# Patient Record
Sex: MALE | Race: WHITE
[De-identification: names, ages, dates, MRNs, and addresses within clinical notes are randomized per-mention and may not be internally consistent; named-entity substitution may affect disease eponyms.]

---

## 2017-06-19 ENCOUNTER — HOSPITAL ENCOUNTER (OUTPATIENT)
Dept: HOSPITAL 82 - ED | Age: 57
Setting detail: OBSERVATION
Discharge: HOME | End: 2017-06-19
Attending: INTERNAL MEDICINE | Admitting: INTERNAL MEDICINE
Payer: MEDICARE

## 2017-06-19 VITALS — HEIGHT: 71 IN | BODY MASS INDEX: 30.86 KG/M2 | WEIGHT: 220.44 LBS

## 2017-06-19 VITALS — DIASTOLIC BLOOD PRESSURE: 79 MMHG | SYSTOLIC BLOOD PRESSURE: 141 MMHG

## 2017-06-19 VITALS — DIASTOLIC BLOOD PRESSURE: 92 MMHG | SYSTOLIC BLOOD PRESSURE: 145 MMHG

## 2017-06-19 DIAGNOSIS — E78.5: ICD-10-CM

## 2017-06-19 DIAGNOSIS — I11.0: ICD-10-CM

## 2017-06-19 DIAGNOSIS — E11.9: ICD-10-CM

## 2017-06-19 DIAGNOSIS — Z79.4: ICD-10-CM

## 2017-06-19 DIAGNOSIS — Z95.5: ICD-10-CM

## 2017-06-19 DIAGNOSIS — Z79.84: ICD-10-CM

## 2017-06-19 DIAGNOSIS — I25.118: Primary | ICD-10-CM

## 2017-06-19 DIAGNOSIS — R06.02: ICD-10-CM

## 2017-06-19 DIAGNOSIS — Z87.891: ICD-10-CM

## 2017-06-19 DIAGNOSIS — I50.22: ICD-10-CM

## 2017-06-19 DIAGNOSIS — K21.9: ICD-10-CM

## 2017-06-19 DIAGNOSIS — D63.8: ICD-10-CM

## 2017-06-19 LAB
ALBUMIN SERPL-MCNC: 4.3 G/DL (ref 3.2–5)
ALP SERPL-CCNC: 89 U/L (ref 38–126)
ALT SERPL-CCNC: 31 U/L (ref 21–72)
AMYLASE SERPL-CCNC: 49 U/L (ref 30–110)
ANION GAP SERPL CALCULATED.3IONS-SCNC: 17 MMOL/L
APTT PPP: 24.7 SECONDS (ref 20–32.5)
AST SERPL-CCNC: 25 U/L (ref 17–59)
BASOPHILS NFR BLD AUTO: 1 % (ref 0–3)
BILIRUB UR QL STRIP.AUTO: NEGATIVE
BUN SERPL-MCNC: 19 MG/DL (ref 9–20)
BUN/CREAT SERPL: 20
CALCIUM SERPL-MCNC: 9.9 MG/DL (ref 8.4–10.2)
CHLORIDE SERPL-SCNC: 104 MMOL/L (ref 95–108)
CHOLEST SERPL-MCNC: 112 MG/DL (ref 0–199)
CHOLEST/HDLC SERPL: 1.9 {RATIO}
CLARITY UR: CLEAR
CO2 SERPL-SCNC: 25 MMOL/L (ref 22–30)
COLOR UR AUTO: YELLOW
CREAT SERPL-MCNC: 1 MG/DL (ref 0.7–1.3)
EOSINOPHIL NFR BLD AUTO: 1 % (ref 0–8)
ERYTHROCYTE [DISTWIDTH] IN BLOOD BY AUTOMATED COUNT: 17.7 % (ref 11.5–15.5)
GLUCOSE SERPL-MCNC: 142 MG/DL (ref 75–110)
GLUCOSE UR STRIP.AUTO-MCNC: NEGATIVE MG/DL
HCT VFR BLD AUTO: 36.2 % (ref 39–50)
HDLC SERPL-MCNC: 59 MG/DL (ref 40–?)
HGB BLD-MCNC: 10.4 G/DL (ref 14–18)
HGB UR QL STRIP.AUTO: (no result)
IMM GRANULOCYTES NFR BLD: 0.5 % (ref 0–1)
INR PPP: 1 RATIO (ref 0.7–1.3)
KETONES UR STRIP.AUTO-MCNC: NEGATIVE MG/DL
LDLC SERPL CALC-MCNC: 38 MG/DL
LEUKOCYTE ESTERASE UR QL STRIP.AUTO: NEGATIVE
LIPASE SERPL-CCNC: 56 U/L (ref 23–300)
LYMPHOCYTES NFR BLD: 23 % (ref 15–41)
MCH RBC QN AUTO: 20.6 PG  CALC (ref 26–32)
MCHC RBC AUTO-ENTMCNC: 28.7 G/L CALC (ref 32–36)
MCV RBC AUTO: 71.8 FL  CALC (ref 80–100)
MONOCYTES NFR BLD AUTO: 7 % (ref 2–13)
MYOGLOBIN SERPL-MCNC: 49 NG/ML (ref 0–121)
NEUTROPHILS # BLD AUTO: 4.38 THOU/UL (ref 1.82–7.42)
NEUTROPHILS NFR BLD AUTO: 68 % (ref 42–76)
NITRITE UR QL STRIP.AUTO: NEGATIVE
PH UR STRIP.AUTO: 6 [PH] (ref 4.5–8)
PLATELET # BLD AUTO: 230 THOU/UL (ref 130–400)
POTASSIUM SERPL-SCNC: 4.6 MMOL/L (ref 3.5–5.1)
PROT SERPL-MCNC: 7 G/DL (ref 6.3–8.2)
PROT UR QL STRIP.AUTO: (no result) MG/DL
PROTHROMBIN TIME: 10.7 SECONDS (ref 9–12.5)
RBC # BLD AUTO: 5.04 MILL/UL (ref 4.7–6.1)
SODIUM SERPL-SCNC: 142 MMOL/L (ref 137–146)
SP GR UR STRIP.AUTO: 1.01
TRIGL SERPL-MCNC: 76 MG/DL (ref 30–149)
UROBILINOGEN UR QL STRIP.AUTO: 0.2 E.U./DL
VLDLC SERPL CALC-MCNC: 15 MG/DL

## 2017-06-19 PROCEDURE — G0378 HOSPITAL OBSERVATION PER HR: HCPCS

## 2017-06-23 ENCOUNTER — HOSPITAL ENCOUNTER (EMERGENCY)
Dept: HOSPITAL 82 - ED | Age: 57
LOS: 1 days | Discharge: HOME | End: 2017-06-24
Payer: MEDICARE

## 2017-06-23 VITALS — WEIGHT: 204.37 LBS | HEIGHT: 71 IN | BODY MASS INDEX: 28.61 KG/M2

## 2017-06-23 DIAGNOSIS — Z95.5: ICD-10-CM

## 2017-06-23 DIAGNOSIS — R07.89: Primary | ICD-10-CM

## 2017-06-23 DIAGNOSIS — I25.119: ICD-10-CM

## 2017-06-23 DIAGNOSIS — I10: ICD-10-CM

## 2017-06-23 DIAGNOSIS — R94.31: ICD-10-CM

## 2017-06-24 VITALS — SYSTOLIC BLOOD PRESSURE: 140 MMHG | DIASTOLIC BLOOD PRESSURE: 77 MMHG

## 2017-06-24 LAB
ALBUMIN SERPL-MCNC: 3.7 G/DL (ref 3.2–5)
ALP SERPL-CCNC: 94 U/L (ref 38–126)
ALT SERPL-CCNC: 30 U/L (ref 21–72)
AMYLASE SERPL-CCNC: 63 U/L (ref 30–110)
ANION GAP SERPL CALCULATED.3IONS-SCNC: 13 MMOL/L
AST SERPL-CCNC: 19 U/L (ref 17–59)
BASOPHILS NFR BLD AUTO: 0 % (ref 0–3)
BUN SERPL-MCNC: 17 MG/DL (ref 9–20)
BUN/CREAT SERPL: 20
CALCIUM SERPL-MCNC: 8.7 MG/DL (ref 8.4–10.2)
CHLORIDE SERPL-SCNC: 104 MMOL/L (ref 95–108)
CO2 SERPL-SCNC: 26 MMOL/L (ref 22–30)
CREAT SERPL-MCNC: 0.9 MG/DL (ref 0.7–1.3)
EOSINOPHIL NFR BLD AUTO: 1 % (ref 0–8)
ERYTHROCYTE [DISTWIDTH] IN BLOOD BY AUTOMATED COUNT: 17.8 % (ref 11.5–15.5)
GLUCOSE SERPL-MCNC: 318 MG/DL (ref 75–110)
HCT VFR BLD AUTO: 33.2 % (ref 39–50)
HGB BLD-MCNC: 9.7 G/DL (ref 14–18)
IMM GRANULOCYTES NFR BLD: 0.7 % (ref 0–1)
LIPASE SERPL-CCNC: 73 U/L (ref 23–300)
LYMPHOCYTES NFR BLD: 27 % (ref 15–41)
MCH RBC QN AUTO: 20.8 PG  CALC (ref 26–32)
MCHC RBC AUTO-ENTMCNC: 29.2 G/L CALC (ref 32–36)
MCV RBC AUTO: 71.1 FL  CALC (ref 80–100)
MONOCYTES NFR BLD AUTO: 12 % (ref 2–13)
MYOGLOBIN SERPL-MCNC: 40 NG/ML (ref 0–121)
NEUTROPHILS # BLD AUTO: 3.49 THOU/UL (ref 1.82–7.42)
NEUTROPHILS NFR BLD AUTO: 60 % (ref 42–76)
PLATELET # BLD AUTO: 221 THOU/UL (ref 130–400)
POTASSIUM SERPL-SCNC: 4 MMOL/L (ref 3.5–5.1)
PROT SERPL-MCNC: 6.3 G/DL (ref 6.3–8.2)
RBC # BLD AUTO: 4.67 MILL/UL (ref 4.7–6.1)
SODIUM SERPL-SCNC: 139 MMOL/L (ref 137–146)

## 2017-07-18 ENCOUNTER — HOSPITAL ENCOUNTER (EMERGENCY)
Dept: HOSPITAL 82 - ED | Age: 57
LOS: 1 days | Discharge: HOME | End: 2017-07-19
Payer: MEDICARE

## 2017-07-18 VITALS — BODY MASS INDEX: 28.33 KG/M2 | HEIGHT: 71 IN | WEIGHT: 202.38 LBS

## 2017-07-18 DIAGNOSIS — Z79.4: ICD-10-CM

## 2017-07-18 DIAGNOSIS — Z92.3: ICD-10-CM

## 2017-07-18 DIAGNOSIS — Z85.819: ICD-10-CM

## 2017-07-18 DIAGNOSIS — E78.5: ICD-10-CM

## 2017-07-18 DIAGNOSIS — R07.9: Primary | ICD-10-CM

## 2017-07-18 DIAGNOSIS — Z92.21: ICD-10-CM

## 2017-07-18 DIAGNOSIS — Z95.5: ICD-10-CM

## 2017-07-18 DIAGNOSIS — K21.9: ICD-10-CM

## 2017-07-18 DIAGNOSIS — Z79.84: ICD-10-CM

## 2017-07-18 DIAGNOSIS — E11.9: ICD-10-CM

## 2017-07-18 DIAGNOSIS — I10: ICD-10-CM

## 2017-07-18 LAB
ALBUMIN SERPL-MCNC: 4 G/DL (ref 3.2–5)
ALP SERPL-CCNC: 92 U/L (ref 38–126)
ALT SERPL-CCNC: 43 U/L (ref 21–72)
ANION GAP SERPL CALCULATED.3IONS-SCNC: 15 MMOL/L
AST SERPL-CCNC: 23 U/L (ref 17–59)
BASOPHILS NFR BLD AUTO: 1 % (ref 0–3)
BILIRUB UR QL STRIP.AUTO: NEGATIVE
BUN SERPL-MCNC: 20 MG/DL (ref 9–20)
BUN/CREAT SERPL: 26
CALCIUM SERPL-MCNC: 9.8 MG/DL (ref 8.4–10.2)
CHLORIDE SERPL-SCNC: 105 MMOL/L (ref 95–108)
CLARITY UR: CLEAR
CO2 SERPL-SCNC: 22 MMOL/L (ref 22–30)
COLOR UR AUTO: YELLOW
CREAT SERPL-MCNC: 0.8 MG/DL (ref 0.7–1.3)
EOSINOPHIL NFR BLD AUTO: 1 % (ref 0–8)
ERYTHROCYTE [DISTWIDTH] IN BLOOD BY AUTOMATED COUNT: 18.6 % (ref 11.5–15.5)
GLUCOSE SERPL-MCNC: 240 MG/DL (ref 75–110)
GLUCOSE UR STRIP.AUTO-MCNC: >=1000 MG/DL
HCT VFR BLD AUTO: 35.2 % (ref 39–50)
HGB BLD-MCNC: 10.4 G/DL (ref 14–18)
HGB UR QL STRIP.AUTO: (no result)
IMM GRANULOCYTES NFR BLD: 0.4 % (ref 0–1)
KETONES UR STRIP.AUTO-MCNC: NEGATIVE MG/DL
LEUKOCYTE ESTERASE UR QL STRIP.AUTO: NEGATIVE
LYMPHOCYTES NFR BLD: 19 % (ref 15–41)
MCH RBC QN AUTO: 20.8 PG  CALC (ref 26–32)
MCHC RBC AUTO-ENTMCNC: 29.5 G/L CALC (ref 32–36)
MCV RBC AUTO: 70.5 FL  CALC (ref 80–100)
MONOCYTES NFR BLD AUTO: 9 % (ref 2–13)
MYOGLOBIN SERPL-MCNC: 42 NG/ML (ref 0–121)
NEUTROPHILS # BLD AUTO: 4.84 THOU/UL (ref 1.82–7.42)
NEUTROPHILS NFR BLD AUTO: 71 % (ref 42–76)
NITRITE UR QL STRIP.AUTO: NEGATIVE
PH UR STRIP.AUTO: 6 [PH] (ref 4.5–8)
PLATELET # BLD AUTO: 224 THOU/UL (ref 130–400)
POTASSIUM SERPL-SCNC: 4.2 MMOL/L (ref 3.5–5.1)
PROT SERPL-MCNC: 6.5 G/DL (ref 6.3–8.2)
PROT UR QL STRIP.AUTO: 100 MG/DL
RBC # BLD AUTO: 4.99 MILL/UL (ref 4.7–6.1)
RBC #/AREA URNS HPF: (no result) RBC/HPF (ref 0–5)
SODIUM SERPL-SCNC: 139 MMOL/L (ref 137–146)
SP GR UR STRIP.AUTO: >=1.03
UROBILINOGEN UR QL STRIP.AUTO: 0.2 E.U./DL
WBC #/AREA URNS HPF: (no result) WBC/HPF (ref 0–5)

## 2017-07-19 VITALS — DIASTOLIC BLOOD PRESSURE: 80 MMHG | SYSTOLIC BLOOD PRESSURE: 138 MMHG

## 2017-11-25 ENCOUNTER — HOSPITAL ENCOUNTER (EMERGENCY)
Dept: HOSPITAL 82 - ED | Age: 57
Discharge: HOME | End: 2017-11-25
Payer: MEDICARE

## 2017-11-25 VITALS — WEIGHT: 205.03 LBS | HEIGHT: 71 IN | BODY MASS INDEX: 28.7 KG/M2

## 2017-11-25 VITALS — DIASTOLIC BLOOD PRESSURE: 96 MMHG | SYSTOLIC BLOOD PRESSURE: 162 MMHG

## 2017-11-25 DIAGNOSIS — K29.70: ICD-10-CM

## 2017-11-25 DIAGNOSIS — R10.31: ICD-10-CM

## 2017-11-25 DIAGNOSIS — R11.2: ICD-10-CM

## 2017-11-25 DIAGNOSIS — Z98.890: ICD-10-CM

## 2017-11-25 DIAGNOSIS — R10.32: Primary | ICD-10-CM

## 2017-11-25 LAB
ALBUMIN SERPL-MCNC: 4.5 G/DL (ref 3.2–5)
ALP SERPL-CCNC: 104 U/L (ref 38–126)
ALT SERPL-CCNC: 24 U/L (ref 21–72)
AMYLASE SERPL-CCNC: 61 U/L (ref 30–110)
ANION GAP SERPL CALCULATED.3IONS-SCNC: 17 MMOL/L
AST SERPL-CCNC: 16 U/L (ref 17–59)
BASOPHILS NFR BLD AUTO: 1 % (ref 0–3)
BILIRUB UR QL STRIP.AUTO: NEGATIVE
BUN SERPL-MCNC: 22 MG/DL (ref 9–20)
BUN/CREAT SERPL: 21
CALCIUM SERPL-MCNC: 10.1 MG/DL (ref 8.4–10.2)
CHLORIDE SERPL-SCNC: 105 MMOL/L (ref 95–108)
CLARITY UR: CLEAR
CO2 SERPL-SCNC: 25 MMOL/L (ref 22–30)
COLOR UR AUTO: YELLOW
CREAT SERPL-MCNC: 1.1 MG/DL (ref 0.7–1.3)
EOSINOPHIL NFR BLD AUTO: 1 % (ref 0–8)
ERYTHROCYTE [DISTWIDTH] IN BLOOD BY AUTOMATED COUNT: 20 % (ref 11.5–15.5)
GLUCOSE SERPL-MCNC: 95 MG/DL (ref 75–110)
GLUCOSE UR STRIP.AUTO-MCNC: NEGATIVE MG/DL
HCT VFR BLD AUTO: 41.6 % (ref 39–50)
HGB BLD-MCNC: 12.4 G/DL (ref 14–18)
HGB UR QL STRIP.AUTO: (no result)
IMM GRANULOCYTES NFR BLD: 0.3 % (ref 0–1)
KETONES UR STRIP.AUTO-MCNC: (no result) MG/DL
LEUKOCYTE ESTERASE UR QL STRIP.AUTO: NEGATIVE
LIPASE SERPL-CCNC: 39 U/L (ref 23–300)
LYMPHOCYTES NFR BLD: 12 % (ref 15–41)
MCH RBC QN AUTO: 21.3 PG  CALC (ref 26–32)
MCHC RBC AUTO-ENTMCNC: 29.8 G/L CALC (ref 32–36)
MCV RBC AUTO: 71.5 FL  CALC (ref 80–100)
MONOCYTES NFR BLD AUTO: 9 % (ref 2–13)
NEUTROPHILS # BLD AUTO: 6.23 THOU/UL (ref 1.82–7.42)
NEUTROPHILS NFR BLD AUTO: 78 % (ref 42–76)
NITRITE UR QL STRIP.AUTO: NEGATIVE
PH UR STRIP.AUTO: 6.5 [PH] (ref 4.5–8)
PLATELET # BLD AUTO: 198 THOU/UL (ref 130–400)
POTASSIUM SERPL-SCNC: 4.1 MMOL/L (ref 3.5–5.1)
PROT SERPL-MCNC: 7.3 G/DL (ref 6.3–8.2)
PROT UR QL STRIP.AUTO: 100 MG/DL
RBC # BLD AUTO: 5.82 MILL/UL (ref 4.7–6.1)
RBC #/AREA URNS HPF: (no result) RBC/HPF (ref 0–5)
SODIUM SERPL-SCNC: 143 MMOL/L (ref 137–146)
SP GR UR STRIP.AUTO: 1.02
SQUAMOUS URNS QL MICRO: (no result) EPI/HPF
UROBILINOGEN UR QL STRIP.AUTO: 1 E.U./DL

## 2018-01-10 ENCOUNTER — HOSPITAL ENCOUNTER (EMERGENCY)
Dept: HOSPITAL 82 - ED | Age: 58
LOS: 1 days | Discharge: HOME | End: 2018-01-11
Payer: MEDICARE

## 2018-01-10 VITALS — HEIGHT: 71 IN | WEIGHT: 205.03 LBS | BODY MASS INDEX: 28.7 KG/M2

## 2018-01-10 DIAGNOSIS — I25.810: ICD-10-CM

## 2018-01-10 DIAGNOSIS — R07.89: Primary | ICD-10-CM

## 2018-01-10 DIAGNOSIS — R11.2: ICD-10-CM

## 2018-01-10 DIAGNOSIS — R06.02: ICD-10-CM

## 2018-01-10 DIAGNOSIS — Z95.1: ICD-10-CM

## 2018-01-10 LAB
ALBUMIN SERPL-MCNC: 4.1 G/DL (ref 3.2–5)
ALP SERPL-CCNC: 107 U/L (ref 38–126)
ALT SERPL-CCNC: 25 U/L (ref 21–72)
AMYLASE SERPL-CCNC: 59 U/L (ref 30–110)
ANION GAP SERPL CALCULATED.3IONS-SCNC: 16 MMOL/L
AST SERPL-CCNC: 28 U/L (ref 17–59)
BASOPHILS NFR BLD AUTO: 1 % (ref 0–3)
BILIRUB UR QL STRIP.AUTO: NEGATIVE
BUN SERPL-MCNC: 16 MG/DL (ref 9–20)
BUN/CREAT SERPL: 19
CALCIUM SERPL-MCNC: 9.4 MG/DL (ref 8.4–10.2)
CHLORIDE SERPL-SCNC: 102 MMOL/L (ref 95–108)
CLARITY UR: CLEAR
CO2 SERPL-SCNC: 21 MMOL/L (ref 22–30)
COLOR UR AUTO: YELLOW
CREAT SERPL-MCNC: 0.8 MG/DL (ref 0.7–1.3)
EOSINOPHIL NFR BLD AUTO: 0 % (ref 0–8)
ERYTHROCYTE [DISTWIDTH] IN BLOOD BY AUTOMATED COUNT: 18.5 % (ref 11.5–15.5)
GLUCOSE SERPL-MCNC: 175 MG/DL (ref 75–110)
GLUCOSE UR STRIP.AUTO-MCNC: 250 MG/DL
HCT VFR BLD AUTO: 33.9 % (ref 39–50)
HGB BLD-MCNC: 10.1 G/DL (ref 14–18)
HGB UR QL STRIP.AUTO: (no result)
IMM GRANULOCYTES NFR BLD: 0.5 % (ref 0–1)
KETONES UR STRIP.AUTO-MCNC: NEGATIVE MG/DL
LEUKOCYTE ESTERASE UR QL STRIP.AUTO: NEGATIVE
LIPASE SERPL-CCNC: 23 U/L (ref 23–300)
LYMPHOCYTES NFR BLD: 17 % (ref 15–41)
MCH RBC QN AUTO: 22.5 PG  CALC (ref 26–32)
MCHC RBC AUTO-ENTMCNC: 29.8 G/L CALC (ref 32–36)
MCV RBC AUTO: 75.5 FL  CALC (ref 80–100)
MONOCYTES NFR BLD AUTO: 11 % (ref 2–13)
MYOGLOBIN SERPL-MCNC: 74 NG/ML (ref 0–121)
NEUTROPHILS # BLD AUTO: 4.25 THOU/UL (ref 1.82–7.42)
NEUTROPHILS NFR BLD AUTO: 72 % (ref 42–76)
NITRITE UR QL STRIP.AUTO: NEGATIVE
PH UR STRIP.AUTO: 5.5 [PH] (ref 4.5–8)
PLATELET # BLD AUTO: 202 THOU/UL (ref 130–400)
POTASSIUM SERPL-SCNC: 4.7 MMOL/L (ref 3.5–5.1)
PROT SERPL-MCNC: 6.7 G/DL (ref 6.3–8.2)
PROT UR QL STRIP.AUTO: 100 MG/DL
RBC # BLD AUTO: 4.49 MILL/UL (ref 4.7–6.1)
RBC #/AREA URNS HPF: (no result) RBC/HPF (ref 0–5)
SODIUM SERPL-SCNC: 135 MMOL/L (ref 137–146)
SP GR UR STRIP.AUTO: >=1.03
UROBILINOGEN UR QL STRIP.AUTO: 0.2 E.U./DL
WBC #/AREA URNS HPF: (no result) WBC/HPF (ref 0–5)

## 2018-01-11 VITALS — DIASTOLIC BLOOD PRESSURE: 88 MMHG | SYSTOLIC BLOOD PRESSURE: 148 MMHG

## 2018-01-27 ENCOUNTER — HOSPITAL ENCOUNTER (OUTPATIENT)
Dept: HOSPITAL 82 - ED | Age: 58
Setting detail: OBSERVATION
LOS: 2 days | Discharge: HOME | End: 2018-01-29
Attending: INTERNAL MEDICINE | Admitting: INTERNAL MEDICINE
Payer: MEDICARE

## 2018-01-27 VITALS — DIASTOLIC BLOOD PRESSURE: 59 MMHG | SYSTOLIC BLOOD PRESSURE: 126 MMHG

## 2018-01-27 VITALS — BODY MASS INDEX: 31.79 KG/M2 | WEIGHT: 227.08 LBS | HEIGHT: 71 IN

## 2018-01-27 VITALS — DIASTOLIC BLOOD PRESSURE: 81 MMHG | SYSTOLIC BLOOD PRESSURE: 148 MMHG

## 2018-01-27 DIAGNOSIS — I50.22: ICD-10-CM

## 2018-01-27 DIAGNOSIS — I25.118: Primary | ICD-10-CM

## 2018-01-27 DIAGNOSIS — M54.5: ICD-10-CM

## 2018-01-27 DIAGNOSIS — R51: ICD-10-CM

## 2018-01-27 DIAGNOSIS — Z95.1: ICD-10-CM

## 2018-01-27 DIAGNOSIS — R05: ICD-10-CM

## 2018-01-27 DIAGNOSIS — E11.9: ICD-10-CM

## 2018-01-27 DIAGNOSIS — Z92.21: ICD-10-CM

## 2018-01-27 DIAGNOSIS — I34.0: ICD-10-CM

## 2018-01-27 DIAGNOSIS — K21.9: ICD-10-CM

## 2018-01-27 DIAGNOSIS — R31.9: ICD-10-CM

## 2018-01-27 DIAGNOSIS — Z79.82: ICD-10-CM

## 2018-01-27 DIAGNOSIS — G89.29: ICD-10-CM

## 2018-01-27 DIAGNOSIS — Z92.3: ICD-10-CM

## 2018-01-27 DIAGNOSIS — Z85.819: ICD-10-CM

## 2018-01-27 DIAGNOSIS — E78.5: ICD-10-CM

## 2018-01-27 DIAGNOSIS — Z79.02: ICD-10-CM

## 2018-01-27 DIAGNOSIS — Z86.73: ICD-10-CM

## 2018-01-27 DIAGNOSIS — Z95.5: ICD-10-CM

## 2018-01-27 DIAGNOSIS — D63.8: ICD-10-CM

## 2018-01-27 DIAGNOSIS — Z79.4: ICD-10-CM

## 2018-01-27 DIAGNOSIS — Z87.891: ICD-10-CM

## 2018-01-27 DIAGNOSIS — I11.0: ICD-10-CM

## 2018-01-27 LAB
ALBUMIN SERPL-MCNC: 3.8 G/DL (ref 3.2–5)
ALP SERPL-CCNC: 172 U/L (ref 38–126)
ALT SERPL-CCNC: 51 U/L (ref 21–72)
AMYLASE SERPL-CCNC: 34 U/L (ref 30–110)
ANION GAP SERPL CALCULATED.3IONS-SCNC: 15 MMOL/L
AST SERPL-CCNC: 49 U/L (ref 17–59)
BASOPHILS NFR BLD AUTO: 0 % (ref 0–3)
BILIRUB UR QL STRIP.AUTO: (no result)
BUN SERPL-MCNC: 18 MG/DL (ref 9–20)
BUN/CREAT SERPL: 20
CALCIUM SERPL-MCNC: 9.7 MG/DL (ref 8.4–10.2)
CHLORIDE SERPL-SCNC: 103 MMOL/L (ref 95–108)
CLARITY UR: CLEAR
CO2 SERPL-SCNC: 22 MMOL/L (ref 22–30)
COLOR UR AUTO: YELLOW
CREAT SERPL-MCNC: 0.9 MG/DL (ref 0.7–1.3)
EOSINOPHIL NFR BLD AUTO: 0 % (ref 0–8)
ERYTHROCYTE [DISTWIDTH] IN BLOOD BY AUTOMATED COUNT: 17.7 % (ref 11.5–15.5)
GLUCOSE SERPL-MCNC: 130 MG/DL (ref 75–110)
GLUCOSE UR STRIP.AUTO-MCNC: NEGATIVE MG/DL
HCT VFR BLD AUTO: 33.2 % (ref 39–50)
HGB BLD-MCNC: 10 G/DL (ref 14–18)
HGB UR QL STRIP.AUTO: (no result)
IMM GRANULOCYTES NFR BLD: 0.5 % (ref 0–1)
KETONES UR STRIP.AUTO-MCNC: NEGATIVE MG/DL
LEUKOCYTE ESTERASE UR QL STRIP.AUTO: NEGATIVE
LIPASE SERPL-CCNC: 20 U/L (ref 23–300)
LYMPHOCYTES NFR BLD: 6 % (ref 15–41)
MCH RBC QN AUTO: 22.3 PG  CALC (ref 26–32)
MCHC RBC AUTO-ENTMCNC: 30.1 G/L CALC (ref 32–36)
MCV RBC AUTO: 74.1 FL  CALC (ref 80–100)
MONOCYTES NFR BLD AUTO: 9 % (ref 2–13)
MYOGLOBIN SERPL-MCNC: 50 NG/ML (ref 0–121)
NEUTROPHILS # BLD AUTO: 7.4 THOU/UL (ref 1.82–7.42)
NEUTROPHILS NFR BLD AUTO: 84 % (ref 42–76)
NITRITE UR QL STRIP.AUTO: NEGATIVE
PH UR STRIP.AUTO: 7.5 [PH] (ref 4.5–8)
PLATELET # BLD AUTO: 202 THOU/UL (ref 130–400)
POTASSIUM SERPL-SCNC: 4.8 MMOL/L (ref 3.5–5.1)
PROT SERPL-MCNC: 6.1 G/DL (ref 6.3–8.2)
PROT UR QL STRIP.AUTO: >=300 MG/DL
RBC # BLD AUTO: 4.48 MILL/UL (ref 4.7–6.1)
RBC #/AREA URNS HPF: (no result) RBC/HPF (ref 0–5)
SODIUM SERPL-SCNC: 135 MMOL/L (ref 137–146)
SP GR UR STRIP.AUTO: 1.02
SQUAMOUS URNS QL MICRO: (no result) EPI/HPF
UROBILINOGEN UR QL STRIP.AUTO: 1 E.U./DL

## 2018-01-28 VITALS — DIASTOLIC BLOOD PRESSURE: 57 MMHG | SYSTOLIC BLOOD PRESSURE: 118 MMHG

## 2018-01-28 VITALS — DIASTOLIC BLOOD PRESSURE: 82 MMHG | SYSTOLIC BLOOD PRESSURE: 150 MMHG

## 2018-01-28 VITALS — DIASTOLIC BLOOD PRESSURE: 82 MMHG | SYSTOLIC BLOOD PRESSURE: 147 MMHG

## 2018-01-28 VITALS — DIASTOLIC BLOOD PRESSURE: 76 MMHG | SYSTOLIC BLOOD PRESSURE: 131 MMHG

## 2018-01-28 VITALS — DIASTOLIC BLOOD PRESSURE: 71 MMHG | SYSTOLIC BLOOD PRESSURE: 133 MMHG

## 2018-01-28 VITALS — DIASTOLIC BLOOD PRESSURE: 76 MMHG | SYSTOLIC BLOOD PRESSURE: 129 MMHG

## 2018-01-28 LAB
ANION GAP SERPL CALCULATED.3IONS-SCNC: 13 MMOL/L
BASOPHILS NFR BLD AUTO: 0 % (ref 0–3)
BUN SERPL-MCNC: 18 MG/DL (ref 9–20)
BUN/CREAT SERPL: 19
CALCIUM SERPL-MCNC: 9.3 MG/DL (ref 8.4–10.2)
CHLORIDE SERPL-SCNC: 103 MMOL/L (ref 95–108)
CO2 SERPL-SCNC: 24 MMOL/L (ref 22–30)
CREAT SERPL-MCNC: 0.9 MG/DL (ref 0.7–1.3)
EOSINOPHIL NFR BLD AUTO: 0 % (ref 0–8)
ERYTHROCYTE [DISTWIDTH] IN BLOOD BY AUTOMATED COUNT: 17.7 % (ref 11.5–15.5)
ERYTHROCYTE [DISTWIDTH] IN BLOOD BY AUTOMATED COUNT: 17.8 % (ref 11.5–15.5)
GLUCOSE SERPL-MCNC: 174 MG/DL (ref 75–110)
HCT VFR BLD AUTO: 29.7 % (ref 39–50)
HCT VFR BLD AUTO: 32.2 % (ref 39–50)
HGB BLD-MCNC: 8.8 G/DL (ref 14–18)
HGB BLD-MCNC: 9.5 G/DL (ref 14–18)
IMM GRANULOCYTES NFR BLD: 0.4 % (ref 0–1)
LYMPHOCYTES NFR BLD: 11 % (ref 15–41)
MCH RBC QN AUTO: 21.8 PG  CALC (ref 26–32)
MCH RBC QN AUTO: 22 PG  CALC (ref 26–32)
MCHC RBC AUTO-ENTMCNC: 29.5 G/L CALC (ref 32–36)
MCHC RBC AUTO-ENTMCNC: 29.6 G/L CALC (ref 32–36)
MCV RBC AUTO: 73.9 FL  CALC (ref 80–100)
MCV RBC AUTO: 74.3 FL  CALC (ref 80–100)
MONOCYTES NFR BLD AUTO: 13 % (ref 2–13)
NEUTROPHILS # BLD AUTO: 4.11 THOU/UL (ref 1.82–7.42)
NEUTROPHILS NFR BLD AUTO: 75 % (ref 42–76)
PLATELET # BLD AUTO: 145 THOU/UL (ref 130–400)
PLATELET # BLD AUTO: 157 THOU/UL (ref 130–400)
POTASSIUM SERPL-SCNC: 4.4 MMOL/L (ref 3.5–5.1)
RBC # BLD AUTO: 4 MILL/UL (ref 4.7–6.1)
RBC # BLD AUTO: 4.36 MILL/UL (ref 4.7–6.1)
SODIUM SERPL-SCNC: 135 MMOL/L (ref 137–146)

## 2018-01-29 VITALS — SYSTOLIC BLOOD PRESSURE: 157 MMHG | DIASTOLIC BLOOD PRESSURE: 96 MMHG

## 2018-01-29 VITALS — DIASTOLIC BLOOD PRESSURE: 77 MMHG | SYSTOLIC BLOOD PRESSURE: 146 MMHG

## 2018-01-29 VITALS — DIASTOLIC BLOOD PRESSURE: 77 MMHG | SYSTOLIC BLOOD PRESSURE: 143 MMHG

## 2018-01-29 LAB
ANION GAP SERPL CALCULATED.3IONS-SCNC: 15 MMOL/L
BASOPHILS NFR BLD AUTO: 0 % (ref 0–3)
BUN SERPL-MCNC: 14 MG/DL (ref 9–20)
BUN/CREAT SERPL: 15
CALCIUM SERPL-MCNC: 9.7 MG/DL (ref 8.4–10.2)
CHLORIDE SERPL-SCNC: 99 MMOL/L (ref 95–108)
CHOLEST SERPL-MCNC: 133 MG/DL (ref 0–199)
CHOLEST/HDLC SERPL: 2.1 {RATIO}
CO2 SERPL-SCNC: 25 MMOL/L (ref 22–30)
CREAT SERPL-MCNC: 0.9 MG/DL (ref 0.7–1.3)
EOSINOPHIL NFR BLD AUTO: 0 % (ref 0–8)
ERYTHROCYTE [DISTWIDTH] IN BLOOD BY AUTOMATED COUNT: 17.5 % (ref 11.5–15.5)
FLUBV AG SPEC QL IA: (no result)
GLUCOSE SERPL-MCNC: 100 MG/DL (ref 75–110)
HAV IGM SERPL QL IA: (no result)
HCT VFR BLD AUTO: 31.4 % (ref 39–50)
HDLC SERPL-MCNC: 63 MG/DL (ref 40–?)
HGB BLD-MCNC: 9.4 G/DL (ref 14–18)
IMM GRANULOCYTES NFR BLD: 0.2 % (ref 0–1)
LDLC SERPL CALC-MCNC: 57 MG/DL
LYMPHOCYTES NFR BLD: 11 % (ref 15–41)
MAGNESIUM SERPL-MCNC: 1.6 MG/DL (ref 1.6–2.3)
MCH RBC QN AUTO: 21.9 PG  CALC (ref 26–32)
MCHC RBC AUTO-ENTMCNC: 29.9 G/L CALC (ref 32–36)
MCV RBC AUTO: 73.2 FL  CALC (ref 80–100)
MONOCYTES NFR BLD AUTO: 16 % (ref 2–13)
NEUTROPHILS # BLD AUTO: 3.56 THOU/UL (ref 1.82–7.42)
NEUTROPHILS NFR BLD AUTO: 72 % (ref 42–76)
PLATELET # BLD AUTO: 158 THOU/UL (ref 130–400)
POTASSIUM SERPL-SCNC: 4.3 MMOL/L (ref 3.5–5.1)
RBC # BLD AUTO: 4.29 MILL/UL (ref 4.7–6.1)
SODIUM SERPL-SCNC: 136 MMOL/L (ref 137–146)
TRIGL SERPL-MCNC: 64 MG/DL (ref 30–149)
VLDLC SERPL CALC-MCNC: 13 MG/DL

## 2018-02-02 ENCOUNTER — HOSPITAL ENCOUNTER (EMERGENCY)
Dept: HOSPITAL 82 - ED | Age: 58
Discharge: HOME | End: 2018-02-02
Payer: MEDICARE

## 2018-02-02 VITALS — WEIGHT: 205.47 LBS | BODY MASS INDEX: 28.77 KG/M2 | HEIGHT: 71 IN

## 2018-02-02 VITALS — SYSTOLIC BLOOD PRESSURE: 156 MMHG | DIASTOLIC BLOOD PRESSURE: 90 MMHG

## 2018-02-02 DIAGNOSIS — E11.9: ICD-10-CM

## 2018-02-02 DIAGNOSIS — Z95.1: ICD-10-CM

## 2018-02-02 DIAGNOSIS — Z95.5: ICD-10-CM

## 2018-02-02 DIAGNOSIS — Z92.21: ICD-10-CM

## 2018-02-02 DIAGNOSIS — I10: ICD-10-CM

## 2018-02-02 DIAGNOSIS — Z92.3: ICD-10-CM

## 2018-02-02 DIAGNOSIS — E78.5: ICD-10-CM

## 2018-02-02 DIAGNOSIS — K21.9: ICD-10-CM

## 2018-02-02 DIAGNOSIS — M79.671: Primary | ICD-10-CM

## 2018-02-02 DIAGNOSIS — Z85.819: ICD-10-CM

## 2018-04-03 ENCOUNTER — HOSPITAL ENCOUNTER (EMERGENCY)
Dept: HOSPITAL 82 - ED | Age: 58
Discharge: ANOTHER HOSPITAL | End: 2018-04-03
Payer: MEDICARE

## 2018-04-03 VITALS — HEIGHT: 71 IN | BODY MASS INDEX: 27.47 KG/M2 | WEIGHT: 196.21 LBS

## 2018-04-03 VITALS — SYSTOLIC BLOOD PRESSURE: 195 MMHG | DIASTOLIC BLOOD PRESSURE: 100 MMHG

## 2018-04-03 DIAGNOSIS — R20.0: ICD-10-CM

## 2018-04-03 DIAGNOSIS — Z95.5: ICD-10-CM

## 2018-04-03 DIAGNOSIS — R29.810: ICD-10-CM

## 2018-04-03 DIAGNOSIS — I25.709: ICD-10-CM

## 2018-04-03 DIAGNOSIS — I10: ICD-10-CM

## 2018-04-03 DIAGNOSIS — R29.704: ICD-10-CM

## 2018-04-03 DIAGNOSIS — I63.9: Primary | ICD-10-CM

## 2018-04-03 DIAGNOSIS — Z95.1: ICD-10-CM

## 2018-04-03 LAB
ANION GAP SERPL CALCULATED.3IONS-SCNC: 16 MMOL/L
BASOPHILS NFR BLD AUTO: 1 % (ref 0–3)
BUN SERPL-MCNC: 30 MG/DL (ref 9–20)
BUN/CREAT SERPL: 29
CHLORIDE SERPL-SCNC: 104 MMOL/L (ref 95–108)
CO2 SERPL-SCNC: 23 MMOL/L (ref 22–30)
CREAT SERPL-MCNC: 1 MG/DL (ref 0.7–1.3)
EOSINOPHIL NFR BLD AUTO: 1 % (ref 0–8)
ERYTHROCYTE [DISTWIDTH] IN BLOOD BY AUTOMATED COUNT: 18.3 % (ref 11.5–15.5)
HCT VFR BLD AUTO: 31.8 % (ref 39–50)
HGB BLD-MCNC: 9.4 G/DL (ref 14–18)
IMM GRANULOCYTES NFR BLD: 0.5 % (ref 0–1)
INR PPP: 1 RATIO (ref 0.7–1.3)
LYMPHOCYTES NFR BLD: 18 % (ref 15–41)
MCH RBC QN AUTO: 22.1 PG  CALC (ref 26–32)
MCHC RBC AUTO-ENTMCNC: 29.6 G/L CALC (ref 32–36)
MCV RBC AUTO: 74.6 FL  CALC (ref 80–100)
MONOCYTES NFR BLD AUTO: 8 % (ref 2–13)
NEUTROPHILS # BLD AUTO: 4.13 THOU/UL (ref 1.82–7.42)
NEUTROPHILS NFR BLD AUTO: 72 % (ref 42–76)
PLATELET # BLD AUTO: 183 THOU/UL (ref 130–400)
POTASSIUM SERPL-SCNC: 4.1 MMOL/L (ref 3.5–5.1)
PROTHROMBIN TIME: 11.3 SECONDS (ref 9–12.5)
RBC # BLD AUTO: 4.26 MILL/UL (ref 4.7–6.1)
SODIUM SERPL-SCNC: 139 MMOL/L (ref 137–146)

## 2018-05-15 ENCOUNTER — HOSPITAL ENCOUNTER (EMERGENCY)
Dept: HOSPITAL 82 - ED | Age: 58
Discharge: HOME | End: 2018-05-15
Payer: MEDICARE

## 2018-05-15 VITALS — WEIGHT: 195.33 LBS | HEIGHT: 71 IN | BODY MASS INDEX: 27.35 KG/M2

## 2018-05-15 VITALS — DIASTOLIC BLOOD PRESSURE: 73 MMHG | SYSTOLIC BLOOD PRESSURE: 159 MMHG

## 2018-05-15 DIAGNOSIS — I50.9: ICD-10-CM

## 2018-05-15 DIAGNOSIS — R06.00: Primary | ICD-10-CM

## 2018-05-15 DIAGNOSIS — Z79.84: ICD-10-CM

## 2018-05-15 DIAGNOSIS — I34.0: ICD-10-CM

## 2018-05-15 DIAGNOSIS — E11.9: ICD-10-CM

## 2018-05-15 DIAGNOSIS — I20.9: ICD-10-CM

## 2018-05-15 DIAGNOSIS — I10: ICD-10-CM

## 2018-05-15 DIAGNOSIS — Z79.4: ICD-10-CM

## 2018-05-15 LAB
ALBUMIN SERPL-MCNC: 3.7 G/DL (ref 3.2–5)
ALP SERPL-CCNC: 104 U/L (ref 38–126)
ALT SERPL-CCNC: 28 U/L (ref 21–72)
ANION GAP SERPL CALCULATED.3IONS-SCNC: 16 MMOL/L
AST SERPL-CCNC: 15 U/L (ref 17–59)
BASOPHILS NFR BLD AUTO: 1 % (ref 0–3)
BUN SERPL-MCNC: 23 MG/DL (ref 9–20)
BUN/CREAT SERPL: 22
CHLORIDE SERPL-SCNC: 105 MMOL/L (ref 95–108)
CO2 SERPL-SCNC: 22 MMOL/L (ref 22–30)
CREAT SERPL-MCNC: 1 MG/DL (ref 0.7–1.3)
EOSINOPHIL NFR BLD AUTO: 1 % (ref 0–8)
ERYTHROCYTE [DISTWIDTH] IN BLOOD BY AUTOMATED COUNT: 20.2 % (ref 11.5–15.5)
HCT VFR BLD AUTO: 34.7 % (ref 39–50)
HGB BLD-MCNC: 10.4 G/DL (ref 14–18)
IMM GRANULOCYTES NFR BLD: 0.6 % (ref 0–1)
LYMPHOCYTES NFR BLD: 23 % (ref 15–41)
MCH RBC QN AUTO: 22.7 PG  CALC (ref 26–32)
MCHC RBC AUTO-ENTMCNC: 30 G/L CALC (ref 32–36)
MCV RBC AUTO: 75.6 FL  CALC (ref 80–100)
MONOCYTES NFR BLD AUTO: 9 % (ref 2–13)
MYOGLOBIN SERPL-MCNC: 39 NG/ML (ref 0–121)
NEUTROPHILS # BLD AUTO: 3.29 THOU/UL (ref 1.82–7.42)
NEUTROPHILS NFR BLD AUTO: 66 % (ref 42–76)
PLATELET # BLD AUTO: 201 THOU/UL (ref 130–400)
POTASSIUM SERPL-SCNC: 4.2 MMOL/L (ref 3.5–5.1)
PROT SERPL-MCNC: 6.7 G/DL (ref 6.3–8.2)
RBC # BLD AUTO: 4.59 MILL/UL (ref 4.7–6.1)
SODIUM SERPL-SCNC: 139 MMOL/L (ref 137–146)

## 2018-09-16 ENCOUNTER — HOSPITAL ENCOUNTER (EMERGENCY)
Dept: HOSPITAL 82 - ED | Age: 58
Discharge: HOME | End: 2018-09-16
Payer: MEDICARE

## 2018-09-16 VITALS — SYSTOLIC BLOOD PRESSURE: 168 MMHG | DIASTOLIC BLOOD PRESSURE: 87 MMHG

## 2018-09-16 VITALS — WEIGHT: 212.31 LBS | BODY MASS INDEX: 29.72 KG/M2 | HEIGHT: 71 IN

## 2018-09-16 DIAGNOSIS — E11.9: ICD-10-CM

## 2018-09-16 DIAGNOSIS — Z92.21: ICD-10-CM

## 2018-09-16 DIAGNOSIS — R09.1: ICD-10-CM

## 2018-09-16 DIAGNOSIS — R07.89: Primary | ICD-10-CM

## 2018-09-16 DIAGNOSIS — Z85.819: ICD-10-CM

## 2018-09-16 DIAGNOSIS — Z95.5: ICD-10-CM

## 2018-09-16 DIAGNOSIS — I10: ICD-10-CM

## 2018-09-16 DIAGNOSIS — Z95.1: ICD-10-CM

## 2018-09-16 DIAGNOSIS — Z92.3: ICD-10-CM

## 2018-09-16 DIAGNOSIS — E78.5: ICD-10-CM

## 2018-09-16 DIAGNOSIS — K21.9: ICD-10-CM

## 2018-09-16 LAB
ALBUMIN SERPL-MCNC: 3.8 G/DL (ref 3.2–5)
ALP SERPL-CCNC: 95 U/L (ref 38–126)
ALT SERPL-CCNC: 30 U/L (ref 21–72)
ANION GAP SERPL CALCULATED.3IONS-SCNC: 16 MMOL/L
AST SERPL-CCNC: 26 U/L (ref 17–59)
BACTERIA #/AREA URNS HPF: (no result) HPF
BARBITURATES UR-MCNC: NEGATIVE UG/ML
BASOPHILS NFR BLD AUTO: 1 % (ref 0–3)
BILIRUB UR QL STRIP.AUTO: NEGATIVE
BUN SERPL-MCNC: 27 MG/DL (ref 9–20)
BUN/CREAT SERPL: 23
CHLORIDE SERPL-SCNC: 103 MMOL/L (ref 95–108)
CLARITY UR: (no result)
CO2 SERPL-SCNC: 25 MMOL/L (ref 22–30)
COCAINE UR-MCNC: NEGATIVE NG/ML
COLOR UR AUTO: YELLOW
CREAT SERPL-MCNC: 1.2 MG/DL (ref 0.7–1.3)
EOSINOPHIL NFR BLD AUTO: 1 % (ref 0–8)
ERYTHROCYTE [DISTWIDTH] IN BLOOD BY AUTOMATED COUNT: 15.4 % (ref 11.5–15.5)
FINE GRAN CASTS URNS QL MICRO: (no result) LPF
GLUCOSE UR STRIP.AUTO-MCNC: NEGATIVE MG/DL
HCT VFR BLD AUTO: 37.7 % (ref 39–50)
HGB BLD-MCNC: 11.6 G/DL (ref 14–18)
HGB UR QL STRIP.AUTO: (no result)
HYALINE CASTS URNS QL MICRO: (no result) LPF
IMM GRANULOCYTES NFR BLD: 0.6 % (ref 0–5)
KETONES UR STRIP.AUTO-MCNC: (no result) MG/DL
LEUKOCYTE ESTERASE UR QL STRIP.AUTO: NEGATIVE
LYMPHOCYTES NFR BLD: 18 % (ref 15–41)
MCH RBC QN AUTO: 24.9 PG  CALC (ref 26–32)
MCHC RBC AUTO-ENTMCNC: 30.8 G/L CALC (ref 32–36)
MCV RBC AUTO: 80.9 FL  CALC (ref 80–100)
METHADONE SERPL-MCNC: NEGATIVE NG/ML
MONOCYTES NFR BLD AUTO: 10 % (ref 2–13)
MYOGLOBIN SERPL-MCNC: 47 NG/ML (ref 0–121)
NEUTROPHILS # BLD AUTO: 5.12 THOU/UL (ref 1.82–7.42)
NEUTROPHILS NFR BLD AUTO: 71 % (ref 42–76)
NITRITE UR QL STRIP.AUTO: NEGATIVE
OXCYCODONE: NEGATIVE
PH UR STRIP.AUTO: 5.5 [PH] (ref 4.5–8)
PLATELET # BLD AUTO: 200 THOU/UL (ref 130–400)
POTASSIUM SERPL-SCNC: 3.9 MMOL/L (ref 3.5–5.1)
PROT SERPL-MCNC: 6.6 G/DL (ref 6.3–8.2)
PROT UR QL STRIP.AUTO: >=300 MG/DL
RBC # BLD AUTO: 4.66 MILL/UL (ref 4.7–6.1)
RBC #/AREA URNS HPF: (no result) RBC/HPF (ref 0–5)
SERVICE CMNT 15-IMP: (no result) HPF
SODIUM SERPL-SCNC: 140 MMOL/L (ref 137–146)
SP GR UR STRIP.AUTO: >=1.03
SQUAMOUS URNS QL MICRO: (no result) EPI/HPF
TETRAHYDROCANNABIONOL: NEGATIVE
TRICYLIC ANTIDEPRESSANTS: NEGATIVE
UROBILINOGEN UR QL STRIP.AUTO: 0.2 E.U./DL
WBC #/AREA URNS HPF: (no result) WBC/HPF (ref 0–5)

## 2019-03-12 ENCOUNTER — HOSPITAL ENCOUNTER (EMERGENCY)
Dept: HOSPITAL 82 - ED | Age: 59
Discharge: HOME | End: 2019-03-12
Payer: MEDICARE

## 2019-03-12 VITALS — WEIGHT: 230.38 LBS | HEIGHT: 71 IN | BODY MASS INDEX: 32.25 KG/M2

## 2019-03-12 VITALS — SYSTOLIC BLOOD PRESSURE: 155 MMHG | DIASTOLIC BLOOD PRESSURE: 72 MMHG

## 2019-03-12 DIAGNOSIS — Z95.1: ICD-10-CM

## 2019-03-12 DIAGNOSIS — K59.00: Primary | ICD-10-CM

## 2019-03-12 DIAGNOSIS — I10: ICD-10-CM

## 2019-03-12 DIAGNOSIS — Z95.5: ICD-10-CM

## 2019-03-12 DIAGNOSIS — E11.9: ICD-10-CM

## 2019-03-12 DIAGNOSIS — I48.2: ICD-10-CM

## 2019-03-12 DIAGNOSIS — Z95.2: ICD-10-CM

## 2019-03-12 LAB
ALBUMIN SERPL-MCNC: 4 G/DL (ref 3.2–5)
ALP SERPL-CCNC: 97 U/L (ref 38–126)
ANION GAP SERPL CALCULATED.3IONS-SCNC: 15 MMOL/L
AST SERPL-CCNC: 13 U/L (ref 17–59)
BASOPHILS NFR BLD AUTO: 1 % (ref 0–3)
BUN SERPL-MCNC: 16 MG/DL (ref 9–20)
BUN/CREAT SERPL: 15
CHLORIDE SERPL-SCNC: 99 MMOL/L (ref 95–108)
CO2 SERPL-SCNC: 23 MMOL/L (ref 22–30)
CREAT SERPL-MCNC: 1.1 MG/DL (ref 0.7–1.3)
EOSINOPHIL NFR BLD AUTO: 1 % (ref 0–8)
ERYTHROCYTE [DISTWIDTH] IN BLOOD BY AUTOMATED COUNT: 16.6 % (ref 11.5–15.5)
HCT VFR BLD AUTO: 33.4 % (ref 39–50)
HGB BLD-MCNC: 10.3 G/DL (ref 14–18)
IMM GRANULOCYTES NFR BLD: 0.8 % (ref 0–5)
LIPASE SERPL-CCNC: 54 U/L (ref 23–300)
LYMPHOCYTES NFR BLD: 11 % (ref 15–41)
MCH RBC QN AUTO: 26.4 PG  CALC (ref 26–32)
MCHC RBC AUTO-ENTMCNC: 30.8 G/L CALC (ref 32–36)
MCV RBC AUTO: 85.6 FL  CALC (ref 80–100)
MONOCYTES NFR BLD AUTO: 9 % (ref 2–13)
NEUTROPHILS # BLD AUTO: 6.7 THOU/UL (ref 1.82–7.42)
NEUTROPHILS NFR BLD AUTO: 78 % (ref 42–76)
PLATELET # BLD AUTO: 230 THOU/UL (ref 130–400)
POTASSIUM SERPL-SCNC: 4.5 MMOL/L (ref 3.5–5.1)
PROT SERPL-MCNC: 6.7 G/DL (ref 6.3–8.2)
RBC # BLD AUTO: 3.9 MILL/UL (ref 4.7–6.1)
SODIUM SERPL-SCNC: 133 MMOL/L (ref 137–146)

## 2019-08-14 ENCOUNTER — HOSPITAL ENCOUNTER (EMERGENCY)
Dept: HOSPITAL 82 - ED | Age: 59
Discharge: ANOTHER HOSPITAL | End: 2019-08-14
Payer: MEDICARE

## 2019-08-14 VITALS — SYSTOLIC BLOOD PRESSURE: 140 MMHG | DIASTOLIC BLOOD PRESSURE: 63 MMHG

## 2019-08-14 VITALS — WEIGHT: 223.55 LBS | BODY MASS INDEX: 31.3 KG/M2 | HEIGHT: 71 IN

## 2019-08-14 DIAGNOSIS — D50.0: ICD-10-CM

## 2019-08-14 DIAGNOSIS — R07.9: ICD-10-CM

## 2019-08-14 DIAGNOSIS — I10: ICD-10-CM

## 2019-08-14 DIAGNOSIS — Z79.4: ICD-10-CM

## 2019-08-14 DIAGNOSIS — Z95.5: ICD-10-CM

## 2019-08-14 DIAGNOSIS — K92.2: Primary | ICD-10-CM

## 2019-08-14 DIAGNOSIS — E11.9: ICD-10-CM

## 2019-08-14 DIAGNOSIS — I48.92: ICD-10-CM

## 2019-08-14 LAB
ALBUMIN SERPL-MCNC: 3.7 G/DL (ref 3.2–5)
ALP SERPL-CCNC: 79 U/L (ref 38–126)
ANION GAP SERPL CALCULATED.3IONS-SCNC: 16 MMOL/L
AST SERPL-CCNC: 20 U/L (ref 17–59)
BASOPHILS NFR BLD AUTO: 0 % (ref 0–3)
BUN SERPL-MCNC: 33 MG/DL (ref 9–20)
BUN/CREAT SERPL: 25
CHLORIDE SERPL-SCNC: 108 MMOL/L (ref 95–108)
CO2 SERPL-SCNC: 17 MMOL/L (ref 22–30)
CREAT SERPL-MCNC: 1.3 MG/DL (ref 0.7–1.3)
EOSINOPHIL NFR BLD AUTO: 1 % (ref 0–8)
ERYTHROCYTE [DISTWIDTH] IN BLOOD BY AUTOMATED COUNT: 14.9 % (ref 11.5–15.5)
ERYTHROCYTE [DISTWIDTH] IN BLOOD BY AUTOMATED COUNT: 15.1 % (ref 11.5–15.5)
HCT VFR BLD AUTO: 19.7 % (ref 39–50)
HCT VFR BLD AUTO: 19.7 % (ref 39–50)
HGB BLD-MCNC: 6 G/DL (ref 14–18)
HGB BLD-MCNC: 6 G/DL (ref 14–18)
IMM GRANULOCYTES NFR BLD: 1.6 % (ref 0–5)
INR PPP: 1.3 RATIO (ref 0.7–1.3)
LIPASE SERPL-CCNC: 48 U/L (ref 23–300)
LYMPHOCYTES NFR BLD: 24 % (ref 15–41)
MCH RBC QN AUTO: 25.6 PG  CALC (ref 26–32)
MCH RBC QN AUTO: 25.8 PG  CALC (ref 26–32)
MCHC RBC AUTO-ENTMCNC: 30.5 G/L CALC (ref 32–36)
MCHC RBC AUTO-ENTMCNC: 30.5 G/L CALC (ref 32–36)
MCV RBC AUTO: 84.2 FL  CALC (ref 80–100)
MCV RBC AUTO: 84.5 FL  CALC (ref 80–100)
MONOCYTES NFR BLD AUTO: 13 % (ref 2–13)
NEUTROPHILS # BLD AUTO: 3.12 THOU/UL (ref 1.82–7.42)
NEUTROPHILS NFR BLD AUTO: 61 % (ref 42–76)
PLATELET # BLD AUTO: 186 THOU/UL (ref 130–400)
PLATELET # BLD AUTO: 195 THOU/UL (ref 130–400)
POTASSIUM SERPL-SCNC: 4.6 MMOL/L (ref 3.5–5.1)
PROT SERPL-MCNC: 6.6 G/DL (ref 6.3–8.2)
PROTHROMBIN TIME: 13.8 SECONDS (ref 9–12.5)
RBC # BLD AUTO: 2.33 MILL/UL (ref 4.7–6.1)
RBC # BLD AUTO: 2.34 MILL/UL (ref 4.7–6.1)
SODIUM SERPL-SCNC: 136 MMOL/L (ref 137–146)

## 2019-08-14 PROCEDURE — S0164 INJECTION PANTROPRAZOLE: HCPCS

## 2019-09-10 ENCOUNTER — HOSPITAL ENCOUNTER (INPATIENT)
Dept: HOSPITAL 82 - ED | Age: 59
LOS: 5 days | Discharge: HOME | DRG: 378 | End: 2019-09-15
Attending: INTERNAL MEDICINE | Admitting: INTERNAL MEDICINE
Payer: MEDICARE

## 2019-09-10 VITALS — HEIGHT: 71 IN | BODY MASS INDEX: 30.25 KG/M2 | WEIGHT: 216.05 LBS

## 2019-09-10 VITALS — DIASTOLIC BLOOD PRESSURE: 78 MMHG | SYSTOLIC BLOOD PRESSURE: 154 MMHG

## 2019-09-10 VITALS — DIASTOLIC BLOOD PRESSURE: 75 MMHG | SYSTOLIC BLOOD PRESSURE: 148 MMHG

## 2019-09-10 DIAGNOSIS — I25.118: ICD-10-CM

## 2019-09-10 DIAGNOSIS — E78.5: ICD-10-CM

## 2019-09-10 DIAGNOSIS — N40.0: ICD-10-CM

## 2019-09-10 DIAGNOSIS — D62: ICD-10-CM

## 2019-09-10 DIAGNOSIS — E11.9: ICD-10-CM

## 2019-09-10 DIAGNOSIS — Z95.1: ICD-10-CM

## 2019-09-10 DIAGNOSIS — I50.22: ICD-10-CM

## 2019-09-10 DIAGNOSIS — Z87.891: ICD-10-CM

## 2019-09-10 DIAGNOSIS — Z95.2: ICD-10-CM

## 2019-09-10 DIAGNOSIS — Z79.4: ICD-10-CM

## 2019-09-10 DIAGNOSIS — Z85.819: ICD-10-CM

## 2019-09-10 DIAGNOSIS — I11.0: ICD-10-CM

## 2019-09-10 DIAGNOSIS — K92.1: Primary | ICD-10-CM

## 2019-09-10 DIAGNOSIS — K21.9: ICD-10-CM

## 2019-09-10 DIAGNOSIS — Z92.21: ICD-10-CM

## 2019-09-10 DIAGNOSIS — Z95.5: ICD-10-CM

## 2019-09-10 DIAGNOSIS — Z86.010: ICD-10-CM

## 2019-09-10 DIAGNOSIS — F41.9: ICD-10-CM

## 2019-09-10 DIAGNOSIS — Z86.73: ICD-10-CM

## 2019-09-10 DIAGNOSIS — Z92.3: ICD-10-CM

## 2019-09-10 DIAGNOSIS — I48.92: ICD-10-CM

## 2019-09-10 DIAGNOSIS — Z79.01: ICD-10-CM

## 2019-09-10 LAB
ALBUMIN SERPL-MCNC: 4 G/DL (ref 3.2–5)
ALP SERPL-CCNC: 150 U/L (ref 38–126)
ANION GAP SERPL CALCULATED.3IONS-SCNC: 18 MMOL/L
AST SERPL-CCNC: 15 U/L (ref 17–59)
BASOPHILS NFR BLD AUTO: 1 % (ref 0–3)
BILIRUB UR QL STRIP.AUTO: NEGATIVE
BUN SERPL-MCNC: 20 MG/DL (ref 9–20)
BUN/CREAT SERPL: 21
CHLORIDE SERPL-SCNC: 106 MMOL/L (ref 95–108)
CO2 SERPL-SCNC: 18 MMOL/L (ref 22–30)
COLOR UR AUTO: YELLOW
CREAT SERPL-MCNC: 1 MG/DL (ref 0.7–1.3)
EOSINOPHIL NFR BLD AUTO: 1 % (ref 0–8)
ERYTHROCYTE [DISTWIDTH] IN BLOOD BY AUTOMATED COUNT: 15 % (ref 11.5–15.5)
GLUCOSE UR STRIP.AUTO-MCNC: >=1000 MG/DL
HCT VFR BLD AUTO: 29.3 % (ref 39–50)
HGB BLD-MCNC: 9 G/DL (ref 14–18)
HGB UR QL STRIP.AUTO: NEGATIVE
IMM GRANULOCYTES NFR BLD: 0.6 % (ref 0–5)
INR PPP: 1.4 RATIO (ref 0.7–1.3)
KETONES UR STRIP.AUTO-MCNC: NEGATIVE MG/DL
LEUKOCYTE ESTERASE UR QL STRIP.AUTO: NEGATIVE
LYMPHOCYTES NFR BLD: 22 % (ref 15–41)
MCH RBC QN AUTO: 23.6 PG  CALC (ref 26–32)
MCHC RBC AUTO-ENTMCNC: 30.7 G/L CALC (ref 32–36)
MCV RBC AUTO: 76.9 FL  CALC (ref 80–100)
MONOCYTES NFR BLD AUTO: 8 % (ref 2–13)
NEUTROPHILS # BLD AUTO: 3.39 THOU/UL (ref 1.82–7.42)
NEUTROPHILS NFR BLD AUTO: 68 % (ref 42–76)
NITRITE UR QL STRIP.AUTO: NEGATIVE
PH UR STRIP.AUTO: 5.5 [PH] (ref 4.5–8)
PLATELET # BLD AUTO: 207 THOU/UL (ref 130–400)
POTASSIUM SERPL-SCNC: 5.4 MMOL/L (ref 3.5–5.1)
PROT SERPL-MCNC: 6.8 G/DL (ref 6.3–8.2)
PROT UR QL STRIP.AUTO: 100 MG/DL
PROTHROMBIN TIME: 14.3 SECONDS (ref 9–12.5)
RBC # BLD AUTO: 3.81 MILL/UL (ref 4.7–6.1)
RBC #/AREA URNS HPF: (no result) RBC/HPF (ref 0–5)
SODIUM SERPL-SCNC: 137 MMOL/L (ref 137–146)
SP GR UR STRIP.AUTO: >=1.03
UROBILINOGEN UR QL STRIP.AUTO: 0.2 E.U./DL
WBC #/AREA URNS HPF: (no result) WBC/HPF (ref 0–5)

## 2019-09-10 PROCEDURE — P9016 RBC LEUKOCYTES REDUCED: HCPCS

## 2019-09-10 PROCEDURE — S0164 INJECTION PANTROPRAZOLE: HCPCS

## 2019-09-11 VITALS — SYSTOLIC BLOOD PRESSURE: 164 MMHG | DIASTOLIC BLOOD PRESSURE: 70 MMHG

## 2019-09-11 VITALS — DIASTOLIC BLOOD PRESSURE: 73 MMHG | SYSTOLIC BLOOD PRESSURE: 142 MMHG

## 2019-09-11 VITALS — SYSTOLIC BLOOD PRESSURE: 125 MMHG | DIASTOLIC BLOOD PRESSURE: 55 MMHG

## 2019-09-11 VITALS — DIASTOLIC BLOOD PRESSURE: 65 MMHG | SYSTOLIC BLOOD PRESSURE: 151 MMHG

## 2019-09-11 VITALS — SYSTOLIC BLOOD PRESSURE: 134 MMHG | DIASTOLIC BLOOD PRESSURE: 54 MMHG

## 2019-09-11 VITALS — SYSTOLIC BLOOD PRESSURE: 124 MMHG | DIASTOLIC BLOOD PRESSURE: 62 MMHG

## 2019-09-11 LAB
ANION GAP SERPL CALCULATED.3IONS-SCNC: 13 MMOL/L
BUN SERPL-MCNC: 16 MG/DL (ref 9–20)
BUN/CREAT SERPL: 18
CHLORIDE SERPL-SCNC: 105 MMOL/L (ref 95–108)
CO2 SERPL-SCNC: 24 MMOL/L (ref 22–30)
CREAT SERPL-MCNC: 0.9 MG/DL (ref 0.7–1.3)
ERYTHROCYTE [DISTWIDTH] IN BLOOD BY AUTOMATED COUNT: 14.8 % (ref 11.5–15.5)
HCT VFR BLD AUTO: 26.4 % (ref 39–50)
HCT VFR BLD AUTO: 28.1 % (ref 39–50)
HCT VFR BLD AUTO: 29.5 % (ref 39–50)
HGB BLD-MCNC: 8.2 G/DL (ref 14–18)
HGB BLD-MCNC: 8.7 G/DL (ref 14–18)
HGB BLD-MCNC: 9 G/DL (ref 14–18)
MCH RBC QN AUTO: 23.8 PG  CALC (ref 26–32)
MCHC RBC AUTO-ENTMCNC: 31 G/L CALC (ref 32–36)
MCV RBC AUTO: 77 FL  CALC (ref 80–100)
PLATELET # BLD AUTO: 207 THOU/UL (ref 130–400)
POTASSIUM SERPL-SCNC: 4.6 MMOL/L (ref 3.5–5.1)
RBC # BLD AUTO: 3.65 MILL/UL (ref 4.7–6.1)
SODIUM SERPL-SCNC: 137 MMOL/L (ref 137–146)

## 2019-09-12 VITALS — SYSTOLIC BLOOD PRESSURE: 108 MMHG | DIASTOLIC BLOOD PRESSURE: 54 MMHG

## 2019-09-12 VITALS — DIASTOLIC BLOOD PRESSURE: 98 MMHG | SYSTOLIC BLOOD PRESSURE: 200 MMHG

## 2019-09-12 VITALS — SYSTOLIC BLOOD PRESSURE: 136 MMHG | DIASTOLIC BLOOD PRESSURE: 65 MMHG

## 2019-09-12 VITALS — SYSTOLIC BLOOD PRESSURE: 127 MMHG | DIASTOLIC BLOOD PRESSURE: 61 MMHG

## 2019-09-12 VITALS — SYSTOLIC BLOOD PRESSURE: 132 MMHG | DIASTOLIC BLOOD PRESSURE: 71 MMHG

## 2019-09-12 VITALS — SYSTOLIC BLOOD PRESSURE: 151 MMHG | DIASTOLIC BLOOD PRESSURE: 69 MMHG

## 2019-09-12 VITALS — SYSTOLIC BLOOD PRESSURE: 117 MMHG | DIASTOLIC BLOOD PRESSURE: 58 MMHG

## 2019-09-12 LAB
ALBUMIN SERPL-MCNC: 3.3 G/DL (ref 3.2–5)
ALP SERPL-CCNC: 106 U/L (ref 38–126)
ANION GAP SERPL CALCULATED.3IONS-SCNC: 14 MMOL/L
AST SERPL-CCNC: 16 U/L (ref 17–59)
BUN SERPL-MCNC: 10 MG/DL (ref 9–20)
BUN/CREAT SERPL: 13
CHLORIDE SERPL-SCNC: 108 MMOL/L (ref 95–108)
CO2 SERPL-SCNC: 21 MMOL/L (ref 22–30)
CREAT SERPL-MCNC: 0.8 MG/DL (ref 0.7–1.3)
ERYTHROCYTE [DISTWIDTH] IN BLOOD BY AUTOMATED COUNT: 15 % (ref 11.5–15.5)
HCT VFR BLD AUTO: 28.4 % (ref 39–50)
HGB BLD-MCNC: 8.8 G/DL (ref 14–18)
MCH RBC QN AUTO: 23.8 PG  CALC (ref 26–32)
MCHC RBC AUTO-ENTMCNC: 31 G/L CALC (ref 32–36)
MCV RBC AUTO: 76.8 FL  CALC (ref 80–100)
PLATELET # BLD AUTO: 209 THOU/UL (ref 130–400)
POTASSIUM SERPL-SCNC: 4.4 MMOL/L (ref 3.5–5.1)
PROT SERPL-MCNC: 6.1 G/DL (ref 6.3–8.2)
RBC # BLD AUTO: 3.7 MILL/UL (ref 4.7–6.1)
SODIUM SERPL-SCNC: 138 MMOL/L (ref 137–146)

## 2019-09-13 VITALS — DIASTOLIC BLOOD PRESSURE: 74 MMHG | SYSTOLIC BLOOD PRESSURE: 152 MMHG

## 2019-09-13 VITALS — SYSTOLIC BLOOD PRESSURE: 128 MMHG | DIASTOLIC BLOOD PRESSURE: 71 MMHG

## 2019-09-13 VITALS — SYSTOLIC BLOOD PRESSURE: 134 MMHG | DIASTOLIC BLOOD PRESSURE: 62 MMHG

## 2019-09-13 VITALS — DIASTOLIC BLOOD PRESSURE: 56 MMHG | SYSTOLIC BLOOD PRESSURE: 132 MMHG

## 2019-09-13 VITALS — DIASTOLIC BLOOD PRESSURE: 79 MMHG | SYSTOLIC BLOOD PRESSURE: 127 MMHG

## 2019-09-13 VITALS — SYSTOLIC BLOOD PRESSURE: 141 MMHG | DIASTOLIC BLOOD PRESSURE: 61 MMHG

## 2019-09-13 VITALS — DIASTOLIC BLOOD PRESSURE: 53 MMHG | SYSTOLIC BLOOD PRESSURE: 120 MMHG

## 2019-09-13 VITALS — SYSTOLIC BLOOD PRESSURE: 146 MMHG | DIASTOLIC BLOOD PRESSURE: 71 MMHG

## 2019-09-13 VITALS — DIASTOLIC BLOOD PRESSURE: 50 MMHG | SYSTOLIC BLOOD PRESSURE: 127 MMHG

## 2019-09-13 VITALS — DIASTOLIC BLOOD PRESSURE: 56 MMHG | SYSTOLIC BLOOD PRESSURE: 115 MMHG

## 2019-09-13 VITALS — SYSTOLIC BLOOD PRESSURE: 155 MMHG | DIASTOLIC BLOOD PRESSURE: 77 MMHG

## 2019-09-13 VITALS — SYSTOLIC BLOOD PRESSURE: 139 MMHG | DIASTOLIC BLOOD PRESSURE: 69 MMHG

## 2019-09-13 LAB
ANION GAP SERPL CALCULATED.3IONS-SCNC: 14 MMOL/L
BUN SERPL-MCNC: 7 MG/DL (ref 9–20)
BUN/CREAT SERPL: 9
CHLORIDE SERPL-SCNC: 106 MMOL/L (ref 95–108)
CO2 SERPL-SCNC: 20 MMOL/L (ref 22–30)
CREAT SERPL-MCNC: 0.8 MG/DL (ref 0.7–1.3)
ERYTHROCYTE [DISTWIDTH] IN BLOOD BY AUTOMATED COUNT: 15.2 % (ref 11.5–15.5)
HCT VFR BLD AUTO: 26.5 % (ref 39–50)
HGB BLD-MCNC: 8.1 G/DL (ref 14–18)
MCH RBC QN AUTO: 23.7 PG  CALC (ref 26–32)
MCHC RBC AUTO-ENTMCNC: 30.6 G/L CALC (ref 32–36)
MCV RBC AUTO: 77.5 FL  CALC (ref 80–100)
PLATELET # BLD AUTO: 192 THOU/UL (ref 130–400)
POTASSIUM SERPL-SCNC: 4.3 MMOL/L (ref 3.5–5.1)
RBC # BLD AUTO: 3.42 MILL/UL (ref 4.7–6.1)
SODIUM SERPL-SCNC: 136 MMOL/L (ref 137–146)

## 2019-09-13 PROCEDURE — 30233N1 TRANSFUSION OF NONAUTOLOGOUS RED BLOOD CELLS INTO PERIPHERAL VEIN, PERCUTANEOUS APPROACH: ICD-10-PCS | Performed by: INTERNAL MEDICINE

## 2019-09-14 VITALS — SYSTOLIC BLOOD PRESSURE: 126 MMHG | DIASTOLIC BLOOD PRESSURE: 67 MMHG

## 2019-09-14 VITALS — DIASTOLIC BLOOD PRESSURE: 62 MMHG | SYSTOLIC BLOOD PRESSURE: 129 MMHG

## 2019-09-14 VITALS — DIASTOLIC BLOOD PRESSURE: 59 MMHG | SYSTOLIC BLOOD PRESSURE: 123 MMHG

## 2019-09-14 VITALS — SYSTOLIC BLOOD PRESSURE: 125 MMHG | DIASTOLIC BLOOD PRESSURE: 68 MMHG

## 2019-09-14 VITALS — DIASTOLIC BLOOD PRESSURE: 68 MMHG | SYSTOLIC BLOOD PRESSURE: 135 MMHG

## 2019-09-14 LAB
ANION GAP SERPL CALCULATED.3IONS-SCNC: 13 MMOL/L
BUN SERPL-MCNC: 5 MG/DL (ref 9–20)
BUN/CREAT SERPL: 7
CHLORIDE SERPL-SCNC: 107 MMOL/L (ref 95–108)
CO2 SERPL-SCNC: 22 MMOL/L (ref 22–30)
CREAT SERPL-MCNC: 0.8 MG/DL (ref 0.7–1.3)
ERYTHROCYTE [DISTWIDTH] IN BLOOD BY AUTOMATED COUNT: 15.8 % (ref 11.5–15.5)
HCT VFR BLD AUTO: 30.7 % (ref 39–50)
HGB BLD-MCNC: 9.4 G/DL (ref 14–18)
MCH RBC QN AUTO: 24.1 PG  CALC (ref 26–32)
MCHC RBC AUTO-ENTMCNC: 30.6 G/L CALC (ref 32–36)
MCV RBC AUTO: 78.7 FL  CALC (ref 80–100)
PLATELET # BLD AUTO: 180 THOU/UL (ref 130–400)
POTASSIUM SERPL-SCNC: 4 MMOL/L (ref 3.5–5.1)
RBC # BLD AUTO: 3.9 MILL/UL (ref 4.7–6.1)
SODIUM SERPL-SCNC: 138 MMOL/L (ref 137–146)

## 2019-09-15 VITALS — SYSTOLIC BLOOD PRESSURE: 153 MMHG | DIASTOLIC BLOOD PRESSURE: 78 MMHG

## 2019-09-15 VITALS — DIASTOLIC BLOOD PRESSURE: 67 MMHG | SYSTOLIC BLOOD PRESSURE: 137 MMHG

## 2019-09-15 VITALS — DIASTOLIC BLOOD PRESSURE: 64 MMHG | SYSTOLIC BLOOD PRESSURE: 151 MMHG

## 2019-09-15 VITALS — DIASTOLIC BLOOD PRESSURE: 71 MMHG | SYSTOLIC BLOOD PRESSURE: 153 MMHG

## 2019-09-15 LAB
ALBUMIN SERPL-MCNC: 3.1 G/DL (ref 3.2–5)
ALP SERPL-CCNC: 161 U/L (ref 38–126)
ANION GAP SERPL CALCULATED.3IONS-SCNC: 13 MMOL/L
AST SERPL-CCNC: 21 U/L (ref 17–59)
BASOPHILS NFR BLD AUTO: 1 % (ref 0–3)
BUN SERPL-MCNC: 3 MG/DL (ref 9–20)
BUN/CREAT SERPL: 5
CHLORIDE SERPL-SCNC: 109 MMOL/L (ref 95–108)
CO2 SERPL-SCNC: 21 MMOL/L (ref 22–30)
CREAT SERPL-MCNC: 0.7 MG/DL (ref 0.7–1.3)
EOSINOPHIL NFR BLD AUTO: 3 % (ref 0–8)
ERYTHROCYTE [DISTWIDTH] IN BLOOD BY AUTOMATED COUNT: 16.6 % (ref 11.5–15.5)
HCT VFR BLD AUTO: 30.3 % (ref 39–50)
HGB BLD-MCNC: 9.3 G/DL (ref 14–18)
IMM GRANULOCYTES NFR BLD: 1.2 % (ref 0–5)
LYMPHOCYTES NFR BLD: 30 % (ref 15–41)
MCH RBC QN AUTO: 24.5 PG  CALC (ref 26–32)
MCHC RBC AUTO-ENTMCNC: 30.7 G/L CALC (ref 32–36)
MCV RBC AUTO: 79.7 FL  CALC (ref 80–100)
MONOCYTES NFR BLD AUTO: 13 % (ref 2–13)
NEUTROPHILS # BLD AUTO: 1.82 THOU/UL (ref 1.82–7.42)
NEUTROPHILS NFR BLD AUTO: 52 % (ref 42–76)
PLATELET # BLD AUTO: 174 THOU/UL (ref 130–400)
POTASSIUM SERPL-SCNC: 3.9 MMOL/L (ref 3.5–5.1)
PROT SERPL-MCNC: 5.9 G/DL (ref 6.3–8.2)
RBC # BLD AUTO: 3.8 MILL/UL (ref 4.7–6.1)
SODIUM SERPL-SCNC: 139 MMOL/L (ref 137–146)

## 2019-09-20 ENCOUNTER — HOSPITAL ENCOUNTER (EMERGENCY)
Dept: HOSPITAL 82 - ED | Age: 59
Discharge: HOME | End: 2019-09-20
Payer: MEDICARE

## 2019-09-20 VITALS — WEIGHT: 220.46 LBS | BODY MASS INDEX: 30.86 KG/M2 | HEIGHT: 71 IN

## 2019-09-20 VITALS — DIASTOLIC BLOOD PRESSURE: 84 MMHG | SYSTOLIC BLOOD PRESSURE: 178 MMHG

## 2019-09-20 DIAGNOSIS — K92.0: ICD-10-CM

## 2019-09-20 DIAGNOSIS — E11.9: ICD-10-CM

## 2019-09-20 DIAGNOSIS — Z85.819: ICD-10-CM

## 2019-09-20 DIAGNOSIS — I10: ICD-10-CM

## 2019-09-20 DIAGNOSIS — R10.84: Primary | ICD-10-CM

## 2019-09-20 DIAGNOSIS — Z95.5: ICD-10-CM

## 2019-09-20 DIAGNOSIS — I48.2: ICD-10-CM

## 2019-09-20 DIAGNOSIS — Z95.1: ICD-10-CM

## 2019-09-20 DIAGNOSIS — Z79.4: ICD-10-CM

## 2019-09-20 LAB
ALBUMIN SERPL-MCNC: 4 G/DL (ref 3.2–5)
ALP SERPL-CCNC: 154 U/L (ref 38–126)
ANION GAP SERPL CALCULATED.3IONS-SCNC: 17 MMOL/L
AST SERPL-CCNC: 13 U/L (ref 17–59)
BASOPHILS NFR BLD AUTO: 0 % (ref 0–3)
BUN SERPL-MCNC: 16 MG/DL (ref 9–20)
BUN/CREAT SERPL: 18
CHLORIDE SERPL-SCNC: 105 MMOL/L (ref 95–108)
CO2 SERPL-SCNC: 22 MMOL/L (ref 22–30)
CREAT SERPL-MCNC: 0.9 MG/DL (ref 0.7–1.3)
EOSINOPHIL NFR BLD AUTO: 2 % (ref 0–8)
ERYTHROCYTE [DISTWIDTH] IN BLOOD BY AUTOMATED COUNT: 17.9 % (ref 11.5–15.5)
HCT VFR BLD AUTO: 35.1 % (ref 39–50)
HGB BLD-MCNC: 10.8 G/DL (ref 14–18)
IMM GRANULOCYTES NFR BLD: 0.4 % (ref 0–5)
LIPASE SERPL-CCNC: 45 U/L (ref 23–300)
LYMPHOCYTES NFR BLD: 19 % (ref 15–41)
MCH RBC QN AUTO: 24.4 PG  CALC (ref 26–32)
MCHC RBC AUTO-ENTMCNC: 30.8 G/L CALC (ref 32–36)
MCV RBC AUTO: 79.4 FL  CALC (ref 80–100)
MONOCYTES NFR BLD AUTO: 9 % (ref 2–13)
NEUTROPHILS # BLD AUTO: 3.71 THOU/UL (ref 1.82–7.42)
NEUTROPHILS NFR BLD AUTO: 70 % (ref 42–76)
PLATELET # BLD AUTO: 177 THOU/UL (ref 130–400)
POTASSIUM SERPL-SCNC: 4.1 MMOL/L (ref 3.5–5.1)
PROT SERPL-MCNC: 7.2 G/DL (ref 6.3–8.2)
RBC # BLD AUTO: 4.42 MILL/UL (ref 4.7–6.1)
SODIUM SERPL-SCNC: 140 MMOL/L (ref 137–146)

## 2019-09-20 PROCEDURE — S0164 INJECTION PANTROPRAZOLE: HCPCS

## 2019-10-09 ENCOUNTER — HOSPITAL ENCOUNTER (OUTPATIENT)
Dept: HOSPITAL 82 - ED | Age: 59
Setting detail: OBSERVATION
LOS: 1 days | Discharge: HOME | End: 2019-10-10
Attending: INTERNAL MEDICINE | Admitting: INTERNAL MEDICINE
Payer: MEDICARE

## 2019-10-09 VITALS — DIASTOLIC BLOOD PRESSURE: 71 MMHG | SYSTOLIC BLOOD PRESSURE: 123 MMHG

## 2019-10-09 VITALS — DIASTOLIC BLOOD PRESSURE: 52 MMHG | SYSTOLIC BLOOD PRESSURE: 116 MMHG

## 2019-10-09 VITALS — DIASTOLIC BLOOD PRESSURE: 70 MMHG | SYSTOLIC BLOOD PRESSURE: 138 MMHG

## 2019-10-09 VITALS — SYSTOLIC BLOOD PRESSURE: 152 MMHG | DIASTOLIC BLOOD PRESSURE: 64 MMHG

## 2019-10-09 VITALS — SYSTOLIC BLOOD PRESSURE: 135 MMHG | DIASTOLIC BLOOD PRESSURE: 63 MMHG

## 2019-10-09 VITALS — DIASTOLIC BLOOD PRESSURE: 83 MMHG | SYSTOLIC BLOOD PRESSURE: 165 MMHG

## 2019-10-09 VITALS — SYSTOLIC BLOOD PRESSURE: 146 MMHG | DIASTOLIC BLOOD PRESSURE: 68 MMHG

## 2019-10-09 VITALS — DIASTOLIC BLOOD PRESSURE: 90 MMHG | SYSTOLIC BLOOD PRESSURE: 168 MMHG

## 2019-10-09 VITALS — DIASTOLIC BLOOD PRESSURE: 75 MMHG | SYSTOLIC BLOOD PRESSURE: 144 MMHG

## 2019-10-09 VITALS — DIASTOLIC BLOOD PRESSURE: 65 MMHG | SYSTOLIC BLOOD PRESSURE: 126 MMHG

## 2019-10-09 VITALS — SYSTOLIC BLOOD PRESSURE: 154 MMHG | DIASTOLIC BLOOD PRESSURE: 83 MMHG

## 2019-10-09 VITALS — DIASTOLIC BLOOD PRESSURE: 80 MMHG | SYSTOLIC BLOOD PRESSURE: 154 MMHG

## 2019-10-09 VITALS — SYSTOLIC BLOOD PRESSURE: 156 MMHG | DIASTOLIC BLOOD PRESSURE: 81 MMHG

## 2019-10-09 VITALS — DIASTOLIC BLOOD PRESSURE: 78 MMHG | SYSTOLIC BLOOD PRESSURE: 163 MMHG

## 2019-10-09 VITALS — DIASTOLIC BLOOD PRESSURE: 85 MMHG | SYSTOLIC BLOOD PRESSURE: 149 MMHG

## 2019-10-09 VITALS — SYSTOLIC BLOOD PRESSURE: 141 MMHG | DIASTOLIC BLOOD PRESSURE: 72 MMHG

## 2019-10-09 VITALS — DIASTOLIC BLOOD PRESSURE: 76 MMHG | SYSTOLIC BLOOD PRESSURE: 125 MMHG

## 2019-10-09 VITALS — BODY MASS INDEX: 31.72 KG/M2 | HEIGHT: 71 IN | WEIGHT: 226.56 LBS

## 2019-10-09 VITALS — SYSTOLIC BLOOD PRESSURE: 166 MMHG | DIASTOLIC BLOOD PRESSURE: 87 MMHG

## 2019-10-09 DIAGNOSIS — Z92.3: ICD-10-CM

## 2019-10-09 DIAGNOSIS — E11.65: ICD-10-CM

## 2019-10-09 DIAGNOSIS — E78.5: ICD-10-CM

## 2019-10-09 DIAGNOSIS — Z95.5: ICD-10-CM

## 2019-10-09 DIAGNOSIS — K59.00: ICD-10-CM

## 2019-10-09 DIAGNOSIS — K21.9: ICD-10-CM

## 2019-10-09 DIAGNOSIS — Z87.891: ICD-10-CM

## 2019-10-09 DIAGNOSIS — Z95.1: ICD-10-CM

## 2019-10-09 DIAGNOSIS — Z95.2: ICD-10-CM

## 2019-10-09 DIAGNOSIS — I48.20: ICD-10-CM

## 2019-10-09 DIAGNOSIS — Z95.828: ICD-10-CM

## 2019-10-09 DIAGNOSIS — R10.9: ICD-10-CM

## 2019-10-09 DIAGNOSIS — I25.10: ICD-10-CM

## 2019-10-09 DIAGNOSIS — Z92.21: ICD-10-CM

## 2019-10-09 DIAGNOSIS — Z86.73: ICD-10-CM

## 2019-10-09 DIAGNOSIS — Z85.819: ICD-10-CM

## 2019-10-09 DIAGNOSIS — R07.9: Primary | ICD-10-CM

## 2019-10-09 DIAGNOSIS — I48.92: ICD-10-CM

## 2019-10-09 DIAGNOSIS — I11.0: ICD-10-CM

## 2019-10-09 DIAGNOSIS — Z79.84: ICD-10-CM

## 2019-10-09 DIAGNOSIS — I50.22: ICD-10-CM

## 2019-10-09 LAB
ALBUMIN SERPL-MCNC: 3.4 G/DL (ref 3.2–5)
ALP SERPL-CCNC: 161 U/L (ref 38–126)
ANION GAP SERPL CALCULATED.3IONS-SCNC: 13 MMOL/L
ANION GAP SERPL CALCULATED.3IONS-SCNC: 16 MMOL/L
AST SERPL-CCNC: 14 U/L (ref 17–59)
BACTERIA #/AREA URNS HPF: (no result) HPF
BASOPHILS NFR BLD AUTO: 1 % (ref 0–3)
BILIRUB UR QL STRIP.AUTO: NEGATIVE
BUN SERPL-MCNC: 15 MG/DL (ref 9–20)
BUN SERPL-MCNC: 16 MG/DL (ref 9–20)
BUN/CREAT SERPL: 14
BUN/CREAT SERPL: 16
CHLORIDE SERPL-SCNC: 105 MMOL/L (ref 95–108)
CHLORIDE SERPL-SCNC: 97 MMOL/L (ref 95–108)
CO2 SERPL-SCNC: 22 MMOL/L (ref 22–30)
CO2 SERPL-SCNC: 23 MMOL/L (ref 22–30)
COLOR UR AUTO: YELLOW
CREAT SERPL-MCNC: 1 MG/DL (ref 0.7–1.3)
CREAT SERPL-MCNC: 1.1 MG/DL (ref 0.7–1.3)
EOSINOPHIL NFR BLD AUTO: 1 % (ref 0–8)
EPI CELLS URNS QL MICRO: (no result) EPI/HPF
ERYTHROCYTE [DISTWIDTH] IN BLOOD BY AUTOMATED COUNT: 16.8 % (ref 11.5–15.5)
GLUCOSE UR STRIP.AUTO-MCNC: >=1000 MG/DL
HCT VFR BLD AUTO: 30.1 % (ref 39–50)
HGB BLD-MCNC: 9.6 G/DL (ref 14–18)
HGB UR QL STRIP.AUTO: NEGATIVE
IMM GRANULOCYTES NFR BLD: 1 % (ref 0–5)
KETONES UR STRIP.AUTO-MCNC: NEGATIVE MG/DL
LEUKOCYTE ESTERASE UR QL STRIP.AUTO: NEGATIVE
LIPASE SERPL-CCNC: 71 U/L (ref 23–300)
LYMPHOCYTES NFR BLD: 23 % (ref 15–41)
MAGNESIUM SERPL-MCNC: 1.5 MG/DL (ref 1.6–2.3)
MCH RBC QN AUTO: 24.6 PG  CALC (ref 26–32)
MCHC RBC AUTO-ENTMCNC: 31.9 G/L CALC (ref 32–36)
MCV RBC AUTO: 77 FL  CALC (ref 80–100)
MONOCYTES NFR BLD AUTO: 12 % (ref 2–13)
NEUTROPHILS # BLD AUTO: 2.48 THOU/UL (ref 1.82–7.42)
NEUTROPHILS NFR BLD AUTO: 62 % (ref 42–76)
NITRITE UR QL STRIP.AUTO: NEGATIVE
PH UR STRIP.AUTO: 5.5 [PH] (ref 4.5–8)
PLATELET # BLD AUTO: 166 THOU/UL (ref 130–400)
POTASSIUM SERPL-SCNC: 3.7 MMOL/L (ref 3.5–5.1)
POTASSIUM SERPL-SCNC: 4.1 MMOL/L (ref 3.5–5.1)
PROT SERPL-MCNC: 6.2 G/DL (ref 6.3–8.2)
PROT UR QL STRIP.AUTO: 30 MG/DL
RBC # BLD AUTO: 3.91 MILL/UL (ref 4.7–6.1)
RBC #/AREA URNS HPF: (no result) RBC/HPF (ref 0–5)
SODIUM SERPL-SCNC: 132 MMOL/L (ref 137–146)
SODIUM SERPL-SCNC: 136 MMOL/L (ref 137–146)
SP GR UR STRIP.AUTO: 1.01
UROBILINOGEN UR QL STRIP.AUTO: 0.2 E.U./DL
WBC #/AREA URNS HPF: (no result) WBC/HPF (ref 0–5)

## 2019-10-09 NOTE — NUR
PTS LUNCH TRAY PLACED ON BEDSIDE TABLE. PT STILL STATES HES DOESNT FEEL LIKE
EATING. DR GARDINER NOTIFIED. WAITING ON NEW ORDERS. WILL CONTINUE TO MONITOR.

## 2019-10-09 NOTE — NUR
PT MEDICATED FOR HEADACHE. PT STATES ITS FROM THE NITRO PATCH. PATCH REMOVED &
OINTMENT WIPPED OFF. PT EDUCATED ON NITRO S/E. PT STATES HE'S HAD THE HA SINCE
THE PATCH WAS APPLIED BUT DIDNT SAY ANYTHING.

## 2019-10-09 NOTE — NUR
ARRIVES VIA STRETCHER FROM ER.  AMBULATORY WITH STEADY GAIT ONE NURSE ASSIST
FROM ER STRETCHER TO ICU BED.  BED WEIGHT OBTAINED.  RESPS EVEN AND UNLABORED.
DENIES CP AT THIS TIME.  DOES REPORT SOME HEADACHE AFTER HAVING NITRO PASTE
APPLIED.  BP/HR STABLE.  A-FIB/FLUTTER, PT. WITH HX OF CHONIC A-FIB.  AWAKE,
ALERT, ORIENTED X 3.  TABOR.  RT. DEBBIE, LT. CATARACT.  SKIN WARM AND DRY.
AFEBRILE.  ABD SOFT/DISTENDED.  LAST BM WAS 2 DAYS AGO.  ACCUCHECK ON ARRIVAL
READS HIGH ON THE MACHINE.  ARRIVES ON INSULIN DRIP AT 5 UNITS/HR.  WILL START
IV FLUIDS AS BACK UP BAG AS ORDERED.  PT. DENIES OTHER COMPLAINTS OR NEEDS AT
THIS TIME.  INTRODUCED TO STAFF AND CALL LIGHT SYSTEM.  WILL CONTINUE TO
CLOSELY MONITOR.  CALL LIGHT WITHIN REACH.

## 2019-10-09 NOTE — NUR
CONTINUES TO LAY ON RIGHT SIDE WITH NO DISTRESS NOTED AND EYES CLOSED; RESP.
EVEN AND UNLABORED; URINAL EMPTIED. CALL LIGHT IS IN REACH.

## 2019-10-09 NOTE — NUR
DR GARDINER @BEDSIDE TO ASSESS PTS NEW LEFT SIDED ABD PAIN. PT ABLE TO
COMMUNICATE & REPOSITION SELF W/OUT DIFFICULTY, NO GAURDING OR WINCING WITH
PALPATIONS. VSS. DINNER TRAY @BEDSIDE.

## 2019-10-09 NOTE — NUR
PT. RESTING IN BED WITH EYES CLOSED ON RIGHT SIDE WITH NO DISTRESS NOTED.
REMAINS ON CARDIAC MONITOR AND AFIB/FLUTTER WITH RATE CONTROLLED. WILL
CONTINUE TO MONITOR.

## 2019-10-09 NOTE — NUR
PT REQUEST THIS RN FEEL HIS ABD STATING IT FEELS WORSE ON THE LEFT SIDE. LEFT
SIDE COMPARED TO RIGHT SIDE W/OUT DIFFERENT. ENTIRE ABD SOFT. NO GAURDING/
WINCING UPON PALPATION.

## 2019-10-09 NOTE — NUR
PT LAYING IN BED, TALKING ON PERSONAL CELL PHONE ON SPEAKER. NO S/S OF
DISTRESS. TELE AFIB/AFLUTTER IN 60-80'S. WILL CONTINUE TO MONITOR.

## 2019-10-09 NOTE — NUR
WHEN PLACING PTS DINNER TRAY ON HIS BEDSIDE TABLE, THIS RN ASKED PT IF HE WAS
READY TO EAT. PT RESPONDED, "I'M NOT REALLY HUNGRY"; THEREFORE, TRAY LEFT TO
SIDE OF BED UNTIL PT WAS READY TO EAT. THIS RN WITNESSED PT LEANING OVER LEFT
SIDE OF ABD TO GET TO DINNER TRAY, DISPITE RECENT COMPLAINTS OF LEFT SIDED ABD
PAIN. WHEN ASKED IF PT WANTED THIS RN TO MOVE TRAY CLOSER, PT AGAIN REPSPONDED
WITH "NO, IM NOT REALLY HUNGRY". UPON PICKING UP DINNER TRAYS, TOP REMOVED TO
SEE % OF MEAL CONSUMMED. PT ATE ALL BUT LITERALLY 1 BITE, STATING "I DONT HAVE
MUCH OF AN APPETITE".

## 2019-10-09 NOTE — NUR
ACCUCHECK .  DECREASED TO 4 UNITS ON INSULIN DRIP.  WILL CONTINUE TO
CLOSELY MONITOR.  MEDICATED FOR PAIN AS ORDERED.

## 2019-10-09 NOTE — NUR
RECVD REPORT FROM FELIBERTO HERNANDEZ AT START OF SHIFT. MEDICATIONS PENDING REVIEW FROM
DR GARDINER THIS AM.

## 2019-10-09 NOTE — NUR
pts brother, [facility] patient, called. pt laughing/joking/playful with brother on
unit portable phone. no s/s of distress. will continue to monitor.

## 2019-10-09 NOTE — NUR
PT SITTING UP IN BED, AWAKE/ALERT x4. STATES HE IS NOT HUNGRY RIGHT NOW,
BREAKFAST TRAY LEFT ON BEDSIDE TABLE. PT MEDICATED PER EMAR, EDUCATED PT ABOUT
INDICATION & SCHEDULE. BREATHING EVEN/UNLABORED, LUNGS CTA. ABD SOFT/TENDER
GENERALIZED. SPEECH CLEAR. TABOR. NITRO PASTE ON. CALLBELL W/IN REACH. URINAL ON
SIDE OF BED.

## 2019-10-09 NOTE — NUR
LAB AT BEDSIDE TO DRAW PT.  PT. STATES HIS PAIN IS FROM A 5 TO A 3/10.
NITRO PASTE CHANGED FROM LT. CHEST WALL TO RT. SHOULDER.  INSULIN DRIP
DECREASED TO 3 UNITS/HR.  IV FLUIDS CONTINUE TO INFUSE AS ORDERED.  WILL
CONTINUE TO ASSESS.

## 2019-10-09 NOTE — NUR
pt medicated for nausea & stomach ache. pt then rolled over & went back to
sleep. vss. will continue to monitor.

## 2019-10-09 NOTE — NUR
ASSESSMENT COMPLETED. IV SITE PATENT AND SL, FLUSHES WELL WITH NS. PT.
CONTINUES TO C/O LLQ PER REPORT MD IS ALREAD AWARE AND HAS TYLENOL FOR PAIN,
GIVEN AT THIS TIME AND APPLIED WARM COMPRESS TO PAINFUL AREA; WILL REASSESS.
PT. CONTIUES TO ASK FOR BEVERAGES AS WELL AND GIVEN FRESH WATER AND A GINGER
ALE. VSS. PT. REMAINS A-FLUTTER WITH HR CONTROLLED. MARY ELLEN HOSE IN PLACE TO BLE.
UPDATED ON POC AND VERBALIZES UNDERSTANDING. ENCOURAGED TO CALL FOR ANY NEEDS.
CALL LIGHT IS IN REACH.

## 2019-10-10 VITALS — SYSTOLIC BLOOD PRESSURE: 143 MMHG | DIASTOLIC BLOOD PRESSURE: 66 MMHG

## 2019-10-10 VITALS — SYSTOLIC BLOOD PRESSURE: 165 MMHG | DIASTOLIC BLOOD PRESSURE: 66 MMHG

## 2019-10-10 VITALS — DIASTOLIC BLOOD PRESSURE: 82 MMHG | SYSTOLIC BLOOD PRESSURE: 160 MMHG

## 2019-10-10 VITALS — SYSTOLIC BLOOD PRESSURE: 158 MMHG | DIASTOLIC BLOOD PRESSURE: 77 MMHG

## 2019-10-10 VITALS — SYSTOLIC BLOOD PRESSURE: 158 MMHG | DIASTOLIC BLOOD PRESSURE: 69 MMHG

## 2019-10-10 VITALS — DIASTOLIC BLOOD PRESSURE: 79 MMHG | SYSTOLIC BLOOD PRESSURE: 165 MMHG

## 2019-10-10 VITALS — DIASTOLIC BLOOD PRESSURE: 84 MMHG | SYSTOLIC BLOOD PRESSURE: 121 MMHG

## 2019-10-10 VITALS — SYSTOLIC BLOOD PRESSURE: 155 MMHG | DIASTOLIC BLOOD PRESSURE: 84 MMHG

## 2019-10-10 VITALS — DIASTOLIC BLOOD PRESSURE: 92 MMHG | SYSTOLIC BLOOD PRESSURE: 151 MMHG

## 2019-10-10 VITALS — SYSTOLIC BLOOD PRESSURE: 149 MMHG | DIASTOLIC BLOOD PRESSURE: 70 MMHG

## 2019-10-10 VITALS — SYSTOLIC BLOOD PRESSURE: 149 MMHG | DIASTOLIC BLOOD PRESSURE: 75 MMHG

## 2019-10-10 VITALS — DIASTOLIC BLOOD PRESSURE: 90 MMHG | SYSTOLIC BLOOD PRESSURE: 163 MMHG

## 2019-10-10 VITALS — SYSTOLIC BLOOD PRESSURE: 134 MMHG | DIASTOLIC BLOOD PRESSURE: 57 MMHG

## 2019-10-10 LAB
ANION GAP SERPL CALCULATED.3IONS-SCNC: 12 MMOL/L
BUN SERPL-MCNC: 13 MG/DL (ref 9–20)
BUN/CREAT SERPL: 17
CHLORIDE SERPL-SCNC: 102 MMOL/L (ref 95–108)
CO2 SERPL-SCNC: 26 MMOL/L (ref 22–30)
CREAT SERPL-MCNC: 0.8 MG/DL (ref 0.7–1.3)
ERYTHROCYTE [DISTWIDTH] IN BLOOD BY AUTOMATED COUNT: 16.9 % (ref 11.5–15.5)
HCT VFR BLD AUTO: 35.2 % (ref 39–50)
HGB BLD-MCNC: 11 G/DL (ref 14–18)
MCH RBC QN AUTO: 24.4 PG  CALC (ref 26–32)
MCHC RBC AUTO-ENTMCNC: 31.3 G/L CALC (ref 32–36)
MCV RBC AUTO: 78 FL  CALC (ref 80–100)
PLATELET # BLD AUTO: 175 THOU/UL (ref 130–400)
POTASSIUM SERPL-SCNC: 3.9 MMOL/L (ref 3.5–5.1)
RBC # BLD AUTO: 4.51 MILL/UL (ref 4.7–6.1)
SODIUM SERPL-SCNC: 136 MMOL/L (ref 137–146)

## 2019-10-10 NOTE — NUR
PT REPOSITIONED SELF TO SIT ON SIDE OF BED TO EAT BREAKFAST. STATES HE GOT
"SOME" SLEEP LAST NIGHT. PT CONTINUES TO C/O LEFT SIDED ABD PAIN BUT MOVES
W/OUT DISTRESS.

## 2019-10-10 NOTE — NUR
PT. RESTING IN BED WITH NO DISTRESS NOTED; VOICES NO CONCERNS. TOLERATED ALL
PO THROUGHOUT THE NIGHT; NO NAUSEA OR VOMITING. FRESH WATER PROVIDED. URINAL
EMPTIED. CARDIAC MONITOR REMAINS AFLUTTER. CALL LIGHT IS IN REACH.

## 2019-10-10 NOTE — NUR
PT. RESTING IN BED WITH NO DISTRESS NOTED. VOICES NO CONCERNS. DENIES
NEEDS AT THIS TIME. NEW IV STARTED TO RHAND X1 ATTEMPT AND EMS SITE RMOVED
WITH CATHETER TIP INTACT AND AM LAB OBTAINED AS WELL; TOLERATED WELL. TEMP
ASSESSED AND DAILY WT. OBTAINED. ENCOURAGED TO CALL FOR ANY NEEDS. CALL LIGHT
IS IN REACH; WILL CONTINUE TO MONITOR.

## 2019-10-10 NOTE — NUR
PT. RESTING IN BED WITH EYES CLOSED AND AS SOON AS THIS WRITER ENTERS ROOM;
PT. NOW REPORTS PAIN TO LLQ AND ALSO REPORTS HE HAS NOT SLEPT. THIS WRITER
INFORMED PT. THAT UPON ROUNDS HE HAS IN FACT BEEN SLEEPING. WILL MEDICATE WITH
ORDERED TYLENOL AND WARM PACKS GIVEN TO APPLY TO LLQ. WILL CONTINUE TO
MONITOR.

## 2019-10-10 NOTE — NUR
PT AFEBRILE. % OF BREAKFAST; DENIES N/V. NO BM SINCE 10/7. DENIES CP.
DENIES SOB. TABOR. A&Ox3. ABLE TO REPOSITON SELF.

## 2019-10-10 NOTE — NUR
RADIOLOGY NOTIFIED THAT PT TOOK HIS LAST DOSE OF GASTROGRAFIN. PT SAT HIMSELF
UP ON SIDE OF BED, TO EAT LUNCH. PT CONTINUES TO LAUGH/JOKE WITH STAFF.

## 2019-10-10 NOTE — NUR
RECVD REPORT FROM FELIBERTO MCLAIN @START OF SHIFT. PT SLEEPING IN BED, WOKEN UP FOR
ACCUCHECK . CALLBELL W/IN REACH.

## 2019-10-10 NOTE — NUR
IV DC'D, TIP INTACT, DRESSING APPLIED. PT DRESSED HIMSELF. STATES HIS RIDE
WILL BE HERE TO GET HIM AT 1530 TODAY.

## 2019-10-10 NOTE — NUR
PT EDUCATED ON DC INSTRUCTIONS. PT STATES HE HAS AN APPOINTMENT WITH HIS
CARDIO ALREADY ON 10/15. HE WILL LET ME KNOW WHEN HIS FRIEND ARRIVES SO THEY
"CAN GO OUT ABOUT TOWN". PT PACKED ALL BELONGINGS HIMSELF, STATES HES GOT
EVERYTHING.

## 2019-10-10 NOTE — NUR
pt denies any pain at this time. pt friendly/joking/appears happy. callbell
w/in reach. aflutter on tele. vss. pt drank 2nd contrast drink. will continue
to monitor.

## 2019-10-10 NOTE — NUR
PT RETURNS TO ICU3 FROM CAT SCAN. #22 TO RH WOULD NOT FLUSH & WAS DISPLACED
UNPON INSPECTION. NEW #22 ESTABLISHED IN LEFT AC.
PT HAD ANOTHER XX-LARGE SOFT BROWN BM PRIOR TO CTSCAN.

## 2019-10-10 NOTE — NUR
PT GIVEN 3 CUPS OF GASTROGRAFIN LABELED AS 10:30, 11:00, 11:30. PT EDUCATED ON
CONTRAST PREP & TEST/PROCEDURE.

## 2019-10-27 ENCOUNTER — HOSPITAL ENCOUNTER (EMERGENCY)
Dept: HOSPITAL 82 - ED | Age: 59
Discharge: HOME | End: 2019-10-27
Payer: MEDICARE

## 2019-10-27 VITALS — DIASTOLIC BLOOD PRESSURE: 76 MMHG | SYSTOLIC BLOOD PRESSURE: 161 MMHG

## 2019-10-27 VITALS — HEIGHT: 71 IN | BODY MASS INDEX: 30.71 KG/M2 | WEIGHT: 219.36 LBS

## 2019-10-27 DIAGNOSIS — Z95.2: ICD-10-CM

## 2019-10-27 DIAGNOSIS — Z95.5: ICD-10-CM

## 2019-10-27 DIAGNOSIS — R07.89: Primary | ICD-10-CM

## 2019-10-27 DIAGNOSIS — Z79.4: ICD-10-CM
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
## 2019-10-27 DIAGNOSIS — E11.9: ICD-10-CM

## 2019-10-27 DIAGNOSIS — Z95.1: ICD-10-CM
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
## 2019-10-27 DIAGNOSIS — I48.91: ICD-10-CM

## 2019-10-27 DIAGNOSIS — I10: ICD-10-CM

## 2019-10-27 LAB
ALBUMIN SERPL-MCNC: 3.7 G/DL (ref 3.2–5)
ALP SERPL-CCNC: 152 U/L (ref 38–126)
AMYLASE SERPL-CCNC: 43 U/L (ref 30–110)
ANION GAP SERPL CALCULATED.3IONS-SCNC: 14 MMOL/L
AST SERPL-CCNC: 16 U/L (ref 17–59)
BASOPHILS NFR BLD AUTO: 0 % (ref 0–3)
BUN SERPL-MCNC: 19 MG/DL (ref 9–20)
BUN/CREAT SERPL: 19
CHLORIDE SERPL-SCNC: 103 MMOL/L (ref 95–108)
CO2 SERPL-SCNC: 22 MMOL/L (ref 22–30)
CREAT SERPL-MCNC: 1 MG/DL (ref 0.7–1.3)
EOSINOPHIL NFR BLD AUTO: 1 % (ref 0–8)
ERYTHROCYTE [DISTWIDTH] IN BLOOD BY AUTOMATED COUNT: 15.9 % (ref 11.5–15.5)
HCT VFR BLD AUTO: 34.1 % (ref 39–50)
HGB BLD-MCNC: 10.7 G/DL (ref 14–18)
IMM GRANULOCYTES NFR BLD: 0.6 % (ref 0–5)
LIPASE SERPL-CCNC: 41 U/L (ref 23–300)
LYMPHOCYTES NFR BLD: 18 % (ref 15–41)
MCH RBC QN AUTO: 24.5 PG  CALC (ref 26–32)
MCHC RBC AUTO-ENTMCNC: 31.4 G/L CALC (ref 32–36)
MCV RBC AUTO: 78.2 FL  CALC (ref 80–100)
MONOCYTES NFR BLD AUTO: 9 % (ref 2–13)
MYOGLOBIN SERPL-MCNC: 41 NG/ML (ref 0–121)
NEUTROPHILS # BLD AUTO: 4.63 THOU/UL (ref 1.82–7.42)
NEUTROPHILS NFR BLD AUTO: 71 % (ref 42–76)
PLATELET # BLD AUTO: 181 THOU/UL (ref 130–400)
POTASSIUM SERPL-SCNC: 4.6 MMOL/L (ref 3.5–5.1)
PROT SERPL-MCNC: 6.7 G/DL (ref 6.3–8.2)
RBC # BLD AUTO: 4.36 MILL/UL (ref 4.7–6.1)
SODIUM SERPL-SCNC: 134 MMOL/L (ref 137–146)

## 2019-11-09 ENCOUNTER — HOSPITAL ENCOUNTER (EMERGENCY)
Dept: HOSPITAL 82 - ED | Age: 59
LOS: 1 days | Discharge: HOME | End: 2019-11-10
Payer: MEDICARE

## 2019-11-09 VITALS — HEIGHT: 71 IN | WEIGHT: 219.36 LBS | BODY MASS INDEX: 30.71 KG/M2
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
## 2019-11-09 DIAGNOSIS — S43.401A: Primary | ICD-10-CM

## 2019-11-09 DIAGNOSIS — S70.02XA: ICD-10-CM

## 2019-11-10 VITALS — SYSTOLIC BLOOD PRESSURE: 118 MMHG | DIASTOLIC BLOOD PRESSURE: 82 MMHG

## 2019-11-10 LAB
ALBUMIN SERPL-MCNC: 3.5 G/DL (ref 3.2–5)
ALP SERPL-CCNC: 154 U/L (ref 38–126)
ANION GAP SERPL CALCULATED.3IONS-SCNC: 14 MMOL/L
AST SERPL-CCNC: 16 U/L (ref 17–59)
BASOPHILS NFR BLD AUTO: 1 % (ref 0–3)
BILIRUB UR QL STRIP.AUTO: NEGATIVE
BUN SERPL-MCNC: 16 MG/DL (ref 9–20)
BUN/CREAT SERPL: 17
CHLORIDE SERPL-SCNC: 101 MMOL/L (ref 95–108)
CO2 SERPL-SCNC: 21 MMOL/L (ref 22–30)
COLOR UR AUTO: YELLOW
CREAT SERPL-MCNC: 0.9 MG/DL (ref 0.7–1.3)
EOSINOPHIL NFR BLD AUTO: 1 % (ref 0–8)
ERYTHROCYTE [DISTWIDTH] IN BLOOD BY AUTOMATED COUNT: 15.9 % (ref 11.5–15.5)
GLUCOSE UR STRIP.AUTO-MCNC: >=1000 MG/DL
HCT VFR BLD AUTO: 36.9 % (ref 39–50)
HGB BLD-MCNC: 11.8 G/DL (ref 14–18)
HGB UR QL STRIP.AUTO: (no result)
IMM GRANULOCYTES NFR BLD: 1 % (ref 0–5)
KETONES UR STRIP.AUTO-MCNC: NEGATIVE MG/DL
LEUKOCYTE ESTERASE UR QL STRIP.AUTO: NEGATIVE
LYMPHOCYTES NFR BLD: 30 % (ref 15–41)
MCH RBC QN AUTO: 24.5 PG  CALC (ref 26–32)
MCHC RBC AUTO-ENTMCNC: 32 G/L CALC (ref 32–36)
MCV RBC AUTO: 76.7 FL  CALC (ref 80–100)
MONOCYTES NFR BLD AUTO: 12 % (ref 2–13)
NEUTROPHILS # BLD AUTO: 2.7 THOU/UL (ref 1.82–7.42)
NEUTROPHILS NFR BLD AUTO: 56 % (ref 42–76)
NITRITE UR QL STRIP.AUTO: NEGATIVE
PH UR STRIP.AUTO: 5 [PH] (ref 4.5–8)
PLATELET # BLD AUTO: 167 THOU/UL (ref 130–400)
POTASSIUM SERPL-SCNC: 4.7 MMOL/L (ref 3.5–5.1)
PROT SERPL-MCNC: 6.7 G/DL (ref 6.3–8.2)
PROT UR QL STRIP.AUTO: 100 MG/DL
RBC # BLD AUTO: 4.81 MILL/UL (ref 4.7–6.1)
RBC #/AREA URNS HPF: (no result) RBC/HPF (ref 0–5)
SODIUM SERPL-SCNC: 131 MMOL/L (ref 137–146)
SP GR UR STRIP.AUTO: 1.02
UROBILINOGEN UR QL STRIP.AUTO: 0.2 E.U./DL
WBC #/AREA URNS HPF: (no result) WBC/HPF (ref 0–5)

## 2019-12-09 ENCOUNTER — HOSPITAL ENCOUNTER (EMERGENCY)
Dept: HOSPITAL 82 - ED | Age: 59
Discharge: HOME | End: 2019-12-09
Payer: MEDICARE

## 2019-12-09 VITALS — WEIGHT: 220.46 LBS | HEIGHT: 71 IN | BODY MASS INDEX: 30.86 KG/M2

## 2019-12-09 VITALS — SYSTOLIC BLOOD PRESSURE: 141 MMHG | DIASTOLIC BLOOD PRESSURE: 70 MMHG
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
## 2019-12-09 DIAGNOSIS — S39.012A: Primary | ICD-10-CM

## 2019-12-09 DIAGNOSIS — I48.91: ICD-10-CM

## 2019-12-09 DIAGNOSIS — Z95.1: ICD-10-CM

## 2019-12-09 DIAGNOSIS — Z95.2: ICD-10-CM
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
## 2019-12-09 DIAGNOSIS — S30.0XXA: ICD-10-CM

## 2019-12-09 DIAGNOSIS — E11.9: ICD-10-CM

## 2019-12-09 DIAGNOSIS — I10: ICD-10-CM

## 2019-12-09 DIAGNOSIS — Z95.5: ICD-10-CM

## 2019-12-20 ENCOUNTER — HOSPITAL ENCOUNTER (OUTPATIENT)
Dept: HOSPITAL 82 - ED | Age: 59
Setting detail: OBSERVATION
LOS: 2 days | Discharge: HOME | End: 2019-12-22
Attending: INTERNAL MEDICINE | Admitting: INTERNAL MEDICINE
Payer: MEDICARE

## 2019-12-20 VITALS — DIASTOLIC BLOOD PRESSURE: 67 MMHG | SYSTOLIC BLOOD PRESSURE: 131 MMHG

## 2019-12-20 VITALS — BODY MASS INDEX: 31.78 KG/M2 | WEIGHT: 227 LBS | HEIGHT: 71 IN

## 2019-12-20 DIAGNOSIS — S40.811A: ICD-10-CM

## 2019-12-20 DIAGNOSIS — Z85.819: ICD-10-CM

## 2019-12-20 DIAGNOSIS — E78.5: ICD-10-CM

## 2019-12-20 DIAGNOSIS — Z95.2: ICD-10-CM

## 2019-12-20 DIAGNOSIS — Z92.3: ICD-10-CM

## 2019-12-20 DIAGNOSIS — E11.40: ICD-10-CM

## 2019-12-20 DIAGNOSIS — I25.118: ICD-10-CM
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
## 2019-12-20 DIAGNOSIS — I48.92: ICD-10-CM

## 2019-12-20 DIAGNOSIS — Z95.5: ICD-10-CM

## 2019-12-20 DIAGNOSIS — Z92.21: ICD-10-CM

## 2019-12-20 DIAGNOSIS — S40.812A: ICD-10-CM

## 2019-12-20 DIAGNOSIS — I10: ICD-10-CM

## 2019-12-20 DIAGNOSIS — Z95.1: ICD-10-CM

## 2019-12-20 DIAGNOSIS — Z86.73: ICD-10-CM

## 2019-12-20 DIAGNOSIS — E11.65: ICD-10-CM

## 2019-12-20 DIAGNOSIS — J18.9: Primary | ICD-10-CM

## 2019-12-20 DIAGNOSIS — Z91.81: ICD-10-CM

## 2019-12-20 DIAGNOSIS — R07.9: ICD-10-CM

## 2019-12-20 DIAGNOSIS — Z79.4: ICD-10-CM

## 2019-12-20 LAB
ANION GAP SERPL CALCULATED.3IONS-SCNC: 17 MMOL/L
BASOPHILS NFR BLD AUTO: 1 % (ref 0–3)
BUN SERPL-MCNC: 24 MG/DL (ref 9–20)
BUN/CREAT SERPL: 20
CHLORIDE SERPL-SCNC: 100 MMOL/L (ref 95–108)
CO2 SERPL-SCNC: 21 MMOL/L (ref 22–30)
CREAT SERPL-MCNC: 1.2 MG/DL (ref 0.7–1.3)
EOSINOPHIL NFR BLD AUTO: 1 % (ref 0–8)
ERYTHROCYTE [DISTWIDTH] IN BLOOD BY AUTOMATED COUNT: 15.6 % (ref 11.5–15.5)
HCT VFR BLD AUTO: 36.6 % (ref 39–50)
HGB BLD-MCNC: 11.5 G/DL (ref 14–18)
IMM GRANULOCYTES NFR BLD: 1.3 % (ref 0–5)
LYMPHOCYTES NFR BLD: 23 % (ref 15–41)
MCH RBC QN AUTO: 24.5 PG  CALC (ref 26–32)
MCHC RBC AUTO-ENTMCNC: 31.4 G/L CALC (ref 32–36)
MCV RBC AUTO: 77.9 FL  CALC (ref 80–100)
MONOCYTES NFR BLD AUTO: 12 % (ref 2–13)
NEUTROPHILS # BLD AUTO: 2.47 THOU/UL (ref 1.82–7.42)
NEUTROPHILS NFR BLD AUTO: 62 % (ref 42–76)
PLATELET # BLD AUTO: 156 THOU/UL (ref 130–400)
POTASSIUM SERPL-SCNC: 4.6 MMOL/L (ref 3.5–5.1)
RBC # BLD AUTO: 4.7 MILL/UL (ref 4.7–6.1)
SODIUM SERPL-SCNC: 132 MMOL/L (ref 137–146)

## 2019-12-20 PROCEDURE — G0378 HOSPITAL OBSERVATION PER HR: HCPCS

## 2019-12-20 NOTE — NUR
PT SITTING ON SIDE OF THE BED. POC DISCUSSED AND PT ASSESSED. ADMISSION
COMPLETED AT THIS TIME. LUNG SOUNDS ARE CLEAR, PT DENIES SOB AT THIS TIME. NO
S/O DISTRESS. PT HAS SCABBED AREAS TO ARMS AND REPORTS MULTIPLE FALLS AT HOME
CAUSING THESE SCABBED AREAS. PT INSTRUCTED TO CALL PRIOR TO GETTING
UP/VERBALIZED AGREEMENT TO SAFETY MEASURES. CALL LIGHT IN HAND AND PT
ENCOURAGED TO CALL AS NEEDS ARISE.

## 2019-12-20 NOTE — NUR
PT RESTING ON STRETCHER; PT STATES CP IS NOW 5 OUT OF 10 AFTER NITRO;
MONITORING DEVICES IN PLACE; CALL LIGHT WITHIN REACH; WILL CONTINUE TO MONITOR

## 2019-12-20 NOTE — NUR
PT RESTING ON STRETCHER WITH EYES CLOSED; NO S/S OF DISTRESS NOTED; VSS; DR OCAMPO AWARE OF GLUOSE LEVEL; WILL MEDICATE WHEN AVAILABLE; CALL LIGHT WITHIN
REACH; WILL CONTINUE TO MONITOR

## 2019-12-20 NOTE — NUR
PT ARRIVED FROM ER VIA STRETCHER ACCOMPANIED BY STAFF. IV SITE IS FREE FROM
REDNESS OR EDEMA. PT C/O ITCHING. ON BILATERAL ARMS FOR 1 WEEK. CONTINUE TO
OBSERVE AND MONITOR.

## 2019-12-20 NOTE — NUR
PT C/O SOB; DR OCAMPO AT BEDSIDE FOR ASSESSMENT; EKG DONE; NO CHANGES NOTED; NEW
ORDERS RECIEVED WILL CONTINUE TO MONITOR

## 2019-12-20 NOTE — NUR
Admission Note
 
 
Report Given to:         ANTONY CAMP
Transported by:           Wheelchair          X Stretcher
 
Transported with:        X Nurse     Transporter   X Patent IV    O2
                         X Cardiac Monitor

## 2019-12-21 VITALS — SYSTOLIC BLOOD PRESSURE: 140 MMHG | DIASTOLIC BLOOD PRESSURE: 67 MMHG

## 2019-12-21 VITALS — DIASTOLIC BLOOD PRESSURE: 77 MMHG | SYSTOLIC BLOOD PRESSURE: 150 MMHG

## 2019-12-21 VITALS — DIASTOLIC BLOOD PRESSURE: 66 MMHG | SYSTOLIC BLOOD PRESSURE: 133 MMHG

## 2019-12-21 VITALS — DIASTOLIC BLOOD PRESSURE: 81 MMHG | SYSTOLIC BLOOD PRESSURE: 167 MMHG

## 2019-12-21 VITALS — SYSTOLIC BLOOD PRESSURE: 123 MMHG | DIASTOLIC BLOOD PRESSURE: 77 MMHG

## 2019-12-21 VITALS — DIASTOLIC BLOOD PRESSURE: 64 MMHG | SYSTOLIC BLOOD PRESSURE: 131 MMHG

## 2019-12-21 LAB
BILIRUB UR QL STRIP.AUTO: NEGATIVE
COLOR UR AUTO: YELLOW
GLUCOSE UR STRIP.AUTO-MCNC: >=1000 MG/DL
HGB UR QL STRIP.AUTO: (no result)
KETONES UR STRIP.AUTO-MCNC: NEGATIVE MG/DL
LEUKOCYTE ESTERASE UR QL STRIP.AUTO: NEGATIVE
NITRITE UR QL STRIP.AUTO: NEGATIVE
PH UR STRIP.AUTO: 5 [PH] (ref 4.5–8)
PROT UR QL STRIP.AUTO: 100 MG/DL
RBC #/AREA URNS HPF: (no result) RBC/HPF (ref 0–5)
SP GR UR STRIP.AUTO: 1.01
UROBILINOGEN UR QL STRIP.AUTO: 0.2 E.U./DL
WBC #/AREA URNS HPF: (no result) WBC/HPF (ref 0–5)

## 2019-12-21 NOTE — NUR
PT IS SITTING IN THE SIDE OF THE BED. PT STATED PAIN IN BACK 8/10. MEDICATED
PT WITH ULTRAM. PO FLUIDS PROVIDED. PT DENIES ANY OTHER NEEDS AT THIS TIME.
CAll LIGHT IN REACH.

## 2019-12-21 NOTE — NUR
PT IS SITTING IN THE SIDE OF THE BED EATING HIS LUNCH . PT STATED THAT PAIN
MEDICATION HELPED SOME. PT DENIES ANY OTHER NEEDS AT THIS TIME. CALL LIGHT IN
REACH.

## 2019-12-21 NOTE — NUR
PT IS RESTING IN  BED IN HIS LEFT SIDE. PT STATED PAIN IN BACK. MEDICATED PT
WITH TORADOL. EDUCATED PT HOW TO USE THE I.S.  PT DENIES ANY OTHER NEEDS. CALL
LIGHT IN REACH.

## 2019-12-21 NOTE — NUR
WRITER WAS WALKING BY AND NOTICED PT NOT LOOKING LIKE HE FELT WELL, WHEN ASKED
IF HE WAS OKAY PT C/O CHEST PAIN MID-STERNAL 10/10. DENIED SOB OR SWEATS,
MEDICATED W/NITRO, V/S ASSESSED, EKG ORDERED AND TROPONIN. PT REPORTS PAIN
4/10 ON PAIN SCALE AFTER FIRST NITRO.

## 2019-12-21 NOTE — NUR
PT REPORTS FEELING BETTTER, DID NOT REPORT PAIN SCALE NUMBER. PT MEDICATED.
V/S ASSESSED AGAIN AT THIS TIME. 143/72,HR69. POC DISCUSSED W/PT.

## 2019-12-21 NOTE — NUR
PT REPORTS FEELING BETTER, APPEARS TO BE RESTFULL W/EYES CLOSED. PT MEDICATED
AS PM MEDICATION ORDERS PROVIDE. V/S ASSESSED AT THIS TIME, PT APPEARS TO BE
STABLE. WILL CONTINUE TO MONITOR.

## 2019-12-21 NOTE — NUR
PHYSICIAN NOTIFIED OF EKG RESULTS AND PT SYMPTOMATIC C/O PAIN GOING BACK UP.
MEDICATED W/2ND NITRO. NEW ORDERS RECEIVED.

## 2019-12-21 NOTE — NUR
PT CALLED TO REPORT FEELING SOB. 02 PROVIDED AND RESPIRATORY PAIGED. PT
SITTING ON SOB. V/S ASSESSED TO BE STABLE.

## 2019-12-22 VITALS — DIASTOLIC BLOOD PRESSURE: 62 MMHG | SYSTOLIC BLOOD PRESSURE: 138 MMHG

## 2019-12-22 VITALS — DIASTOLIC BLOOD PRESSURE: 70 MMHG | SYSTOLIC BLOOD PRESSURE: 139 MMHG

## 2019-12-22 VITALS — DIASTOLIC BLOOD PRESSURE: 70 MMHG | SYSTOLIC BLOOD PRESSURE: 128 MMHG

## 2019-12-22 VITALS — DIASTOLIC BLOOD PRESSURE: 77 MMHG | SYSTOLIC BLOOD PRESSURE: 149 MMHG

## 2019-12-22 VITALS — SYSTOLIC BLOOD PRESSURE: 116 MMHG | DIASTOLIC BLOOD PRESSURE: 66 MMHG

## 2019-12-22 LAB
ALBUMIN SERPL-MCNC: 3.4 G/DL (ref 3.2–5)
ALP SERPL-CCNC: 114 U/L (ref 38–126)
ANION GAP SERPL CALCULATED.3IONS-SCNC: 14 MMOL/L
AST SERPL-CCNC: 15 U/L (ref 17–59)
BUN SERPL-MCNC: 23 MG/DL (ref 9–20)
BUN/CREAT SERPL: 26
CHLORIDE SERPL-SCNC: 101 MMOL/L (ref 95–108)
CO2 SERPL-SCNC: 21 MMOL/L (ref 22–30)
CREAT SERPL-MCNC: 0.9 MG/DL (ref 0.7–1.3)
ERYTHROCYTE [DISTWIDTH] IN BLOOD BY AUTOMATED COUNT: 15.7 % (ref 11.5–15.5)
HCT VFR BLD AUTO: 34.4 % (ref 39–50)
HGB BLD-MCNC: 10.8 G/DL (ref 14–18)
MCH RBC QN AUTO: 24.5 PG  CALC (ref 26–32)
MCHC RBC AUTO-ENTMCNC: 31.4 G/L CALC (ref 32–36)
MCV RBC AUTO: 78.2 FL  CALC (ref 80–100)
PLATELET # BLD AUTO: 147 THOU/UL (ref 130–400)
POTASSIUM SERPL-SCNC: 4.5 MMOL/L (ref 3.5–5.1)
PROT SERPL-MCNC: 6.9 G/DL (ref 6.3–8.2)
RBC # BLD AUTO: 4.4 MILL/UL (ref 4.7–6.1)
SODIUM SERPL-SCNC: 132 MMOL/L (ref 137–146)

## 2019-12-22 NOTE — NUR
PT TALKING ON THE PHONE; CONTINUE TO COMPLAIN OF LOWER BACK PAIN; WILL
MEDICATE WHEN TIME; CALL BELL WITHIN REACH; WILL CONTINUE TO MONITOR.

## 2019-12-22 NOTE — NUR
PT MEDICATED AS ORDERS PROVIDE FOR PAIN 9/10 ON PAIN SCALE IN LOWER BACK.
URINAL EMPTIED OF 400CC OF DARK YELLOW URINE. DENIES ANY OTHER NEEDS.

## 2019-12-22 NOTE — NUR
PT IN HIGH KAISER'S POSITION; TELE MONITOR IN PLACE; PT C/O LOWER BACK PAIN
7/10, MEDICATED AS ORDERED; #20 RAC IN PLACE, NO REDNESS OR EDEMA NOTED; CALL
BELL WITHIN REACH; WILL CONTINUE TO MONITOR.

## 2019-12-22 NOTE — NUR
PT MEDICATED AS ORDERED FOR C/O LOWER BACK PAIN 8/10; TELE MONITOR IN PLACE;
IV FLUSHES WELL; CALL BELL WITHIN REACH; WILL CONTINUE TO MONITOR.

## 2019-12-22 NOTE — NUR
Discharge instructions given. Patient verbalizes understanding of same.
Discharged in stable condition via Taxi to Home with
*Other. All belongings sent with pt.

## 2019-12-28 ENCOUNTER — HOSPITAL ENCOUNTER (OUTPATIENT)
Dept: HOSPITAL 82 - ED | Age: 59
Setting detail: OBSERVATION
LOS: 2 days | Discharge: HOME | End: 2019-12-30
Attending: INTERNAL MEDICINE | Admitting: INTERNAL MEDICINE
Payer: MEDICARE

## 2019-12-28 VITALS — SYSTOLIC BLOOD PRESSURE: 139 MMHG | DIASTOLIC BLOOD PRESSURE: 68 MMHG

## 2019-12-28 VITALS — HEIGHT: 71 IN | BODY MASS INDEX: 33.02 KG/M2 | WEIGHT: 235.89 LBS

## 2019-12-28 VITALS — SYSTOLIC BLOOD PRESSURE: 134 MMHG | DIASTOLIC BLOOD PRESSURE: 73 MMHG

## 2019-12-28 DIAGNOSIS — E78.5: ICD-10-CM

## 2019-12-28 DIAGNOSIS — J44.9: ICD-10-CM

## 2019-12-28 DIAGNOSIS — I48.92: ICD-10-CM

## 2019-12-28 DIAGNOSIS — R07.9: Primary | ICD-10-CM

## 2019-12-28 DIAGNOSIS — Z86.73: ICD-10-CM

## 2019-12-28 DIAGNOSIS — Z95.1: ICD-10-CM

## 2019-12-28 DIAGNOSIS — Z92.3: ICD-10-CM

## 2019-12-28 DIAGNOSIS — Z92.21: ICD-10-CM

## 2019-12-28 DIAGNOSIS — Z87.891: ICD-10-CM

## 2019-12-28 DIAGNOSIS — K21.9: ICD-10-CM

## 2019-12-28 DIAGNOSIS — D63.8: ICD-10-CM

## 2019-12-28 DIAGNOSIS — Z95.3: ICD-10-CM

## 2019-12-28 DIAGNOSIS — N40.0: ICD-10-CM

## 2019-12-28 DIAGNOSIS — Z79.4: ICD-10-CM

## 2019-12-28 DIAGNOSIS — I25.10: ICD-10-CM

## 2019-12-28 DIAGNOSIS — E11.9: ICD-10-CM

## 2019-12-28 DIAGNOSIS — I11.0: ICD-10-CM

## 2019-12-28 DIAGNOSIS — Z85.819: ICD-10-CM

## 2019-12-28 DIAGNOSIS — Z95.5: ICD-10-CM

## 2019-12-28 DIAGNOSIS — I50.23: ICD-10-CM

## 2019-12-28 DIAGNOSIS — R10.30: ICD-10-CM

## 2019-12-28 LAB
ALBUMIN SERPL-MCNC: 3.7 G/DL (ref 3.2–5)
ALP SERPL-CCNC: 117 U/L (ref 38–126)
AMYLASE SERPL-CCNC: 61 U/L (ref 30–110)
ANION GAP SERPL CALCULATED.3IONS-SCNC: 13 MMOL/L
APTT PPP: 26.4 SECONDS (ref 20–32.5)
AST SERPL-CCNC: 19 U/L (ref 17–59)
BASOPHILS NFR BLD AUTO: 1 % (ref 0–3)
BUN SERPL-MCNC: 24 MG/DL (ref 9–20)
BUN/CREAT SERPL: 24
CHLORIDE SERPL-SCNC: 103 MMOL/L (ref 95–108)
CO2 SERPL-SCNC: 21 MMOL/L (ref 22–30)
CREAT SERPL-MCNC: 1 MG/DL (ref 0.7–1.3)
D DIMER PPP FEU-MCNC: 0.45 MG/L (ref 0.19–0.6)
EOSINOPHIL NFR BLD AUTO: 1 % (ref 0–8)
ERYTHROCYTE [DISTWIDTH] IN BLOOD BY AUTOMATED COUNT: 15.7 % (ref 11.5–15.5)
HCT VFR BLD AUTO: 32.5 % (ref 39–50)
HGB BLD-MCNC: 10 G/DL (ref 14–18)
IMM GRANULOCYTES NFR BLD: 1.1 % (ref 0–5)
INR PPP: 1 RATIO (ref 0.7–1.3)
LIPASE SERPL-CCNC: 22 U/L (ref 23–300)
LYMPHOCYTES NFR BLD: 24 % (ref 15–41)
MCH RBC QN AUTO: 24.6 PG  CALC (ref 26–32)
MCHC RBC AUTO-ENTMCNC: 30.8 G/L CALC (ref 32–36)
MCV RBC AUTO: 79.9 FL  CALC (ref 80–100)
MONOCYTES NFR BLD AUTO: 9 % (ref 2–13)
NEUTROPHILS # BLD AUTO: 2.8 THOU/UL (ref 1.82–7.42)
NEUTROPHILS NFR BLD AUTO: 64 % (ref 42–76)
PLATELET # BLD AUTO: 150 THOU/UL (ref 130–400)
POTASSIUM SERPL-SCNC: 4.4 MMOL/L (ref 3.5–5.1)
PROT SERPL-MCNC: 7.4 G/DL (ref 6.3–8.2)
PROTHROMBIN TIME: 10.3 SECONDS (ref 9–12.5)
RBC # BLD AUTO: 4.07 MILL/UL (ref 4.7–6.1)
SODIUM SERPL-SCNC: 133 MMOL/L (ref 137–146)

## 2019-12-28 PROCEDURE — G0378 HOSPITAL OBSERVATION PER HR: HCPCS

## 2019-12-29 VITALS — DIASTOLIC BLOOD PRESSURE: 67 MMHG | SYSTOLIC BLOOD PRESSURE: 146 MMHG

## 2019-12-29 VITALS — SYSTOLIC BLOOD PRESSURE: 139 MMHG | DIASTOLIC BLOOD PRESSURE: 68 MMHG

## 2019-12-29 VITALS — SYSTOLIC BLOOD PRESSURE: 144 MMHG | DIASTOLIC BLOOD PRESSURE: 71 MMHG

## 2019-12-29 VITALS — DIASTOLIC BLOOD PRESSURE: 78 MMHG | SYSTOLIC BLOOD PRESSURE: 179 MMHG

## 2019-12-29 VITALS — SYSTOLIC BLOOD PRESSURE: 158 MMHG | DIASTOLIC BLOOD PRESSURE: 70 MMHG

## 2019-12-29 VITALS — DIASTOLIC BLOOD PRESSURE: 60 MMHG | SYSTOLIC BLOOD PRESSURE: 149 MMHG

## 2019-12-29 VITALS — SYSTOLIC BLOOD PRESSURE: 154 MMHG | DIASTOLIC BLOOD PRESSURE: 84 MMHG

## 2019-12-29 LAB
ANION GAP SERPL CALCULATED.3IONS-SCNC: 14 MMOL/L
BUN SERPL-MCNC: 18 MG/DL (ref 9–20)
BUN/CREAT SERPL: 22
CHLORIDE SERPL-SCNC: 104 MMOL/L (ref 95–108)
CO2 SERPL-SCNC: 20 MMOL/L (ref 22–30)
CREAT SERPL-MCNC: 0.8 MG/DL (ref 0.7–1.3)
ERYTHROCYTE [DISTWIDTH] IN BLOOD BY AUTOMATED COUNT: 16 % (ref 11.5–15.5)
HCT VFR BLD AUTO: 33.5 % (ref 39–50)
HGB BLD-MCNC: 9.9 G/DL (ref 14–18)
MAGNESIUM SERPL-MCNC: 1.8 MG/DL (ref 1.6–2.3)
MCH RBC QN AUTO: 24.4 PG  CALC (ref 26–32)
MCHC RBC AUTO-ENTMCNC: 29.6 G/L CALC (ref 32–36)
MCV RBC AUTO: 82.5 FL  CALC (ref 80–100)
PLATELET # BLD AUTO: 154 THOU/UL (ref 130–400)
POTASSIUM SERPL-SCNC: 4.4 MMOL/L (ref 3.5–5.1)
RBC # BLD AUTO: 4.06 MILL/UL (ref 4.7–6.1)
SODIUM SERPL-SCNC: 134 MMOL/L (ref 137–146)

## 2019-12-30 VITALS — SYSTOLIC BLOOD PRESSURE: 148 MMHG | DIASTOLIC BLOOD PRESSURE: 80 MMHG

## 2019-12-30 VITALS — DIASTOLIC BLOOD PRESSURE: 75 MMHG | SYSTOLIC BLOOD PRESSURE: 140 MMHG

## 2019-12-30 VITALS — DIASTOLIC BLOOD PRESSURE: 86 MMHG | SYSTOLIC BLOOD PRESSURE: 167 MMHG

## 2019-12-30 VITALS — SYSTOLIC BLOOD PRESSURE: 151 MMHG | DIASTOLIC BLOOD PRESSURE: 75 MMHG

## 2019-12-30 LAB
ANION GAP SERPL CALCULATED.3IONS-SCNC: 12 MMOL/L
BASOPHILS NFR BLD AUTO: 1 % (ref 0–3)
BUN SERPL-MCNC: 14 MG/DL (ref 9–20)
BUN/CREAT SERPL: 17
CHLORIDE SERPL-SCNC: 105 MMOL/L (ref 95–108)
CO2 SERPL-SCNC: 22 MMOL/L (ref 22–30)
CREAT SERPL-MCNC: 0.8 MG/DL (ref 0.7–1.3)
EOSINOPHIL NFR BLD AUTO: 1 % (ref 0–8)
ERYTHROCYTE [DISTWIDTH] IN BLOOD BY AUTOMATED COUNT: 15.6 % (ref 11.5–15.5)
HCT VFR BLD AUTO: 33.2 % (ref 39–50)
HGB BLD-MCNC: 10.3 G/DL (ref 14–18)
IMM GRANULOCYTES NFR BLD: 1 % (ref 0–5)
LYMPHOCYTES NFR BLD: 20 % (ref 15–41)
MAGNESIUM SERPL-MCNC: 1.8 MG/DL (ref 1.6–2.3)
MCH RBC QN AUTO: 24.9 PG  CALC (ref 26–32)
MCHC RBC AUTO-ENTMCNC: 31 G/L CALC (ref 32–36)
MCV RBC AUTO: 80.4 FL  CALC (ref 80–100)
MONOCYTES NFR BLD AUTO: 11 % (ref 2–13)
NEUTROPHILS # BLD AUTO: 2.75 THOU/UL (ref 1.82–7.42)
NEUTROPHILS NFR BLD AUTO: 66 % (ref 42–76)
PLATELET # BLD AUTO: 153 THOU/UL (ref 130–400)
POTASSIUM SERPL-SCNC: 4.2 MMOL/L (ref 3.5–5.1)
RBC # BLD AUTO: 4.13 MILL/UL (ref 4.7–6.1)
SODIUM SERPL-SCNC: 135 MMOL/L (ref 137–146)

## 2020-05-19 ENCOUNTER — HOSPITAL ENCOUNTER (OUTPATIENT)
Dept: HOSPITAL 82 - ED | Age: 60
Setting detail: OBSERVATION
LOS: 3 days | Discharge: HOME HEALTH | End: 2020-05-22
Attending: INTERNAL MEDICINE | Admitting: INTERNAL MEDICINE
Payer: MEDICARE

## 2020-05-19 VITALS — SYSTOLIC BLOOD PRESSURE: 153 MMHG | DIASTOLIC BLOOD PRESSURE: 76 MMHG

## 2020-05-19 VITALS — DIASTOLIC BLOOD PRESSURE: 76 MMHG | SYSTOLIC BLOOD PRESSURE: 163 MMHG

## 2020-05-19 VITALS — DIASTOLIC BLOOD PRESSURE: 69 MMHG | SYSTOLIC BLOOD PRESSURE: 153 MMHG

## 2020-05-19 VITALS — WEIGHT: 213.85 LBS | BODY MASS INDEX: 29.94 KG/M2 | HEIGHT: 71 IN

## 2020-05-19 DIAGNOSIS — I25.118: ICD-10-CM

## 2020-05-19 DIAGNOSIS — I10: ICD-10-CM

## 2020-05-19 DIAGNOSIS — Z91.14: ICD-10-CM

## 2020-05-19 DIAGNOSIS — Z95.3: ICD-10-CM

## 2020-05-19 DIAGNOSIS — E87.1: ICD-10-CM

## 2020-05-19 DIAGNOSIS — K59.00: ICD-10-CM

## 2020-05-19 DIAGNOSIS — Z87.891: ICD-10-CM

## 2020-05-19 DIAGNOSIS — Z95.5: ICD-10-CM

## 2020-05-19 DIAGNOSIS — Z95.1: ICD-10-CM

## 2020-05-19 DIAGNOSIS — I48.91: ICD-10-CM

## 2020-05-19 DIAGNOSIS — E11.65: Primary | ICD-10-CM

## 2020-05-19 DIAGNOSIS — N40.1: ICD-10-CM

## 2020-05-19 DIAGNOSIS — R33.8: ICD-10-CM

## 2020-05-19 DIAGNOSIS — U07.1: ICD-10-CM

## 2020-05-19 DIAGNOSIS — Z86.73: ICD-10-CM

## 2020-05-19 DIAGNOSIS — K64.9: ICD-10-CM

## 2020-05-19 DIAGNOSIS — Z79.4: ICD-10-CM

## 2020-05-19 LAB
ALBUMIN SERPL-MCNC: 3.6 G/DL (ref 3.2–5)
ALP SERPL-CCNC: 130 U/L (ref 38–126)
ANION GAP SERPL CALCULATED.3IONS-SCNC: 19 MMOL/L
ANION GAP SERPL CALCULATED.3IONS-SCNC: 21 MMOL/L
AST SERPL-CCNC: 16 U/L (ref 17–59)
BASOPHILS NFR BLD AUTO: 1 % (ref 0–3)
BILIRUB UR QL STRIP.AUTO: NEGATIVE
BUN SERPL-MCNC: 34 MG/DL (ref 9–20)
BUN SERPL-MCNC: 40 MG/DL (ref 9–20)
BUN/CREAT SERPL: 25
BUN/CREAT SERPL: 29
CHLORIDE SERPL-SCNC: 88 MMOL/L (ref 95–108)
CHLORIDE SERPL-SCNC: 91 MMOL/L (ref 95–108)
CO2 SERPL-SCNC: 17 MMOL/L (ref 22–30)
CO2 SERPL-SCNC: 22 MMOL/L (ref 22–30)
COLOR UR AUTO: YELLOW
CREAT SERPL-MCNC: 1.2 MG/DL (ref 0.7–1.3)
CREAT SERPL-MCNC: 1.6 MG/DL (ref 0.7–1.3)
EOSINOPHIL NFR BLD AUTO: 0 % (ref 0–8)
EPI CELLS URNS QL MICRO: (no result) EPI/HPF
ERYTHROCYTE [DISTWIDTH] IN BLOOD BY AUTOMATED COUNT: 14.6 % (ref 11.5–15.5)
GLUCOSE UR STRIP.AUTO-MCNC: >=1000 MG/DL
HCT VFR BLD AUTO: 41.5 % (ref 39–50)
HGB BLD-MCNC: 14.2 G/DL (ref 14–18)
HGB UR QL STRIP.AUTO: (no result)
IMM GRANULOCYTES NFR BLD: 2.6 % (ref 0–5)
KETONES UR STRIP.AUTO-MCNC: 15 MG/DL
LEUKOCYTE ESTERASE UR QL STRIP.AUTO: NEGATIVE
LIPASE SERPL-CCNC: 41 U/L (ref 23–300)
LYMPHOCYTES NFR BLD: 16 % (ref 15–41)
MCH RBC QN AUTO: 25.6 PG  CALC (ref 26–32)
MCHC RBC AUTO-ENTMCNC: 34.2 G/DL CAL (ref 32–36)
MCV RBC AUTO: 74.9 FL  CALC (ref 80–100)
MONOCYTES NFR BLD AUTO: 10 % (ref 2–13)
NEUTROPHILS # BLD AUTO: 5.29 THOU/UL (ref 1.82–7.42)
NEUTROPHILS NFR BLD AUTO: 72 % (ref 42–76)
NITRITE UR QL STRIP.AUTO: NEGATIVE
PH UR STRIP.AUTO: 5 [PH] (ref 4.5–8)
PLATELET # BLD AUTO: 228 THOU/UL (ref 130–400)
POTASSIUM SERPL-SCNC: 4 MMOL/L (ref 3.5–5.1)
POTASSIUM SERPL-SCNC: 4.3 MMOL/L (ref 3.5–5.1)
PROT SERPL-MCNC: 6.5 G/DL (ref 6.3–8.2)
PROT UR QL STRIP.AUTO: 30 MG/DL
RBC # BLD AUTO: 5.54 MILL/UL (ref 4.7–6.1)
RBC #/AREA URNS HPF: (no result) RBC/HPF (ref 0–5)
SERVICE CMNT 15-IMP: (no result) HPF
SODIUM SERPL-SCNC: 125 MMOL/L (ref 137–146)
SODIUM SERPL-SCNC: 125 MMOL/L (ref 137–146)
SP GR UR STRIP.AUTO: 1.01
UROBILINOGEN UR QL STRIP.AUTO: 0.2 E.U./DL

## 2020-05-19 PROCEDURE — 0T9B70Z DRAINAGE OF BLADDER WITH DRAINAGE DEVICE, VIA NATURAL OR ARTIFICIAL OPENING: ICD-10-PCS | Performed by: FAMILY MEDICINE

## 2020-05-19 PROCEDURE — G0378 HOSPITAL OBSERVATION PER HR: HCPCS

## 2020-05-19 NOTE — NUR
RECEIVED A PHONE CALL FROM LAB PATIENT HAS CRITICALLY DE BLOOD GLUCOSE 529,
CALLED DR. ANDRADE AND ORDERED NOVOLIN REGULAR 10 UNITS SUBCUTANEOUSLY, AND
PATIENT C/O ABDOMINAL PAIN AND CONSTIPATION WITH ORDERS MADE AND SENT TO
PHARMACY.

## 2020-05-19 NOTE — NUR
PT ALERT/ORIENTED, STATES BROUGHT LUGGAGE WITH HIM, BECAUSE HE ALWAYS STAYS
WHEN HE COMES TO HOSPITAL.  BROUGHT ALL OF HIS DRUG BOTTLES WITH HIM.  STATES
HAS TAKEN ALL THE MEDICATIONS TODAY THAT HE WAS SUPPOSE TO.

## 2020-05-19 NOTE — NUR
PT REMAINS ALERT/ORIENTED X3.  STATES ABDOMIN FEELS BETTER, NO URGENCY TO
URINATE.  WATCHING TV AT THIS TIME

## 2020-05-19 NOTE — NUR
REPEAT ACCUCHEK READS CRITICALLY HIGH CALLED DR. ANDRADE AND ORDERED TO
TRANSFER TO ICU TO START ON INSULIN DRIP DKA PROTOCOL.

## 2020-05-19 NOTE — NUR
rec'd from medsurg per bed to icu 8. report rec'd per chun erickson. cardiac
monitor shows a fib pvcs hr 98. #20 rfa & lfa saline locks. shay cath in
place. urine clear yellow. oriented to room. fall precautions cont.

## 2020-05-19 NOTE — NUR
RECEIVED REPORT FROM NURSE PAWEL, PATIENT ARRIVED TO FLOOR TRANSPORTED VIA BED
AT 1910, PATIENT IN ROOM 281 ON  [place] AND CONTACT ISOLATION.

## 2020-05-19 NOTE — NUR
COVID TEST CAME BACK POSITIVE.  NOTIFIED MED SURG AND WILL WAIT FOR DIFFERENT
ROOM NUMBER AT THIS TIME

## 2020-05-20 VITALS — DIASTOLIC BLOOD PRESSURE: 55 MMHG | SYSTOLIC BLOOD PRESSURE: 130 MMHG

## 2020-05-20 VITALS — DIASTOLIC BLOOD PRESSURE: 86 MMHG | SYSTOLIC BLOOD PRESSURE: 134 MMHG

## 2020-05-20 VITALS — DIASTOLIC BLOOD PRESSURE: 53 MMHG | SYSTOLIC BLOOD PRESSURE: 95 MMHG

## 2020-05-20 VITALS — DIASTOLIC BLOOD PRESSURE: 80 MMHG | SYSTOLIC BLOOD PRESSURE: 135 MMHG

## 2020-05-20 VITALS — DIASTOLIC BLOOD PRESSURE: 63 MMHG | SYSTOLIC BLOOD PRESSURE: 135 MMHG

## 2020-05-20 VITALS — DIASTOLIC BLOOD PRESSURE: 65 MMHG | SYSTOLIC BLOOD PRESSURE: 149 MMHG

## 2020-05-20 VITALS — SYSTOLIC BLOOD PRESSURE: 120 MMHG | DIASTOLIC BLOOD PRESSURE: 68 MMHG

## 2020-05-20 VITALS — SYSTOLIC BLOOD PRESSURE: 131 MMHG | DIASTOLIC BLOOD PRESSURE: 67 MMHG

## 2020-05-20 VITALS — SYSTOLIC BLOOD PRESSURE: 164 MMHG | DIASTOLIC BLOOD PRESSURE: 79 MMHG

## 2020-05-20 VITALS — DIASTOLIC BLOOD PRESSURE: 64 MMHG | SYSTOLIC BLOOD PRESSURE: 148 MMHG

## 2020-05-20 VITALS — SYSTOLIC BLOOD PRESSURE: 156 MMHG | DIASTOLIC BLOOD PRESSURE: 74 MMHG

## 2020-05-20 VITALS — DIASTOLIC BLOOD PRESSURE: 68 MMHG | SYSTOLIC BLOOD PRESSURE: 137 MMHG

## 2020-05-20 VITALS — DIASTOLIC BLOOD PRESSURE: 72 MMHG | SYSTOLIC BLOOD PRESSURE: 142 MMHG

## 2020-05-20 VITALS — SYSTOLIC BLOOD PRESSURE: 159 MMHG | DIASTOLIC BLOOD PRESSURE: 76 MMHG

## 2020-05-20 VITALS — SYSTOLIC BLOOD PRESSURE: 125 MMHG | DIASTOLIC BLOOD PRESSURE: 57 MMHG

## 2020-05-20 VITALS — DIASTOLIC BLOOD PRESSURE: 76 MMHG | SYSTOLIC BLOOD PRESSURE: 152 MMHG

## 2020-05-20 VITALS — SYSTOLIC BLOOD PRESSURE: 159 MMHG | DIASTOLIC BLOOD PRESSURE: 77 MMHG

## 2020-05-20 VITALS — DIASTOLIC BLOOD PRESSURE: 65 MMHG | SYSTOLIC BLOOD PRESSURE: 114 MMHG

## 2020-05-20 VITALS — SYSTOLIC BLOOD PRESSURE: 158 MMHG | DIASTOLIC BLOOD PRESSURE: 83 MMHG

## 2020-05-20 VITALS — DIASTOLIC BLOOD PRESSURE: 89 MMHG | SYSTOLIC BLOOD PRESSURE: 169 MMHG

## 2020-05-20 VITALS — DIASTOLIC BLOOD PRESSURE: 76 MMHG | SYSTOLIC BLOOD PRESSURE: 160 MMHG

## 2020-05-20 LAB
ANION GAP SERPL CALCULATED.3IONS-SCNC: 11 MMOL/L
BUN SERPL-MCNC: 38 MG/DL (ref 9–20)
BUN/CREAT SERPL: 32
CHLORIDE SERPL-SCNC: 98 MMOL/L (ref 95–108)
CO2 SERPL-SCNC: 24 MMOL/L (ref 22–30)
CREAT SERPL-MCNC: 1.2 MG/DL (ref 0.7–1.3)
CRP SERPL-MCNC: 6.4 MG/DL (ref 0–0.9)
POTASSIUM SERPL-SCNC: 3.9 MMOL/L (ref 3.5–5.1)
SODIUM SERPL-SCNC: 128 MMOL/L (ref 137–146)

## 2020-05-20 NOTE — NUR
PT AWAKE AND ALERT.  RELATED RECTAL PAIN SLIGHTLY IMPROVED.  NO NEEDS AT THIS
TIME.  CALL BELL IN REACH.

## 2020-05-20 NOTE — NUR
PT AMBULATED TO BATHROOM WITH STAND BY ASSIST. PT CRYING FROM HEMORRHOID PAIN.
NO PAIN MEDS DUE AT THIS TIME. DR. GARDINER NOTIFIED. WILL CONTINUE TO MONITOR.

## 2020-05-20 NOTE — NUR
PT RESTING IN BED WITH EYES CLOSED. NO DISTRESS NOTED. DENIES PAIN AT THIS
TIME. NO SOB NOTED. WILL CONTINUE TO MONITOR

## 2020-05-20 NOTE — NUR
DR. GARDINER NOTIFIED OF PATIENT RECTAL PAIN AND PATIENT COMPLANING OF HARD
STOOL. ORDERS PLACED. WILL CONTINUE TO MONITOR

## 2020-05-20 NOTE — NUR
PT AMB SELF TO TOILET.  HAD BM, C/O PAIN TO ANUS FROM ADDITIONAL STOOL.  PT
RETURNED TO BED.  CALL BELL IN REACH.

## 2020-05-20 NOTE — NUR
PT EATING IN BED. NO DISTRESS NOTED. PAIN MEDICATIONS GIVEN. SUPPOSITORY
GIVEN. MIRALAX GIVEN. LOPEZ. AFEBRILE. PT ON RA. REPORT TO BE GIVEN TO NIGHT
NURSE.

## 2020-05-21 VITALS — DIASTOLIC BLOOD PRESSURE: 50 MMHG | SYSTOLIC BLOOD PRESSURE: 120 MMHG

## 2020-05-21 VITALS — DIASTOLIC BLOOD PRESSURE: 67 MMHG | SYSTOLIC BLOOD PRESSURE: 131 MMHG

## 2020-05-21 VITALS — SYSTOLIC BLOOD PRESSURE: 156 MMHG | DIASTOLIC BLOOD PRESSURE: 75 MMHG

## 2020-05-21 VITALS — SYSTOLIC BLOOD PRESSURE: 176 MMHG | DIASTOLIC BLOOD PRESSURE: 82 MMHG

## 2020-05-21 VITALS — DIASTOLIC BLOOD PRESSURE: 69 MMHG | SYSTOLIC BLOOD PRESSURE: 134 MMHG

## 2020-05-21 VITALS — SYSTOLIC BLOOD PRESSURE: 125 MMHG | DIASTOLIC BLOOD PRESSURE: 66 MMHG

## 2020-05-21 VITALS — DIASTOLIC BLOOD PRESSURE: 62 MMHG | SYSTOLIC BLOOD PRESSURE: 134 MMHG

## 2020-05-21 VITALS — DIASTOLIC BLOOD PRESSURE: 69 MMHG | SYSTOLIC BLOOD PRESSURE: 144 MMHG

## 2020-05-21 VITALS — SYSTOLIC BLOOD PRESSURE: 148 MMHG | DIASTOLIC BLOOD PRESSURE: 62 MMHG

## 2020-05-21 VITALS — SYSTOLIC BLOOD PRESSURE: 136 MMHG | DIASTOLIC BLOOD PRESSURE: 71 MMHG

## 2020-05-21 LAB
ANION GAP SERPL CALCULATED.3IONS-SCNC: 11 MMOL/L
BUN SERPL-MCNC: 29 MG/DL (ref 9–20)
BUN/CREAT SERPL: 29
CHLORIDE SERPL-SCNC: 98 MMOL/L (ref 95–108)
CO2 SERPL-SCNC: 26 MMOL/L (ref 22–30)
CREAT SERPL-MCNC: 1 MG/DL (ref 0.7–1.3)
ERYTHROCYTE [DISTWIDTH] IN BLOOD BY AUTOMATED COUNT: 14.7 % (ref 11.5–15.5)
HCT VFR BLD AUTO: 40.3 % (ref 39–50)
HGB BLD-MCNC: 13.1 G/DL (ref 14–18)
MCH RBC QN AUTO: 25.4 PG  CALC (ref 26–32)
MCHC RBC AUTO-ENTMCNC: 32.5 G/DL CAL (ref 32–36)
MCV RBC AUTO: 78.3 FL  CALC (ref 80–100)
PLATELET # BLD AUTO: 196 THOU/UL (ref 130–400)
POTASSIUM SERPL-SCNC: 4.3 MMOL/L (ref 3.5–5.1)
RBC # BLD AUTO: 5.15 MILL/UL (ref 4.7–6.1)
SODIUM SERPL-SCNC: 130 MMOL/L (ref 137–146)

## 2020-05-21 NOTE — NUR
LOPEZ CATH REMOVED, INTACT UPON REMOVAL. PT TOLERATED WELL. SLIGHT DISCHARGE
NOTED AFTER REMOVAL. PT C/O OF HIS PENIS BEING TENDER SHORTLY AFTER PROCEDURE
BUT STATES IT SUBSIDED AFTER A FEW MINUTES. WILL CONTINUE TO MONITOR FOR ANY
DRAINAGE, TENDERNESS OR SWELLING. NO OTHER NEEDS AT THIS TIME. PT ADVISED TO
CALL AFTER FIRST URINATION, INSTRUCTIONS GIVEN TO NOTIFY US IF HE HAS NOT BEEN
ABLE TO URINATE. PT VERBALIZED UNDERSTANDING. CALL LIGHT IN REACH. CONTINUE TO
MONITOR.

## 2020-05-21 NOTE — NUR
ORDERS FOR ATIVAN 0.5 MG Q8 PRN FROM DR REMY DAVIDSON FOR AGITATION AND ANXIETY.
ORDER FAXED TO PHARMACY.

## 2020-05-21 NOTE — NUR
PT MEDICATED AS ORDERED. ASSESSMENT COMPLETE. SNACK PROVIDED AT THIS TIME. PT
STILL HAS NOT VOIDED. ENCOURAGED PT TO TRY AFTER SNACK AND WILL CONTINUE TO
MONITOR.

## 2020-05-21 NOTE — NUR
PT ARRVIED TO MS VIA  ACCOMPANIED BY FAHAD DAO. STEADY GAIT OBSERVED. PT
C/O OF LOWER ABD PAIN THAT RADIATES TO THE RECTUM. LORTAB GIVEN FOR PAIN.
ASSESSMENT COMPLETED. LOPEZ CATHETER IN PLACE DRAINING VIA GRAVITY WITH CLEAR
YELLOW URINE NOTED. PER REMY REMOVE LOPEZ. DISCUSSED POC. CALL LIGHT IN REACH.
CONTINUE TO MONITOR.

## 2020-05-21 NOTE — NUR
REPORT FROM DARIN HARDY. PT NOTED RESTING IN BED ON LEFT SIDE. NO APPARENT
DISTRESS NOTED. PT STATES STILL HAS NOT VOIDED SINCE LOPEZ REMOVED. DISCUSSED
POC. PT VERBALIZED UNDERSTANDING. IV SITE APPEARS HEALTHY. TELEMETRY MONITOR
IN PLACE. CALL LIGHT WITHIN REACH. WILL CONTINUE TO MONITOR.

## 2020-05-21 NOTE — NUR
PT ASSESSED, RESTING IN BED, ALERT AND ORIENTED X3, DENIES PAIN OR DISCOMFORT
AT THIS TIME, AFEBRILE. SEE PROCESS INTERVENTIONS FOR FULL ASSESSMENT.

## 2020-05-21 NOTE — NUR
IN WITH PT, PT TO BATHROOM, PT ON TOILET CRYING STATING THAT HE DOES NOT WANT
TO LEAVE AT THIS TIME, DR GARDINER NOTIFIED. PT HAD A FORMED MODERATE STOOL.

## 2020-05-21 NOTE — NUR
PT TRANSFERED TO [facility]  VIA WHEELCHAIR. NURSE WEBSTER AT BEDSIDE, PT
TOLERATED WELL.

## 2020-05-22 VITALS — SYSTOLIC BLOOD PRESSURE: 126 MMHG | DIASTOLIC BLOOD PRESSURE: 56 MMHG

## 2020-05-22 VITALS — SYSTOLIC BLOOD PRESSURE: 154 MMHG | DIASTOLIC BLOOD PRESSURE: 90 MMHG

## 2020-05-22 VITALS — DIASTOLIC BLOOD PRESSURE: 77 MMHG | SYSTOLIC BLOOD PRESSURE: 164 MMHG

## 2020-05-22 VITALS — SYSTOLIC BLOOD PRESSURE: 142 MMHG | DIASTOLIC BLOOD PRESSURE: 66 MMHG

## 2020-05-22 VITALS — DIASTOLIC BLOOD PRESSURE: 90 MMHG | SYSTOLIC BLOOD PRESSURE: 180 MMHG

## 2020-05-22 NOTE — NUR
PT LAYING IN BED. A&O X3. PT C/O OF RECTAL PAIN. LORTAB GIVEN. LOPEZ CATHETER
DRAINING VIA GRAVITY WITH YELLOW URINE NOTED. NO OTHER NEEDS AT THIS TIME.
DISCUSSED POC. CALL LIGHT IN REACH. CONTINUE TO MONITOR.

## 2020-05-22 NOTE — NUR
Discharge instructions given. Patient verbalizes understanding of same.
Discharged in stable condition via Wheelchair to Home with home health; nurse
on call staff. All belongings sent with pt.

## 2020-05-22 NOTE — NUR
PT REQUESTS TO HAVE PHYSICAN ORDER "BUTT CREAM" TO HELP HIM WITH THE PAIN.
EXPLAINED THAT MD WOULD BE ADVISED

## 2020-05-22 NOTE — NUR
PT C/O LOWER ABD PAIN AND NOT BEING ABLE TO VOID. PT TEARFUL. BLADDER NOTABLY
DISTENDED.  BLADDER SCAN DONE WITH GREATER THAN 500ML NOTED AT THIS TIME.
ORDERS RECEIVED FROM ON CALL PHYSICIAN TO PUT LOPEZ BACK IN. 16F INSERTED
USING STERILE TECHINIQUE, X1 ATTEMPT, STAT LOCK TO RIGHT THIGH, PT TOLERATED
WELL. 800ML CLEAR YELLOW OUTPUT IMMEDIATELY NOTED. PT ALSO MEDICATED FOR PAIN
AT THIS TIME. FRESH ICE WATER PROVIDED. CALL LIGHT WITHIN REACH. WILL CONTINUE
TO MONITOR.

## 2020-05-24 ENCOUNTER — HOSPITAL ENCOUNTER (INPATIENT)
Dept: HOSPITAL 82 - ED | Age: 60
LOS: 3 days | Discharge: HOME HEALTH | DRG: 377 | End: 2020-05-27
Attending: INTERNAL MEDICINE | Admitting: INTERNAL MEDICINE
Payer: MEDICARE

## 2020-05-24 VITALS — DIASTOLIC BLOOD PRESSURE: 72 MMHG | SYSTOLIC BLOOD PRESSURE: 143 MMHG

## 2020-05-24 VITALS — DIASTOLIC BLOOD PRESSURE: 87 MMHG | SYSTOLIC BLOOD PRESSURE: 187 MMHG

## 2020-05-24 VITALS — BODY MASS INDEX: 29.68 KG/M2 | HEIGHT: 71 IN | WEIGHT: 212 LBS

## 2020-05-24 VITALS — SYSTOLIC BLOOD PRESSURE: 151 MMHG | DIASTOLIC BLOOD PRESSURE: 71 MMHG

## 2020-05-24 DIAGNOSIS — R33.8: ICD-10-CM

## 2020-05-24 DIAGNOSIS — K92.0: Primary | ICD-10-CM

## 2020-05-24 DIAGNOSIS — R10.32: ICD-10-CM

## 2020-05-24 DIAGNOSIS — Z85.819: ICD-10-CM

## 2020-05-24 DIAGNOSIS — Z79.4: ICD-10-CM

## 2020-05-24 DIAGNOSIS — Z86.73: ICD-10-CM

## 2020-05-24 DIAGNOSIS — Z95.5: ICD-10-CM

## 2020-05-24 DIAGNOSIS — Z96.89: ICD-10-CM

## 2020-05-24 DIAGNOSIS — I48.91: ICD-10-CM

## 2020-05-24 DIAGNOSIS — E11.65: ICD-10-CM

## 2020-05-24 DIAGNOSIS — Z92.21: ICD-10-CM

## 2020-05-24 DIAGNOSIS — Z87.891: ICD-10-CM

## 2020-05-24 DIAGNOSIS — U07.1: ICD-10-CM

## 2020-05-24 DIAGNOSIS — I10: ICD-10-CM

## 2020-05-24 DIAGNOSIS — E78.5: ICD-10-CM

## 2020-05-24 DIAGNOSIS — I25.118: ICD-10-CM

## 2020-05-24 DIAGNOSIS — Z95.3: ICD-10-CM

## 2020-05-24 DIAGNOSIS — Z92.3: ICD-10-CM

## 2020-05-24 DIAGNOSIS — N40.1: ICD-10-CM

## 2020-05-24 DIAGNOSIS — E87.1: ICD-10-CM

## 2020-05-24 LAB
ALBUMIN SERPL-MCNC: 3.6 G/DL (ref 3.2–5)
ALP SERPL-CCNC: 257 U/L (ref 38–126)
AMYLASE SERPL-CCNC: 51 U/L (ref 30–110)
ANION GAP SERPL CALCULATED.3IONS-SCNC: 18 MMOL/L
APTT PPP: 26 SECONDS (ref 20–32.5)
AST SERPL-CCNC: 19 U/L (ref 17–59)
BASOPHILS NFR BLD AUTO: 1 % (ref 0–3)
BILIRUB UR QL STRIP.AUTO: NEGATIVE
BUN SERPL-MCNC: 24 MG/DL (ref 9–20)
BUN/CREAT SERPL: 24
CHLORIDE SERPL-SCNC: 95 MMOL/L (ref 95–108)
CO2 SERPL-SCNC: 22 MMOL/L (ref 22–30)
COLOR UR AUTO: YELLOW
CREAT SERPL-MCNC: 1 MG/DL (ref 0.7–1.3)
EOSINOPHIL NFR BLD AUTO: 1 % (ref 0–8)
ERYTHROCYTE [DISTWIDTH] IN BLOOD BY AUTOMATED COUNT: 14 % (ref 11.5–15.5)
GLUCOSE UR STRIP.AUTO-MCNC: >=1000 MG/DL
HCT VFR BLD AUTO: 44.2 % (ref 39–50)
HGB BLD-MCNC: 14.4 G/DL (ref 14–18)
HGB UR QL STRIP.AUTO: (no result)
IMM GRANULOCYTES NFR BLD: 4.4 % (ref 0–5)
INR PPP: 0.9 RATIO (ref 0.7–1.3)
KETONES UR STRIP.AUTO-MCNC: 15 MG/DL
LEUKOCYTE ESTERASE UR QL STRIP.AUTO: NEGATIVE
LIPASE SERPL-CCNC: 40 U/L (ref 23–300)
LYMPHOCYTES NFR BLD: 19 % (ref 15–41)
MCH RBC QN AUTO: 25.4 PG  CALC (ref 26–32)
MCHC RBC AUTO-ENTMCNC: 32.6 G/DL CAL (ref 32–36)
MCV RBC AUTO: 78 FL  CALC (ref 80–100)
MONOCYTES NFR BLD AUTO: 9 % (ref 2–13)
NEUTROPHILS # BLD AUTO: 4.05 THOU/UL (ref 1.82–7.42)
NEUTROPHILS NFR BLD AUTO: 66 % (ref 42–76)
NITRITE UR QL STRIP.AUTO: NEGATIVE
PH UR STRIP.AUTO: 5.5 [PH] (ref 4.5–8)
PLATELET # BLD AUTO: 260 THOU/UL (ref 130–400)
POTASSIUM SERPL-SCNC: 5.2 MMOL/L (ref 3.5–5.1)
PROT SERPL-MCNC: 6.9 G/DL (ref 6.3–8.2)
PROT UR QL STRIP.AUTO: 100 MG/DL
PROTHROMBIN TIME: 9.6 SECONDS (ref 9–12.5)
RBC # BLD AUTO: 5.67 MILL/UL (ref 4.7–6.1)
SERVICE CMNT 15-IMP: (no result) HPF
SODIUM SERPL-SCNC: 130 MMOL/L (ref 137–146)
SP GR UR STRIP.AUTO: 1.01
SQUAMOUS URNS QL MICRO: (no result) EPI/HPF
UROBILINOGEN UR QL STRIP.AUTO: 0.2 E.U./DL

## 2020-05-24 PROCEDURE — G0378 HOSPITAL OBSERVATION PER HR: HCPCS

## 2020-05-24 PROCEDURE — S0164 INJECTION PANTROPRAZOLE: HCPCS

## 2020-05-24 NOTE — NUR
PT STATES FEELING NAUSEATED, MD NOTIFIED AND MEDICATION ADMINISTERED. PT
UPDATED ON PLAN OF CARE. CALL LIGHT WITHIN REACH

## 2020-05-24 NOTE — NUR
PT PRESENTS WITH COMPLAINTS OF 10/10 LOWER ABD PAIN AND REPORTS BLOOD IN EMISIS
THIS MORNING. PT STATES HAVING THREE EPISODES OF VOMIT. PT AOX4. DENIES ANY
OTHER S/S OR PAINS. HE STATES BEING COVID 19. NO SOB. WILL CONTINUE TO MONITOR.

## 2020-05-24 NOTE — NUR
PT WAS ASKED IF THERE WAS ANYWAY SHE CAN GET A RIDE HOME BECAUSE OF THE
MEDIATION THAT IS GOING TO BE [place]. PT STATES SHE CAN HAVE A RIDE. MEDICATION
ADMINISTERED

## 2020-05-24 NOTE — NUR
PT TRANSFERED TO [facility] SURG STABLE AND IN NO DISTRESS. PT CARE ASSUMED TO EMANUEL
 
                                        Admission Note
 
Report Given to:         
Transported by:           Wheelchair          X Stretcher
 
Transported with:        X Nurse     Transporter   X Patent IV    O2
                         X Cardiac Monitor
 
Location:                 ICU      X MS2

## 2020-05-24 NOTE — NUR
MEDICATION GIVIN TO PT FOR HIS PAIN, PT ALSO REQUESTED WARM BLANKET. REQUEST
GRANTED. PT IS RESTING IN STRETCHER WITH TV ON. CALL LIGHT WITHIN REACH

## 2020-05-25 VITALS — SYSTOLIC BLOOD PRESSURE: 139 MMHG | DIASTOLIC BLOOD PRESSURE: 77 MMHG

## 2020-05-25 VITALS — DIASTOLIC BLOOD PRESSURE: 74 MMHG | SYSTOLIC BLOOD PRESSURE: 144 MMHG

## 2020-05-25 VITALS — DIASTOLIC BLOOD PRESSURE: 73 MMHG | SYSTOLIC BLOOD PRESSURE: 149 MMHG

## 2020-05-25 VITALS — DIASTOLIC BLOOD PRESSURE: 81 MMHG | SYSTOLIC BLOOD PRESSURE: 184 MMHG

## 2020-05-25 VITALS — DIASTOLIC BLOOD PRESSURE: 50 MMHG | SYSTOLIC BLOOD PRESSURE: 91 MMHG

## 2020-05-25 LAB
ANION GAP SERPL CALCULATED.3IONS-SCNC: 16 MMOL/L
BASOPHILS NFR BLD AUTO: 1 % (ref 0–3)
BUN SERPL-MCNC: 20 MG/DL (ref 9–20)
BUN/CREAT SERPL: 22
CHLORIDE SERPL-SCNC: 99 MMOL/L (ref 95–108)
CO2 SERPL-SCNC: 22 MMOL/L (ref 22–30)
CREAT SERPL-MCNC: 0.9 MG/DL (ref 0.7–1.3)
EOSINOPHIL NFR BLD AUTO: 1 % (ref 0–8)
ERYTHROCYTE [DISTWIDTH] IN BLOOD BY AUTOMATED COUNT: 14.4 % (ref 11.5–15.5)
HCT VFR BLD AUTO: 41 % (ref 39–50)
HGB BLD-MCNC: 13.4 G/DL (ref 14–18)
IMM GRANULOCYTES NFR BLD: 6.4 % (ref 0–5)
LYMPHOCYTES NFR BLD: 21 % (ref 15–41)
MCH RBC QN AUTO: 26.1 PG  CALC (ref 26–32)
MCHC RBC AUTO-ENTMCNC: 32.7 G/DL CAL (ref 32–36)
MCV RBC AUTO: 79.9 FL  CALC (ref 80–100)
MONOCYTES NFR BLD AUTO: 9 % (ref 2–13)
NEUTROPHILS # BLD AUTO: 4.18 THOU/UL (ref 1.82–7.42)
NEUTROPHILS NFR BLD AUTO: 62 % (ref 42–76)
PLATELET # BLD AUTO: 257 THOU/UL (ref 130–400)
POTASSIUM SERPL-SCNC: 4.7 MMOL/L (ref 3.5–5.1)
RBC # BLD AUTO: 5.13 MILL/UL (ref 4.7–6.1)
SODIUM SERPL-SCNC: 132 MMOL/L (ref 137–146)

## 2020-05-25 NOTE — NUR
PT SEEN AWAKE, ALERT, ORIENTED X 3.  LUNGS CLEAR, 2 LPM.  PT RESTS ON HIS SIDE
AS PRE PERIRECTAL ABSCESS.  PT REMAINS NPO, WAITS FOR DOCTOR VISIT THIS AM.

## 2020-05-25 NOTE — NUR
PT PROVIDED CLEAR LIQUID DIET AS PER ORDER CHANGE BY DR HAHN.  SOME NAUSEA
EARLIER, ZOFRAN GIVEN.  NO COMPLAINT ABOUT PAIN.  SOME MEDS HELD TODAY AS PER
LOW BP, DR HOOVER.

## 2020-05-25 NOTE — NUR
ASSESSMENT COMPLETED. IV SITE BILATERAL AC INTACT AND AND FLUSHED WITH NS. C/O
ABDOMINAL PAIN 8/10 AND MEDICATED WITH ORDERED LORTAB ALONG WITH OTHER SCHED
MEDS, WILL REASSESS. LOPEZ CATHETER INTACT AND DRAINING AT GRAVITY LEVEL.
UPDATED ON POC. PT. REPORTS HE HAD X3 BM'S THIS AM, BUT IS UNSURE OF WHAT
COLOR STOOL WAS; INSTRUCTED PT. TO LEAVE STOOL THE NEXT TIME FOR STAFF TO
INSPECT AND TO NOT FLUSH; VERBALIZES UNDERSTANDING. ENCOURAGED TO CALL FOR ANY
NEEDS. CALL LIGHT IS IN REACH.

## 2020-05-25 NOTE — NUR
AM LABS DRAWN, BILAT AC HEPLOCKS INTACT WITHOUT REDNESS OR EDEMA, BOTH FLUSH
WELL AND HAVE GOOD BLOOD RETURNS.

## 2020-05-26 VITALS — DIASTOLIC BLOOD PRESSURE: 79 MMHG | SYSTOLIC BLOOD PRESSURE: 148 MMHG

## 2020-05-26 VITALS — SYSTOLIC BLOOD PRESSURE: 139 MMHG | DIASTOLIC BLOOD PRESSURE: 55 MMHG

## 2020-05-26 VITALS — SYSTOLIC BLOOD PRESSURE: 132 MMHG | DIASTOLIC BLOOD PRESSURE: 62 MMHG

## 2020-05-26 VITALS — SYSTOLIC BLOOD PRESSURE: 125 MMHG | DIASTOLIC BLOOD PRESSURE: 67 MMHG

## 2020-05-26 VITALS — SYSTOLIC BLOOD PRESSURE: 121 MMHG | DIASTOLIC BLOOD PRESSURE: 69 MMHG

## 2020-05-26 VITALS — SYSTOLIC BLOOD PRESSURE: 129 MMHG | DIASTOLIC BLOOD PRESSURE: 58 MMHG

## 2020-05-26 VITALS — SYSTOLIC BLOOD PRESSURE: 124 MMHG | DIASTOLIC BLOOD PRESSURE: 55 MMHG

## 2020-05-26 LAB
ANION GAP SERPL CALCULATED.3IONS-SCNC: 14 MMOL/L
BASOPHILS NFR BLD AUTO: 1 % (ref 0–3)
BUN SERPL-MCNC: 15 MG/DL (ref 9–20)
BUN/CREAT SERPL: 19
CHLORIDE SERPL-SCNC: 95 MMOL/L (ref 95–108)
CO2 SERPL-SCNC: 22 MMOL/L (ref 22–30)
CREAT SERPL-MCNC: 0.8 MG/DL (ref 0.7–1.3)
EOSINOPHIL NFR BLD AUTO: 1 % (ref 0–8)
ERYTHROCYTE [DISTWIDTH] IN BLOOD BY AUTOMATED COUNT: 14.3 % (ref 11.5–15.5)
HCT VFR BLD AUTO: 38.3 % (ref 39–50)
HGB BLD-MCNC: 12.6 G/DL (ref 14–18)
IMM GRANULOCYTES NFR BLD: 7.8 % (ref 0–5)
LYMPHOCYTES NFR BLD: 22 % (ref 15–41)
MCH RBC QN AUTO: 25.9 PG  CALC (ref 26–32)
MCHC RBC AUTO-ENTMCNC: 32.9 G/DL CAL (ref 32–36)
MCV RBC AUTO: 78.8 FL  CALC (ref 80–100)
MONOCYTES NFR BLD AUTO: 11 % (ref 2–13)
NEUTROPHILS # BLD AUTO: 3.33 THOU/UL (ref 1.82–7.42)
NEUTROPHILS NFR BLD AUTO: 57 % (ref 42–76)
PLATELET # BLD AUTO: 225 THOU/UL (ref 130–400)
POTASSIUM SERPL-SCNC: 4.3 MMOL/L (ref 3.5–5.1)
RBC # BLD AUTO: 4.86 MILL/UL (ref 4.7–6.1)
SODIUM SERPL-SCNC: 126 MMOL/L (ref 137–146)

## 2020-05-26 NOTE — NUR
PT HAD DARK BROWN STOOL, SEEN BY NURSE, NO OVERT EVIDENCE OF BLOOD OR BLACK
STOOL.  PT THEN PROVIDED ANUSOL SUPPOSITORY FOR PAIN RELIEF.

## 2020-05-26 NOTE — NUR
VSS. NO DISTRESS NOTED. TOLERATING CLEAR LIQUIDS WELL. CATHETER BAG EMPTIED.
FRESH WATER PROVIDED. DENIES FURTHER NEEDS.

## 2020-05-26 NOTE — NUR
PT AWAKE, ALERT, ORIENTED X 3.  LUNGS CLEAR, RA.  PT STATES THAT THERE IS
STILL BLOOD IN STOOL, BUT THAT ABSCESS TO PERIRECTAL AREA IS IMPROVED.  NO
SHORNESS OF BREATH, NO DISTRESS.

## 2020-05-26 NOTE — NUR
PT SEEN BY DR GARDINER TODAY, DECISION MADE TO DISCHARGE HOME TOMORROW INSTEAD
OF TODAY.  PT REMAINS AT REST IN THE BED, NO DISTRESS.

## 2020-05-26 NOTE — NUR
PT.C/O LLQ PAIN AND MEDICATED WITH ORDERD PRN LORTAB; WILL REASSESS;VSS.
CATHETER BAG EMPTIED. CALL LIGHT IS IN REACH.

## 2020-05-26 NOTE — NUR
PT PROVIDED LORTAB FOR ABDOMEN AND BUTTOCK SORENESS.  PT STATES THAT IT FEELS
LIKE HE HAS A HOLE IN HIS BOTTOM.

## 2020-05-26 NOTE — NUR
VSS. NO DISTRESS NOTED. MEDICATED WITH ORDERD PRN ONE TIME SONATA FOR SLEEP;
DENIES FURTHER NEEDS. CALL LIGHT IS IN REACH.

## 2020-05-26 NOTE — NUR
ASSESSMENT COMPLETED. NO RESP. DISTRESS NOTED. DENIES NAUSEA/VOMITING.
REPORTS BM EARLIER THIS EVENING NOT INSPECTED BY THIS WRITER. INNER BUTTOCK
NOTED WITH SMALL OPENING PER PT. IT IS WHERE AN ABCESS WAS PREVIOUSLY AND PER
PT. HE SPOKE WITH MD IN REGARDS TO THIS. LOPEZ CATHETER IN PLACE AND DRAINING
AT GRAVITY LEVEL AND UPDATED PT. WITH NEED TO F/U WITH UROLOGY OUTPATIENT.
ENCOURAGED TO CALL FOR ANY NEEDS. C/O ABDOMINAL PAIN 8/10 AND MEDICATED WIH
ORDERED LORTAB, WILL REASSESS. CALL LIGHT IS IN REACH. WILL CONTINUE TO
MONITOR.

## 2020-05-27 VITALS — SYSTOLIC BLOOD PRESSURE: 103 MMHG | DIASTOLIC BLOOD PRESSURE: 46 MMHG

## 2020-05-27 VITALS — SYSTOLIC BLOOD PRESSURE: 113 MMHG | DIASTOLIC BLOOD PRESSURE: 48 MMHG

## 2020-05-27 VITALS — SYSTOLIC BLOOD PRESSURE: 117 MMHG | DIASTOLIC BLOOD PRESSURE: 51 MMHG

## 2020-05-27 LAB
ANION GAP SERPL CALCULATED.3IONS-SCNC: 13 MMOL/L
BUN SERPL-MCNC: 13 MG/DL (ref 9–20)
BUN/CREAT SERPL: 17
CHLORIDE SERPL-SCNC: 96 MMOL/L (ref 95–108)
CO2 SERPL-SCNC: 22 MMOL/L (ref 22–30)
CREAT SERPL-MCNC: 0.8 MG/DL (ref 0.7–1.3)
POTASSIUM SERPL-SCNC: 4.4 MMOL/L (ref 3.5–5.1)
SODIUM SERPL-SCNC: 127 MMOL/L (ref 137–146)

## 2020-05-27 NOTE — NUR
ASSESSMENT IS COMPLTED: IV SITE IS FREE FROM REDNESS OR EDEMA. HR IS
REG,PULSES ARE STRONG X4, ABD IS SOFT WITH ACTIVE BS. BREATH SOUNDS ARE CLEAR,
 BILATERALLY, TELE MONITOR IN PLACE. LOPEZ DRAINING YELLOW URINE CONTINUE TO
OBSERVE AND MONITOR.

## 2020-05-27 NOTE — NUR
IV SITES ARE CDI. NO REDNESS OR EDEMA. PT HAS BEEN RESTING IN BED WITH NO
DISTRESS NOTED. CONITNUE TO OBSERVE AND MONITOR.

## 2020-05-27 NOTE — NUR
PT. C/O ABDOMINAL PAIN AND MEDICATED WITH ORDERED PRN LORTAB, WILL REASSESS.
VSS. LOPEZ CATHETER INTACT AND DRAINING AT GRAVITY LEVEL, EMPTIED AT THIS
TIME. AM LABS DRAWN AS PER ORDER. PT. DENIES FURTHER NEEDS. CALL LIGHT IS IN
REACH.

## 2020-05-27 NOTE — NUR
IV SITES DISCONTINUED CATHETER INTACT. NO REDNESS OR EDEMA.DISCHARGE
INSTRUCTIONS GIVEN AND VERBALIZED UNDERSTANDING. [employer] WAS CALLED FOR PT.
AND PICKED UP BY KADEEM CESPEDES. PT HAD N95 MASK IN PLACE. CONTINUE TO OSBERVE
AND MONITOR.

## 2020-05-28 ENCOUNTER — HOSPITAL ENCOUNTER (EMERGENCY)
Dept: HOSPITAL 82 - ED | Age: 60
Discharge: HOME HEALTH | End: 2020-05-28
Payer: MEDICARE

## 2020-05-28 VITALS — SYSTOLIC BLOOD PRESSURE: 165 MMHG | DIASTOLIC BLOOD PRESSURE: 92 MMHG

## 2020-05-28 DIAGNOSIS — Z86.73: ICD-10-CM

## 2020-05-28 DIAGNOSIS — E11.9: ICD-10-CM

## 2020-05-28 DIAGNOSIS — Z79.4: ICD-10-CM

## 2020-05-28 DIAGNOSIS — R10.84: Primary | ICD-10-CM

## 2020-05-28 DIAGNOSIS — Z95.5: ICD-10-CM

## 2020-05-28 DIAGNOSIS — I10: ICD-10-CM

## 2020-05-28 DIAGNOSIS — I48.91: ICD-10-CM

## 2020-05-28 DIAGNOSIS — Z95.2: ICD-10-CM

## 2020-05-28 LAB
ALBUMIN SERPL-MCNC: 3.4 G/DL (ref 3.2–5)
ALP SERPL-CCNC: 191 U/L (ref 38–126)
ANION GAP SERPL CALCULATED.3IONS-SCNC: 15 MMOL/L
AST SERPL-CCNC: 17 U/L (ref 17–59)
BASOPHILS NFR BLD AUTO: 1 % (ref 0–3)
BILIRUB UR QL STRIP.AUTO: NEGATIVE
BUN SERPL-MCNC: 17 MG/DL (ref 9–20)
BUN/CREAT SERPL: 16
CHLORIDE SERPL-SCNC: 96 MMOL/L (ref 95–108)
CO2 SERPL-SCNC: 23 MMOL/L (ref 22–30)
COLOR UR AUTO: YELLOW
CREAT SERPL-MCNC: 1 MG/DL (ref 0.7–1.3)
EOSINOPHIL NFR BLD AUTO: 1 % (ref 0–8)
ERYTHROCYTE [DISTWIDTH] IN BLOOD BY AUTOMATED COUNT: 14.2 % (ref 11.5–15.5)
GLUCOSE UR STRIP.AUTO-MCNC: >=1000 MG/DL
HCT VFR BLD AUTO: 39.5 % (ref 39–50)
HGB BLD-MCNC: 13.1 G/DL (ref 14–18)
HGB UR QL STRIP.AUTO: (no result)
IMM GRANULOCYTES NFR BLD: 5 % (ref 0–5)
KETONES UR STRIP.AUTO-MCNC: NEGATIVE MG/DL
LEUKOCYTE ESTERASE UR QL STRIP.AUTO: NEGATIVE
LIPASE SERPL-CCNC: 53 U/L (ref 23–300)
LYMPHOCYTES NFR BLD: 19 % (ref 15–41)
MCH RBC QN AUTO: 26.1 PG  CALC (ref 26–32)
MCHC RBC AUTO-ENTMCNC: 33.2 G/DL CAL (ref 32–36)
MCV RBC AUTO: 78.7 FL  CALC (ref 80–100)
MONOCYTES NFR BLD AUTO: 11 % (ref 2–13)
NEUTROPHILS # BLD AUTO: 3.53 THOU/UL (ref 1.82–7.42)
NEUTROPHILS NFR BLD AUTO: 63 % (ref 42–76)
NITRITE UR QL STRIP.AUTO: NEGATIVE
PH UR STRIP.AUTO: 6 [PH] (ref 4.5–8)
PLATELET # BLD AUTO: 247 THOU/UL (ref 130–400)
POTASSIUM SERPL-SCNC: 5.5 MMOL/L (ref 3.5–5.1)
PROT SERPL-MCNC: 6.5 G/DL (ref 6.3–8.2)
PROT UR QL STRIP.AUTO: 100 MG/DL
RBC # BLD AUTO: 5.02 MILL/UL (ref 4.7–6.1)
SODIUM SERPL-SCNC: 128 MMOL/L (ref 137–146)
SP GR UR STRIP.AUTO: 1.01
SQUAMOUS URNS QL MICRO: (no result) EPI/HPF
UROBILINOGEN UR QL STRIP.AUTO: 0.2 E.U./DL

## 2020-06-19 ENCOUNTER — HOSPITAL ENCOUNTER (EMERGENCY)
Dept: HOSPITAL 82 - ED | Age: 60
Discharge: HOME | End: 2020-06-19
Payer: MEDICARE

## 2020-06-19 VITALS — DIASTOLIC BLOOD PRESSURE: 70 MMHG | SYSTOLIC BLOOD PRESSURE: 129 MMHG

## 2020-06-19 VITALS — HEIGHT: 60 IN | WEIGHT: 222.45 LBS | BODY MASS INDEX: 43.67 KG/M2

## 2020-06-19 DIAGNOSIS — R33.9: Primary | ICD-10-CM

## 2020-06-19 DIAGNOSIS — I48.91: ICD-10-CM

## 2020-06-19 DIAGNOSIS — Z95.5: ICD-10-CM

## 2020-06-19 DIAGNOSIS — Z86.73: ICD-10-CM

## 2020-06-19 DIAGNOSIS — E11.9: ICD-10-CM

## 2020-06-19 DIAGNOSIS — U07.1: ICD-10-CM

## 2020-06-19 DIAGNOSIS — Z95.2: ICD-10-CM

## 2020-06-19 DIAGNOSIS — Z79.4: ICD-10-CM

## 2020-06-19 DIAGNOSIS — E87.1: ICD-10-CM

## 2020-06-19 DIAGNOSIS — I10: ICD-10-CM

## 2020-06-19 LAB
ALBUMIN SERPL-MCNC: 4.2 G/DL (ref 3.2–5)
ALP SERPL-CCNC: 142 U/L (ref 38–126)
AMYLASE SERPL-CCNC: 54 U/L (ref 30–110)
ANION GAP SERPL CALCULATED.3IONS-SCNC: 11 MMOL/L
ANION GAP SERPL CALCULATED.3IONS-SCNC: 18 MMOL/L
AST SERPL-CCNC: 16 U/L (ref 17–59)
BASOPHILS NFR BLD AUTO: 1 % (ref 0–3)
BILIRUB UR QL STRIP.AUTO: NEGATIVE
BUN SERPL-MCNC: 29 MG/DL (ref 9–20)
BUN SERPL-MCNC: 31 MG/DL (ref 9–20)
BUN/CREAT SERPL: 24
BUN/CREAT SERPL: 25
CHLORIDE SERPL-SCNC: 91 MMOL/L (ref 95–108)
CHLORIDE SERPL-SCNC: 99 MMOL/L (ref 95–108)
CO2 SERPL-SCNC: 22 MMOL/L (ref 22–30)
CO2 SERPL-SCNC: 24 MMOL/L (ref 22–30)
COLOR UR AUTO: YELLOW
CREAT SERPL-MCNC: 1.2 MG/DL (ref 0.7–1.3)
CREAT SERPL-MCNC: 1.2 MG/DL (ref 0.7–1.3)
EOSINOPHIL NFR BLD AUTO: 1 % (ref 0–8)
ERYTHROCYTE [DISTWIDTH] IN BLOOD BY AUTOMATED COUNT: 14.6 % (ref 11.5–15.5)
GLUCOSE UR STRIP.AUTO-MCNC: >=1000 MG/DL
HCT VFR BLD AUTO: 43.8 % (ref 39–50)
HGB BLD-MCNC: 14.5 G/DL (ref 14–18)
HGB UR QL STRIP.AUTO: NEGATIVE
IMM GRANULOCYTES NFR BLD: 2.2 % (ref 0–5)
KETONES UR STRIP.AUTO-MCNC: NEGATIVE MG/DL
LEUKOCYTE ESTERASE UR QL STRIP.AUTO: NEGATIVE
LIPASE SERPL-CCNC: 55 U/L (ref 23–300)
LYMPHOCYTES NFR BLD: 24 % (ref 15–41)
MCH RBC QN AUTO: 25.9 PG  CALC (ref 26–32)
MCHC RBC AUTO-ENTMCNC: 33.1 G/DL CAL (ref 32–36)
MCV RBC AUTO: 78.4 FL  CALC (ref 80–100)
MONOCYTES NFR BLD AUTO: 10 % (ref 2–13)
MYOGLOBIN SERPL-MCNC: 74 NG/ML (ref 0–121)
NEUTROPHILS # BLD AUTO: 3.71 THOU/UL (ref 1.82–7.42)
NEUTROPHILS NFR BLD AUTO: 63 % (ref 42–76)
NITRITE UR QL STRIP.AUTO: NEGATIVE
PH UR STRIP.AUTO: 5 [PH] (ref 4.5–8)
PLATELET # BLD AUTO: 232 THOU/UL (ref 130–400)
POTASSIUM SERPL-SCNC: 4.1 MMOL/L (ref 3.5–5.1)
POTASSIUM SERPL-SCNC: 5.2 MMOL/L (ref 3.5–5.1)
PROT SERPL-MCNC: 7.6 G/DL (ref 6.3–8.2)
PROT UR QL STRIP.AUTO: 100 MG/DL
RBC # BLD AUTO: 5.59 MILL/UL (ref 4.7–6.1)
RBC #/AREA URNS HPF: (no result) RBC/HPF (ref 0–5)
SODIUM SERPL-SCNC: 126 MMOL/L (ref 137–146)
SODIUM SERPL-SCNC: 130 MMOL/L (ref 137–146)
SP GR UR STRIP.AUTO: 1.01
SQUAMOUS URNS QL MICRO: (no result) EPI/HPF
UROBILINOGEN UR QL STRIP.AUTO: 0.2 E.U./DL

## 2020-06-19 PROCEDURE — 0T9B70Z DRAINAGE OF BLADDER WITH DRAINAGE DEVICE, VIA NATURAL OR ARTIFICIAL OPENING: ICD-10-PCS | Performed by: EMERGENCY MEDICINE

## 2020-06-27 ENCOUNTER — HOSPITAL ENCOUNTER (EMERGENCY)
Dept: HOSPITAL 82 - ED | Age: 60
Discharge: HOME | End: 2020-06-27
Payer: MEDICARE

## 2020-06-27 VITALS — DIASTOLIC BLOOD PRESSURE: 71 MMHG | SYSTOLIC BLOOD PRESSURE: 146 MMHG

## 2020-06-27 VITALS — HEIGHT: 71 IN | BODY MASS INDEX: 30.86 KG/M2 | WEIGHT: 220.46 LBS

## 2020-06-27 DIAGNOSIS — Z95.5: ICD-10-CM

## 2020-06-27 DIAGNOSIS — Z95.2: ICD-10-CM

## 2020-06-27 DIAGNOSIS — R10.31: Primary | ICD-10-CM

## 2020-06-27 DIAGNOSIS — Z95.1: ICD-10-CM

## 2020-06-27 DIAGNOSIS — Z86.19: ICD-10-CM

## 2020-06-27 DIAGNOSIS — Z86.73: ICD-10-CM

## 2020-06-27 DIAGNOSIS — E11.9: ICD-10-CM

## 2020-06-27 DIAGNOSIS — I10: ICD-10-CM

## 2020-06-27 DIAGNOSIS — Z79.4: ICD-10-CM

## 2020-06-27 LAB
ALBUMIN SERPL-MCNC: 3.7 G/DL (ref 3.2–5)
ALP SERPL-CCNC: 132 U/L (ref 38–126)
ANION GAP SERPL CALCULATED.3IONS-SCNC: 13 MMOL/L
AST SERPL-CCNC: 21 U/L (ref 17–59)
BASOPHILS NFR BLD AUTO: 1 % (ref 0–3)
BILIRUB UR QL STRIP.AUTO: (no result)
BUN SERPL-MCNC: 26 MG/DL (ref 9–20)
BUN/CREAT SERPL: 24
CHLORIDE SERPL-SCNC: 96 MMOL/L (ref 95–108)
CO2 SERPL-SCNC: 24 MMOL/L (ref 22–30)
COLOR UR AUTO: YELLOW
CREAT SERPL-MCNC: 1.1 MG/DL (ref 0.7–1.3)
EOSINOPHIL NFR BLD AUTO: 1 % (ref 0–8)
EPI CELLS URNS QL MICRO: (no result) EPI/HPF
ERYTHROCYTE [DISTWIDTH] IN BLOOD BY AUTOMATED COUNT: 14.1 % (ref 11.5–15.5)
GLUCOSE UR STRIP.AUTO-MCNC: >=1000 MG/DL
HCT VFR BLD AUTO: 42.5 % (ref 39–50)
HGB BLD-MCNC: 14 G/DL (ref 14–18)
HGB UR QL STRIP.AUTO: (no result)
IMM GRANULOCYTES NFR BLD: 0.9 % (ref 0–5)
KETONES UR STRIP.AUTO-MCNC: (no result) MG/DL
LEUKOCYTE ESTERASE UR QL STRIP.AUTO: (no result)
LIPASE SERPL-CCNC: 25 U/L (ref 23–300)
LYMPHOCYTES NFR BLD: 19 % (ref 15–41)
MCH RBC QN AUTO: 25.9 PG  CALC (ref 26–32)
MCHC RBC AUTO-ENTMCNC: 32.9 G/DL CAL (ref 32–36)
MCV RBC AUTO: 78.7 FL  CALC (ref 80–100)
MONOCYTES NFR BLD AUTO: 13 % (ref 2–13)
NEUTROPHILS # BLD AUTO: 3.77 THOU/UL (ref 1.82–7.42)
NEUTROPHILS NFR BLD AUTO: 67 % (ref 42–76)
NITRITE UR QL STRIP.AUTO: NEGATIVE
PH UR STRIP.AUTO: 5.5 [PH] (ref 4.5–8)
PLATELET # BLD AUTO: 177 THOU/UL (ref 130–400)
POTASSIUM SERPL-SCNC: 5 MMOL/L (ref 3.5–5.1)
PROT SERPL-MCNC: 7.1 G/DL (ref 6.3–8.2)
PROT UR QL STRIP.AUTO: >=300 MG/DL
RBC # BLD AUTO: 5.4 MILL/UL (ref 4.7–6.1)
SERVICE CMNT 15-IMP: (no result) HPF
SODIUM SERPL-SCNC: 128 MMOL/L (ref 137–146)
SP GR UR STRIP.AUTO: 1.02
UROBILINOGEN UR QL STRIP.AUTO: 1 E.U./DL
WBC #/AREA URNS HPF: (no result) WBC/HPF (ref 0–5)

## 2020-07-02 ENCOUNTER — HOSPITAL ENCOUNTER (EMERGENCY)
Dept: HOSPITAL 82 - ED | Age: 60
LOS: 1 days | Discharge: HOME | End: 2020-07-03
Payer: MEDICARE

## 2020-07-02 VITALS — WEIGHT: 217.38 LBS | BODY MASS INDEX: 30.43 KG/M2 | HEIGHT: 71 IN

## 2020-07-02 DIAGNOSIS — E11.9: ICD-10-CM

## 2020-07-02 DIAGNOSIS — Z86.73: ICD-10-CM

## 2020-07-02 DIAGNOSIS — I10: ICD-10-CM

## 2020-07-02 DIAGNOSIS — Z86.19: ICD-10-CM

## 2020-07-02 DIAGNOSIS — Z95.5: ICD-10-CM

## 2020-07-02 DIAGNOSIS — I48.92: ICD-10-CM

## 2020-07-02 DIAGNOSIS — I48.91: ICD-10-CM

## 2020-07-02 DIAGNOSIS — Z95.2: ICD-10-CM

## 2020-07-02 DIAGNOSIS — Z79.84: ICD-10-CM

## 2020-07-02 DIAGNOSIS — N45.1: Primary | ICD-10-CM

## 2020-07-02 LAB
ANION GAP SERPL CALCULATED.3IONS-SCNC: 13 MMOL/L
BASOPHILS NFR BLD AUTO: 1 % (ref 0–3)
BILIRUB UR QL STRIP.AUTO: NEGATIVE
BUN SERPL-MCNC: 31 MG/DL (ref 9–20)
BUN/CREAT SERPL: 34
CHLORIDE SERPL-SCNC: 100 MMOL/L (ref 95–108)
CO2 SERPL-SCNC: 22 MMOL/L (ref 22–30)
COLOR UR AUTO: YELLOW
CREAT SERPL-MCNC: 0.9 MG/DL (ref 0.7–1.3)
EOSINOPHIL NFR BLD AUTO: 1 % (ref 0–8)
ERYTHROCYTE [DISTWIDTH] IN BLOOD BY AUTOMATED COUNT: 14 % (ref 11.5–15.5)
GLUCOSE UR STRIP.AUTO-MCNC: >=1000 MG/DL
HCT VFR BLD AUTO: 40.1 % (ref 39–50)
HGB BLD-MCNC: 13 G/DL (ref 14–18)
HGB UR QL STRIP.AUTO: (no result)
IMM GRANULOCYTES NFR BLD: 1.6 % (ref 0–5)
KETONES UR STRIP.AUTO-MCNC: NEGATIVE MG/DL
LEUKOCYTE ESTERASE UR QL STRIP.AUTO: NEGATIVE
LYMPHOCYTES NFR BLD: 23 % (ref 15–41)
MCH RBC QN AUTO: 25.7 PG  CALC (ref 26–32)
MCHC RBC AUTO-ENTMCNC: 32.4 G/DL CAL (ref 32–36)
MCV RBC AUTO: 79.2 FL  CALC (ref 80–100)
MONOCYTES NFR BLD AUTO: 10 % (ref 2–13)
NEUTROPHILS # BLD AUTO: 3.3 THOU/UL (ref 1.82–7.42)
NEUTROPHILS NFR BLD AUTO: 64 % (ref 42–76)
NITRITE UR QL STRIP.AUTO: NEGATIVE
PH UR STRIP.AUTO: 5.5 [PH] (ref 4.5–8)
PLATELET # BLD AUTO: 189 THOU/UL (ref 130–400)
POTASSIUM SERPL-SCNC: 3.8 MMOL/L (ref 3.5–5.1)
PROT UR QL STRIP.AUTO: 100 MG/DL
RBC # BLD AUTO: 5.06 MILL/UL (ref 4.7–6.1)
RBC #/AREA URNS HPF: (no result) RBC/HPF (ref 0–5)
SODIUM SERPL-SCNC: 131 MMOL/L (ref 137–146)
SP GR UR STRIP.AUTO: 1.02
SQUAMOUS URNS QL MICRO: (no result) EPI/HPF
UROBILINOGEN UR QL STRIP.AUTO: 0.2 E.U./DL

## 2020-07-03 VITALS — DIASTOLIC BLOOD PRESSURE: 65 MMHG | SYSTOLIC BLOOD PRESSURE: 117 MMHG

## 2020-07-25 ENCOUNTER — HOSPITAL ENCOUNTER (OUTPATIENT)
Dept: HOSPITAL 82 - ED | Age: 60
Setting detail: OBSERVATION
LOS: 4 days | Discharge: PSYCHIATRIC OR SUBSTANCE USE FACILITY | End: 2020-07-29
Attending: INTERNAL MEDICINE | Admitting: INTERNAL MEDICINE
Payer: MEDICARE

## 2020-07-25 VITALS — WEIGHT: 229 LBS | HEIGHT: 71 IN | BODY MASS INDEX: 32.06 KG/M2

## 2020-07-25 VITALS — SYSTOLIC BLOOD PRESSURE: 94 MMHG | DIASTOLIC BLOOD PRESSURE: 53 MMHG

## 2020-07-25 VITALS — SYSTOLIC BLOOD PRESSURE: 109 MMHG | DIASTOLIC BLOOD PRESSURE: 68 MMHG

## 2020-07-25 DIAGNOSIS — E11.65: ICD-10-CM

## 2020-07-25 DIAGNOSIS — I48.92: ICD-10-CM

## 2020-07-25 DIAGNOSIS — E78.5: ICD-10-CM

## 2020-07-25 DIAGNOSIS — I48.91: ICD-10-CM

## 2020-07-25 DIAGNOSIS — E87.5: ICD-10-CM

## 2020-07-25 DIAGNOSIS — K21.9: ICD-10-CM

## 2020-07-25 DIAGNOSIS — Z95.2: ICD-10-CM

## 2020-07-25 DIAGNOSIS — Z92.3: ICD-10-CM

## 2020-07-25 DIAGNOSIS — I10: ICD-10-CM

## 2020-07-25 DIAGNOSIS — Z79.4: ICD-10-CM

## 2020-07-25 DIAGNOSIS — Z86.73: ICD-10-CM

## 2020-07-25 DIAGNOSIS — Z92.21: ICD-10-CM

## 2020-07-25 DIAGNOSIS — Z95.1: ICD-10-CM

## 2020-07-25 DIAGNOSIS — Z95.5: ICD-10-CM

## 2020-07-25 DIAGNOSIS — Z85.819: ICD-10-CM

## 2020-07-25 DIAGNOSIS — I25.118: ICD-10-CM

## 2020-07-25 DIAGNOSIS — K59.00: ICD-10-CM

## 2020-07-25 DIAGNOSIS — U07.1: ICD-10-CM

## 2020-07-25 DIAGNOSIS — R07.9: Primary | ICD-10-CM

## 2020-07-25 DIAGNOSIS — N40.0: ICD-10-CM

## 2020-07-25 DIAGNOSIS — Z87.891: ICD-10-CM

## 2020-07-25 DIAGNOSIS — E87.1: ICD-10-CM

## 2020-07-25 LAB
ALBUMIN SERPL-MCNC: 3.6 G/DL (ref 3.2–5)
ALP SERPL-CCNC: 135 U/L (ref 38–126)
AMYLASE SERPL-CCNC: 47 U/L (ref 30–110)
ANION GAP SERPL CALCULATED.3IONS-SCNC: 14 MMOL/L
APTT PPP: 25 SECONDS (ref 20–32.5)
AST SERPL-CCNC: 22 U/L (ref 17–59)
BASOPHILS NFR BLD AUTO: 1 % (ref 0–3)
BUN SERPL-MCNC: 35 MG/DL (ref 9–20)
BUN/CREAT SERPL: 34
CHLORIDE SERPL-SCNC: 94 MMOL/L (ref 95–108)
CO2 SERPL-SCNC: 24 MMOL/L (ref 22–30)
CREAT SERPL-MCNC: 1 MG/DL (ref 0.7–1.3)
EOSINOPHIL NFR BLD AUTO: 1 % (ref 0–8)
ERYTHROCYTE [DISTWIDTH] IN BLOOD BY AUTOMATED COUNT: 13.9 % (ref 11.5–15.5)
HCT VFR BLD AUTO: 41.2 % (ref 39–50)
HGB BLD-MCNC: 13.3 G/DL (ref 14–18)
IMM GRANULOCYTES NFR BLD: 1.2 % (ref 0–5)
INR PPP: 0.9 RATIO (ref 0.7–1.3)
LIPASE SERPL-CCNC: 69 U/L (ref 23–300)
LYMPHOCYTES NFR BLD: 23 % (ref 15–41)
MCH RBC QN AUTO: 25.5 PG  CALC (ref 26–32)
MCHC RBC AUTO-ENTMCNC: 32.3 G/DL CAL (ref 32–36)
MCV RBC AUTO: 78.9 FL  CALC (ref 80–100)
MONOCYTES NFR BLD AUTO: 11 % (ref 2–13)
NEUTROPHILS # BLD AUTO: 2.61 THOU/UL (ref 1.82–7.42)
NEUTROPHILS NFR BLD AUTO: 64 % (ref 42–76)
PLATELET # BLD AUTO: 154 THOU/UL (ref 130–400)
POTASSIUM SERPL-SCNC: 6 MMOL/L (ref 3.5–5.1)
PROT SERPL-MCNC: 6.6 G/DL (ref 6.3–8.2)
PROTHROMBIN TIME: 9.2 SECONDS (ref 9–12.5)
RBC # BLD AUTO: 5.22 MILL/UL (ref 4.7–6.1)
SODIUM SERPL-SCNC: 126 MMOL/L (ref 137–146)

## 2020-07-25 PROCEDURE — G0378 HOSPITAL OBSERVATION PER HR: HCPCS

## 2020-07-25 NOTE — NUR
January 17, 2018      [facility]  [address] [place] [address]  Phone: [phone]  Fax: [phone]       Patient: Nelson Melendrez   YOB: 1997  Date of Visit: 01/17/2018    To Whom It May Concern:    Disha Melendrez  was at [facility] on 01/17/2018. He may return to work/school on 01/22/2018 with no restrictions. If you have any questions or concerns, or if I can be of further assistance, please do not hesitate to contact me.    Sincerely,    Erin Corona MD      PT MEDICATED PER MAR FOR CHEST PAIN RATING 8 OUT OF 10; MONITORING DEVICES IN
PLACE; VSS; WILL CONTINUE TO MONITOR

## 2020-07-25 NOTE — NUR
PER ER DOCTOR THE PATIENT WAS GIVEN INSULIN FOR HIS POTASSIUM.  REPORT CALLED
TO THE FLOOR AND GIVEN TO CHEMO.

## 2020-07-25 NOTE — NUR
PT. ARRIVED TO THE FLOOR @2120 VIA W/C ACCOMPANIED BY ER NURSEPAMELLA.
ORIENTED TO ROOM, CALL LIGHT, AND POC. VSS. NO RESP. DISTRESS NOTED. PT.
DENIES ANY CP. NITRO PASTE TO LEFT ARM AND C/O HA; MEDICATED WITH ORDERED PRN
ULTRAM; EDUCATION GIVEN ON FREQUENCY OF PAIN MEDS AND VERBALIZES
UNDERSTANDING. PO FLUIDS OFFERED. IV SITE PATENT AND SL, IV SITE FLUSHED WITH
NS. PT. REPORTS HE HAS BEEN TAKING HOME DIABETIC MEDS AS THEY ARE ORDERED, BUT
BS STILL HAVE BEEN IN -400'S AT HOME. ENCOURAGED TO CALL FOR ANY
NEEDS. CALL LIGHT IS IN REACH.

## 2020-07-26 VITALS — DIASTOLIC BLOOD PRESSURE: 49 MMHG | SYSTOLIC BLOOD PRESSURE: 95 MMHG

## 2020-07-26 VITALS — DIASTOLIC BLOOD PRESSURE: 61 MMHG | SYSTOLIC BLOOD PRESSURE: 107 MMHG

## 2020-07-26 VITALS — DIASTOLIC BLOOD PRESSURE: 72 MMHG | SYSTOLIC BLOOD PRESSURE: 137 MMHG

## 2020-07-26 VITALS — DIASTOLIC BLOOD PRESSURE: 72 MMHG | SYSTOLIC BLOOD PRESSURE: 152 MMHG

## 2020-07-26 VITALS — SYSTOLIC BLOOD PRESSURE: 94 MMHG | DIASTOLIC BLOOD PRESSURE: 53 MMHG

## 2020-07-26 LAB
ALBUMIN SERPL-MCNC: 3.1 G/DL (ref 3.2–5)
ALP SERPL-CCNC: 99 U/L (ref 38–126)
ANION GAP SERPL CALCULATED.3IONS-SCNC: 10 MMOL/L
AST SERPL-CCNC: 15 U/L (ref 17–59)
BASOPHILS NFR BLD AUTO: 1 % (ref 0–3)
BUN SERPL-MCNC: 34 MG/DL (ref 9–20)
BUN/CREAT SERPL: 32
CHLORIDE SERPL-SCNC: 98 MMOL/L (ref 95–108)
CO2 SERPL-SCNC: 26 MMOL/L (ref 22–30)
CREAT SERPL-MCNC: 1.1 MG/DL (ref 0.7–1.3)
EOSINOPHIL NFR BLD AUTO: 1 % (ref 0–8)
ERYTHROCYTE [DISTWIDTH] IN BLOOD BY AUTOMATED COUNT: 14.2 % (ref 11.5–15.5)
HCT VFR BLD AUTO: 38.5 % (ref 39–50)
HGB BLD-MCNC: 11.9 G/DL (ref 14–18)
IMM GRANULOCYTES NFR BLD: 1.8 % (ref 0–5)
LYMPHOCYTES NFR BLD: 27 % (ref 15–41)
MCH RBC QN AUTO: 25.1 PG  CALC (ref 26–32)
MCHC RBC AUTO-ENTMCNC: 30.9 G/DL CAL (ref 32–36)
MCV RBC AUTO: 81.1 FL  CALC (ref 80–100)
MONOCYTES NFR BLD AUTO: 13 % (ref 2–13)
NEUTROPHILS # BLD AUTO: 2.19 THOU/UL (ref 1.82–7.42)
NEUTROPHILS NFR BLD AUTO: 56 % (ref 42–76)
PLATELET # BLD AUTO: 141 THOU/UL (ref 130–400)
POTASSIUM SERPL-SCNC: 5 MMOL/L (ref 3.5–5.1)
PROT SERPL-MCNC: 6 G/DL (ref 6.3–8.2)
RBC # BLD AUTO: 4.75 MILL/UL (ref 4.7–6.1)
SODIUM SERPL-SCNC: 129 MMOL/L (ref 137–146)

## 2020-07-26 NOTE — NUR
PT. REPORTING A HA AND MEDICATED WITH ORDERED PRN TYLENOL; REMOVED NITRO PASTE
AS PER PT'S REQUEST R/T HA. COLD PACK APPLIED TO NECK; WILL REASSESS. DIET
COKE PROVIDED. ENCOURAGED TO CALL FOR ANY NEEDS.

## 2020-07-26 NOTE — NUR
PT PROVIDED TYLENOL FOR HEADACHE THIS MORNING.  PT TAKEN TO RADIOLOGY FOR KUB.
INSULIN PROVIDED WHEN PT FELT LESS NAUSEATED AND SAID THAT HE WOULD BE EATING
HIS NOON MEAL.  NO ACUTE DISTRESS NOTED.

## 2020-07-26 NOTE — NUR
PT IS AWAKE, ALERT, ORIENTED X 3.  LUNGS CLEAR, RA.  NO COMPLAINTS OR DISTRESS
NOTED AT THIS TIME.  PT DOES STATE THAT HE FEELS WEAK TODAY.  PT DID NOT EAT
BREAKFAST THIS MORNING AS PER NAUSEA, LEVEMIR HELD AT THIS TIME.

## 2020-07-26 NOTE — NUR
ASSESSMENT COMPLETED. PT. C/O HA/MIGRAINE AND MEDICATED WITH ORDERED PRN
ULTRAM ALONG WITH SCHED MEDS AND MOM TO ASSIST WITH BM. SNACK PROVIDED. IV
SITE DUE TO BE CHANGED, NEW IV STARTED TO RWRIST X1 ATTEMPT AND EMS SITE
REMOVED WITH CATHETER TIP INTACT. PT. TOLERATED WELL. DENIES CP. ENCOURAGED TO
CALL FOR ANY NEEDS.

## 2020-07-27 VITALS — SYSTOLIC BLOOD PRESSURE: 134 MMHG | DIASTOLIC BLOOD PRESSURE: 61 MMHG

## 2020-07-27 VITALS — SYSTOLIC BLOOD PRESSURE: 114 MMHG | DIASTOLIC BLOOD PRESSURE: 54 MMHG

## 2020-07-27 VITALS — SYSTOLIC BLOOD PRESSURE: 140 MMHG | DIASTOLIC BLOOD PRESSURE: 71 MMHG

## 2020-07-27 VITALS — DIASTOLIC BLOOD PRESSURE: 68 MMHG | SYSTOLIC BLOOD PRESSURE: 136 MMHG

## 2020-07-27 VITALS — SYSTOLIC BLOOD PRESSURE: 97 MMHG | DIASTOLIC BLOOD PRESSURE: 63 MMHG

## 2020-07-27 VITALS — SYSTOLIC BLOOD PRESSURE: 139 MMHG | DIASTOLIC BLOOD PRESSURE: 74 MMHG

## 2020-07-27 VITALS — SYSTOLIC BLOOD PRESSURE: 115 MMHG | DIASTOLIC BLOOD PRESSURE: 53 MMHG

## 2020-07-27 VITALS — DIASTOLIC BLOOD PRESSURE: 72 MMHG | SYSTOLIC BLOOD PRESSURE: 126 MMHG

## 2020-07-27 LAB
ANION GAP SERPL CALCULATED.3IONS-SCNC: 10 MMOL/L
BUN SERPL-MCNC: 31 MG/DL (ref 9–20)
BUN/CREAT SERPL: 31
CHLORIDE SERPL-SCNC: 99 MMOL/L (ref 95–108)
CHOLEST SERPL-MCNC: 208 MG/DL (ref 0–199)
CHOLEST/HDLC SERPL: 5.9 {RATIO}
CO2 SERPL-SCNC: 26 MMOL/L (ref 22–30)
CREAT SERPL-MCNC: 1 MG/DL (ref 0.7–1.3)
ERYTHROCYTE [DISTWIDTH] IN BLOOD BY AUTOMATED COUNT: 14 % (ref 11.5–15.5)
HCT VFR BLD AUTO: 40.6 % (ref 39–50)
HDLC SERPL-MCNC: 35 MG/DL (ref 40–?)
HGB BLD-MCNC: 12.7 G/DL (ref 14–18)
LDLC SERPL CALC-MCNC: 119 MG/DL
MCH RBC QN AUTO: 25.1 PG  CALC (ref 26–32)
MCHC RBC AUTO-ENTMCNC: 31.3 G/DL CAL (ref 32–36)
MCV RBC AUTO: 80.4 FL  CALC (ref 80–100)
PLATELET # BLD AUTO: 151 THOU/UL (ref 130–400)
POTASSIUM SERPL-SCNC: 4.7 MMOL/L (ref 3.5–5.1)
RBC # BLD AUTO: 5.05 MILL/UL (ref 4.7–6.1)
SODIUM SERPL-SCNC: 130 MMOL/L (ref 137–146)
TRIGL SERPL-MCNC: 268 MG/DL (ref 30–149)
VLDLC SERPL CALC-MCNC: 54 MG/DL

## 2020-07-27 NOTE — NUR
PT. RESTING IN BED ON RIGHT SIDE WITH EYES CLOSED AWAKENED FOR VS; VSS; DENIES
NEEDS AND VOICES NO CONCERNS. CALL LIGHT IS IN REACH. WILL CONTINUE TO
MONITOR.

## 2020-07-27 NOTE — NUR
ASSESSMENT COMPLETED. NO DISTRESS NOTED; DENIES CP AND C/O HA AND NAUSEA AND
REPORTS STILL NO BM; PRN MOM, ZOFRAN, AND ULTRAM GIVEN; WILL REASSESS. UPDATED
ON POC AND INSTRUCTED PT. TO TRANSITION INTO DIFFERENT POSITIONS SLOWLY WHEN
GETTING UP AND IF HE FEELS DIZZY OR WEAK TO CALL FOR ASSISTANCE. IV SITE
PATENT AND SL. ENCOURAGED TO CALL FOR ANY NEEDS. SNACK PROVIDED. RE-EDUCATED
ON FLUIDS RESTRICTION AS WELL; VERBALIZES UNDERSTANDING.CALL LIGHT IS IN
REACH.

## 2020-07-27 NOTE — NUR
PT. C/O HA AND MEDICATED WITH ORDERED PRN ULTRAM; WILL REASSESS. FRESH WATER
PROVIDED. CALL LIGHT IS IN REACH.

## 2020-07-27 NOTE — NUR
PT IS RELAXING, NO DISTRESS NOTED. IV SITE IS FREE FROM REDNESS OR EDEMA.
CONTINUE TO OSBERVE AND MONITOER.

## 2020-07-27 NOTE — NUR
ASSESSMENT IS COMPLTED: IV SITE IS FREE FROM REDNESS OR EDMEA.HR IS REG,PULSES
ARE STRONG X4M, ABD IS SOFT WITH ACTIVE BS, BREATH SOUNDS ARE
CLEAR,BILATERALLY, NO C/O SOB. TELE MONITOR IN PLACE.

## 2020-07-27 NOTE — NUR
PT WAS C/O CHEST PAIN AND PRESSURE. DUSTING ARPN AWARE ,EKG WAS COMPLETED AND
GIVEN NATHANS MAGIC MOUTHWASH. CONTINU EOT OBSERVE AND MONITOR.

## 2020-07-27 NOTE — NUR
Patient is screened for rehab intervention and it is felt that no intervention
is needed at this time`

## 2020-07-28 VITALS — SYSTOLIC BLOOD PRESSURE: 133 MMHG | DIASTOLIC BLOOD PRESSURE: 67 MMHG

## 2020-07-28 VITALS — SYSTOLIC BLOOD PRESSURE: 98 MMHG | DIASTOLIC BLOOD PRESSURE: 54 MMHG

## 2020-07-28 VITALS — SYSTOLIC BLOOD PRESSURE: 132 MMHG | DIASTOLIC BLOOD PRESSURE: 57 MMHG

## 2020-07-28 VITALS — SYSTOLIC BLOOD PRESSURE: 144 MMHG | DIASTOLIC BLOOD PRESSURE: 79 MMHG

## 2020-07-28 VITALS — SYSTOLIC BLOOD PRESSURE: 135 MMHG | DIASTOLIC BLOOD PRESSURE: 71 MMHG

## 2020-07-28 VITALS — DIASTOLIC BLOOD PRESSURE: 42 MMHG | SYSTOLIC BLOOD PRESSURE: 84 MMHG

## 2020-07-28 VITALS — DIASTOLIC BLOOD PRESSURE: 54 MMHG | SYSTOLIC BLOOD PRESSURE: 119 MMHG

## 2020-07-28 LAB
ANION GAP SERPL CALCULATED.3IONS-SCNC: 11 MMOL/L
BUN SERPL-MCNC: 31 MG/DL (ref 9–20)
BUN/CREAT SERPL: 24
CHLORIDE SERPL-SCNC: 97 MMOL/L (ref 95–108)
CO2 SERPL-SCNC: 26 MMOL/L (ref 22–30)
CREAT SERPL-MCNC: 1.3 MG/DL (ref 0.7–1.3)
ERYTHROCYTE [DISTWIDTH] IN BLOOD BY AUTOMATED COUNT: 14.1 % (ref 11.5–15.5)
HCT VFR BLD AUTO: 40.5 % (ref 39–50)
HGB BLD-MCNC: 12.9 G/DL (ref 14–18)
MAGNESIUM SERPL-MCNC: 2.6 MG/DL (ref 1.6–2.3)
MCH RBC QN AUTO: 25.7 PG  CALC (ref 26–32)
MCHC RBC AUTO-ENTMCNC: 31.9 G/DL CAL (ref 32–36)
MCV RBC AUTO: 80.8 FL  CALC (ref 80–100)
PLATELET # BLD AUTO: 152 THOU/UL (ref 130–400)
POTASSIUM SERPL-SCNC: 4.7 MMOL/L (ref 3.5–5.1)
RBC # BLD AUTO: 5.01 MILL/UL (ref 4.7–6.1)
SODIUM SERPL-SCNC: 129 MMOL/L (ref 137–146)

## 2020-07-28 NOTE — NUR
PT. RESTING IN BED WITH NO DISTRESS NOTED, LAUGHING AND SMILING. REPORTING HA
AND ABD PAIN; MEDICATED WITH ORDERED PRN TYLENOL; WILL REASSESS. ASSESSMENT
COMPLETED. PT. STILL REPORTING NO BM. ENCOURAGED TO CALL FOR ANY NEEDS. CALL
LIGHT IS IN REACH. WILL CONTINUE TO MONITOR.

## 2020-07-28 NOTE — NUR
ASSESSMENT IS COMPLETED: IV SITE IS FREE FROM REDNESS OR EDEMA. HR IS
REG,PULSES ARE STRONG X4, ABD IS SOFT WITH ACTIVE BS, BREATH SOUNDS ARE
CLEAR. BILATERALLY., NO C/O SOB. TELE MONITOR IN PLACE.

## 2020-07-28 NOTE — NUR
PT. C/O ABD PAIN AND STILL NO BM; MEDICATED WITH ORDERED PRN MOM AND TYLENOL;
WILL REASSESS. ENCOURAGED TO AMBULATE IN ROOM TO ASSIST WITH GAS PAIN.

## 2020-07-28 NOTE — NUR
PT. C/O HA; NO CP. MEDICATED WITH ORDERED PRN ULTRAM; WILL REASSESS. PT.
REPORTS STILL NO BM POST MOM; WILL RELAY TO DAYSHIFT TO ASK MD FOR FURTHER
ORDERS TO ASSIST WITH BM.

## 2020-07-28 NOTE — NUR
PT HAS BEEN LAYING IN BED ENCOURAGED TO WALK IN THE ROOM. TO INITIATE THE BM
PROCESS. IV SITE IS FREE FROM REDNESS OR EDEMA.

## 2020-07-28 NOTE — NUR
PT IS RELAXING IN BED WITH NO DISTRESS NOTED. IV SITE IS FREE FROM REDNESS OR
EDEMA. CONTINUE TO OSBERVE AND MONITOR.

## 2020-07-28 NOTE — NUR
SPOKE WITH DR. ANDRADE AND NOTIFIED HIM OF PT. REPORTING GENERALIZED ITCHING;
NEW ORDERS RECEIEVED AND TO BE CARRIED OUT.

## 2020-07-28 NOTE — NUR
DR. GARDINER NOTIFIED OF THIS BEING DAY 5 WITHOUT A BM WITH MOM GIVEN LAST NIGHT
AND THIS AM AS WELL AS THE NIGHT PREVIOUS WITHOUT RESULTS. ALSO NOTIFIED HIM
OF KUB RESULTS ON THIS ADMISSION. NEW ORDERS RECEIVED AND TO BE CARRIED OUT.

## 2020-07-28 NOTE — NUR
PT. C/O ITCHING AND MEDICATE WITH ORDERED PRN HYDROXIZINE; WILL
REASSESS;CHECKED SKIN AND NO HIVES NOTED.  CALL LIGHT IS IN REACH.

## 2020-07-29 VITALS — SYSTOLIC BLOOD PRESSURE: 147 MMHG | DIASTOLIC BLOOD PRESSURE: 63 MMHG

## 2020-07-29 VITALS — SYSTOLIC BLOOD PRESSURE: 141 MMHG | DIASTOLIC BLOOD PRESSURE: 74 MMHG

## 2020-07-29 VITALS — DIASTOLIC BLOOD PRESSURE: 74 MMHG | SYSTOLIC BLOOD PRESSURE: 141 MMHG

## 2020-07-29 LAB
ANION GAP SERPL CALCULATED.3IONS-SCNC: 11 MMOL/L
BASOPHILS NFR BLD AUTO: 1 % (ref 0–3)
BUN SERPL-MCNC: 28 MG/DL (ref 9–20)
BUN/CREAT SERPL: 26
CHLORIDE SERPL-SCNC: 98 MMOL/L (ref 95–108)
CO2 SERPL-SCNC: 25 MMOL/L (ref 22–30)
CREAT SERPL-MCNC: 1.1 MG/DL (ref 0.7–1.3)
EOSINOPHIL NFR BLD AUTO: 1 % (ref 0–8)
ERYTHROCYTE [DISTWIDTH] IN BLOOD BY AUTOMATED COUNT: 13.8 % (ref 11.5–15.5)
HCT VFR BLD AUTO: 42.4 % (ref 39–50)
HGB BLD-MCNC: 13.1 G/DL (ref 14–18)
IMM GRANULOCYTES NFR BLD: 1.3 % (ref 0–5)
LYMPHOCYTES NFR BLD: 29 % (ref 15–41)
MCH RBC QN AUTO: 25 PG  CALC (ref 26–32)
MCHC RBC AUTO-ENTMCNC: 30.9 G/DL CAL (ref 32–36)
MCV RBC AUTO: 81.1 FL  CALC (ref 80–100)
MONOCYTES NFR BLD AUTO: 15 % (ref 2–13)
NEUTROPHILS # BLD AUTO: 2.12 THOU/UL (ref 1.82–7.42)
NEUTROPHILS NFR BLD AUTO: 54 % (ref 42–76)
PLATELET # BLD AUTO: 149 THOU/UL (ref 130–400)
POTASSIUM SERPL-SCNC: 4.4 MMOL/L (ref 3.5–5.1)
RBC # BLD AUTO: 5.23 MILL/UL (ref 4.7–6.1)
SODIUM SERPL-SCNC: 130 MMOL/L (ref 137–146)

## 2020-07-29 NOTE — NUR
Discharge instructions given. Patient verbalizes understanding of same.
Discharged in stable condition via Wheelchair to Home with [facility]
staff. All belongings sent with pt.

## 2020-07-29 NOTE — NUR
PT SITTING IN BED, A&O X3. NO DISTRESS NOTED. PT REPORTS TO FEEL READY TO GO
HOME. SLIGHT TRACE BILAT ANKLE EDEMA NOTED. NO OTHER NEEDS AT THIS TIME.
ASSESSMENT COMPLETED. DISCUSSED POC. CALL LIGHT IN REACH. CONTINUE TO MONITOR.

## 2020-08-16 ENCOUNTER — HOSPITAL ENCOUNTER (INPATIENT)
Dept: HOSPITAL 82 - ED | Age: 60
LOS: 3 days | Discharge: HOME HEALTH | DRG: 292 | End: 2020-08-19
Attending: INTERNAL MEDICINE | Admitting: INTERNAL MEDICINE
Payer: MEDICARE

## 2020-08-16 VITALS — SYSTOLIC BLOOD PRESSURE: 98 MMHG | DIASTOLIC BLOOD PRESSURE: 62 MMHG

## 2020-08-16 VITALS — HEIGHT: 71 IN | BODY MASS INDEX: 31.26 KG/M2 | WEIGHT: 223.31 LBS

## 2020-08-16 VITALS — DIASTOLIC BLOOD PRESSURE: 62 MMHG | SYSTOLIC BLOOD PRESSURE: 113 MMHG

## 2020-08-16 VITALS — SYSTOLIC BLOOD PRESSURE: 136 MMHG | DIASTOLIC BLOOD PRESSURE: 76 MMHG

## 2020-08-16 VITALS — SYSTOLIC BLOOD PRESSURE: 113 MMHG | DIASTOLIC BLOOD PRESSURE: 68 MMHG

## 2020-08-16 DIAGNOSIS — Z95.5: ICD-10-CM

## 2020-08-16 DIAGNOSIS — E78.5: ICD-10-CM

## 2020-08-16 DIAGNOSIS — R79.89: ICD-10-CM

## 2020-08-16 DIAGNOSIS — I50.23: ICD-10-CM

## 2020-08-16 DIAGNOSIS — Z86.19: ICD-10-CM

## 2020-08-16 DIAGNOSIS — I11.0: Primary | ICD-10-CM

## 2020-08-16 DIAGNOSIS — Z79.4: ICD-10-CM

## 2020-08-16 DIAGNOSIS — K21.9: ICD-10-CM

## 2020-08-16 DIAGNOSIS — Z92.3: ICD-10-CM

## 2020-08-16 DIAGNOSIS — Z95.3: ICD-10-CM

## 2020-08-16 DIAGNOSIS — I25.118: ICD-10-CM

## 2020-08-16 DIAGNOSIS — Z86.73: ICD-10-CM

## 2020-08-16 DIAGNOSIS — Z92.21: ICD-10-CM

## 2020-08-16 DIAGNOSIS — R07.9: ICD-10-CM

## 2020-08-16 DIAGNOSIS — E86.0: ICD-10-CM

## 2020-08-16 DIAGNOSIS — Z95.1: ICD-10-CM

## 2020-08-16 DIAGNOSIS — Z87.891: ICD-10-CM

## 2020-08-16 DIAGNOSIS — E11.65: ICD-10-CM

## 2020-08-16 DIAGNOSIS — Z85.819: ICD-10-CM

## 2020-08-16 DIAGNOSIS — I48.92: ICD-10-CM

## 2020-08-16 DIAGNOSIS — R94.31: ICD-10-CM

## 2020-08-16 LAB
ALBUMIN SERPL-MCNC: 4.3 G/DL (ref 3.2–5)
ALP SERPL-CCNC: 162 U/L (ref 38–126)
ANION GAP SERPL CALCULATED.3IONS-SCNC: 18 MMOL/L
AST SERPL-CCNC: 17 U/L (ref 17–59)
BACTERIA #/AREA URNS HPF: (no result) HPF
BASOPHILS NFR BLD AUTO: 1 % (ref 0–3)
BILIRUB UR QL STRIP.AUTO: NEGATIVE
BUN SERPL-MCNC: 52 MG/DL (ref 9–20)
BUN/CREAT SERPL: 37
CHLORIDE SERPL-SCNC: 95 MMOL/L (ref 95–108)
CO2 SERPL-SCNC: 21 MMOL/L (ref 22–30)
COLOR UR AUTO: YELLOW
CREAT SERPL-MCNC: 1.4 MG/DL (ref 0.7–1.3)
EOSINOPHIL NFR BLD AUTO: 1 % (ref 0–8)
ERYTHROCYTE [DISTWIDTH] IN BLOOD BY AUTOMATED COUNT: 13.7 % (ref 11.5–15.5)
GLUCOSE UR STRIP.AUTO-MCNC: 500 MG/DL
HCT VFR BLD AUTO: 44.6 % (ref 39–50)
HGB BLD-MCNC: 14.6 G/DL (ref 14–18)
HGB UR QL STRIP.AUTO: NEGATIVE
IMM GRANULOCYTES NFR BLD: 1.6 % (ref 0–5)
KETONES UR STRIP.AUTO-MCNC: (no result) MG/DL
LEUKOCYTE ESTERASE UR QL STRIP.AUTO: NEGATIVE
LYMPHOCYTES NFR BLD: 21 % (ref 15–41)
MCH RBC QN AUTO: 25.1 PG  CALC (ref 26–32)
MCHC RBC AUTO-ENTMCNC: 32.7 G/DL CAL (ref 32–36)
MCV RBC AUTO: 76.8 FL  CALC (ref 80–100)
MONOCYTES NFR BLD AUTO: 10 % (ref 2–13)
MYOGLOBIN SERPL-MCNC: 68 NG/ML (ref 0–121)
NEUTROPHILS # BLD AUTO: 4.34 THOU/UL (ref 1.82–7.42)
NEUTROPHILS NFR BLD AUTO: 65 % (ref 42–76)
NITRITE UR QL STRIP.AUTO: NEGATIVE
PH UR STRIP.AUTO: 5 [PH] (ref 4.5–8)
PLATELET # BLD AUTO: 216 THOU/UL (ref 130–400)
POTASSIUM SERPL-SCNC: 5.3 MMOL/L (ref 3.5–5.1)
PROT SERPL-MCNC: 7.9 G/DL (ref 6.3–8.2)
PROT UR QL STRIP.AUTO: 100 MG/DL
RBC # BLD AUTO: 5.81 MILL/UL (ref 4.7–6.1)
RBC #/AREA URNS HPF: (no result) RBC/HPF (ref 0–5)
SERVICE CMNT 15-IMP: (no result) HPF
SODIUM SERPL-SCNC: 129 MMOL/L (ref 137–146)
SP GR UR STRIP.AUTO: 1.02
SQUAMOUS URNS QL MICRO: (no result) EPI/HPF
UROBILINOGEN UR QL STRIP.AUTO: 0.2 E.U./DL
WBC #/AREA URNS HPF: (no result) WBC/HPF (ref 0–5)

## 2020-08-16 PROCEDURE — G0378 HOSPITAL OBSERVATION PER HR: HCPCS

## 2020-08-16 NOTE — NUR
TO ROOM AS BP MONITOR ALERTED LOW BP. RECHECK OF BP WAS 86/49, PT PALE AND COLD
DIAPHORETIC REPEAT . PT C/O LT SIDED CP 10/10. REPEAT EKG AND DR SYED
TO BEDSIDE.

## 2020-08-16 NOTE — NUR
PATIENT TO ROOM VIA EMS AND PHYSICIAN AT BEDSIDE. PATIENT STATES CHEST PAIN AND
HIGH BLOOD SUGARS STARTING AT AROUND 0400 THIS MORNING

## 2020-08-16 NOTE — NUR
BP IMPROVE /72, PT TURNED TO LT SIDE AND PT STATES THAT MAKES LT CHEST
PAIN BETTER. PLACED SUPINE NO RESP DISTRESS, MEDICATED FOR PAIN AND NAUSEA AS
ORDERED.

## 2020-08-16 NOTE — NUR
RESTING IN BED WITH EYES CLOSED. AWAKENS READILY TO NAME. RESP NON-LABORED.
LUNGS CLEAR. NO PERIPHERAL EDEMA, PULSES INTACT. SALINE LOCK IN RAC, SITE
BENIGN. CARDIAC MONITOR SHOWS SR. DISCUSSED PLAN OF CARE. DENIES NEEDS AT THIS
TIME. CALL BELL IN REACH.

## 2020-08-17 VITALS — DIASTOLIC BLOOD PRESSURE: 64 MMHG | SYSTOLIC BLOOD PRESSURE: 97 MMHG

## 2020-08-17 VITALS — DIASTOLIC BLOOD PRESSURE: 74 MMHG | SYSTOLIC BLOOD PRESSURE: 133 MMHG

## 2020-08-17 VITALS — DIASTOLIC BLOOD PRESSURE: 56 MMHG | SYSTOLIC BLOOD PRESSURE: 116 MMHG

## 2020-08-17 VITALS — DIASTOLIC BLOOD PRESSURE: 66 MMHG | SYSTOLIC BLOOD PRESSURE: 120 MMHG

## 2020-08-17 VITALS — DIASTOLIC BLOOD PRESSURE: 56 MMHG | SYSTOLIC BLOOD PRESSURE: 110 MMHG

## 2020-08-17 VITALS — DIASTOLIC BLOOD PRESSURE: 94 MMHG | SYSTOLIC BLOOD PRESSURE: 152 MMHG

## 2020-08-17 VITALS — DIASTOLIC BLOOD PRESSURE: 74 MMHG | SYSTOLIC BLOOD PRESSURE: 138 MMHG

## 2020-08-17 VITALS — SYSTOLIC BLOOD PRESSURE: 107 MMHG | DIASTOLIC BLOOD PRESSURE: 63 MMHG

## 2020-08-17 VITALS — DIASTOLIC BLOOD PRESSURE: 75 MMHG | SYSTOLIC BLOOD PRESSURE: 154 MMHG

## 2020-08-17 VITALS — SYSTOLIC BLOOD PRESSURE: 151 MMHG | DIASTOLIC BLOOD PRESSURE: 77 MMHG

## 2020-08-17 LAB
ANION GAP SERPL CALCULATED.3IONS-SCNC: 10 MMOL/L
BASOPHILS NFR BLD AUTO: 1 % (ref 0–3)
BUN SERPL-MCNC: 56 MG/DL (ref 9–20)
BUN/CREAT SERPL: 43
CHLORIDE SERPL-SCNC: 100 MMOL/L (ref 95–108)
CO2 SERPL-SCNC: 24 MMOL/L (ref 22–30)
CREAT SERPL-MCNC: 1.3 MG/DL (ref 0.7–1.3)
EOSINOPHIL NFR BLD AUTO: 1 % (ref 0–8)
ERYTHROCYTE [DISTWIDTH] IN BLOOD BY AUTOMATED COUNT: 14.1 % (ref 11.5–15.5)
HCT VFR BLD AUTO: 40.8 % (ref 39–50)
HGB BLD-MCNC: 12.9 G/DL (ref 14–18)
IMM GRANULOCYTES NFR BLD: 1.8 % (ref 0–5)
LYMPHOCYTES NFR BLD: 20 % (ref 15–41)
MAGNESIUM SERPL-MCNC: 2.3 MG/DL (ref 1.6–2.3)
MCH RBC QN AUTO: 24.6 PG  CALC (ref 26–32)
MCHC RBC AUTO-ENTMCNC: 31.6 G/DL CAL (ref 32–36)
MCV RBC AUTO: 77.7 FL  CALC (ref 80–100)
MONOCYTES NFR BLD AUTO: 12 % (ref 2–13)
NEUTROPHILS # BLD AUTO: 2.83 THOU/UL (ref 1.82–7.42)
NEUTROPHILS NFR BLD AUTO: 65 % (ref 42–76)
PLATELET # BLD AUTO: 153 THOU/UL (ref 130–400)
POTASSIUM SERPL-SCNC: 5.1 MMOL/L (ref 3.5–5.1)
RBC # BLD AUTO: 5.25 MILL/UL (ref 4.7–6.1)
SODIUM SERPL-SCNC: 129 MMOL/L (ref 137–146)

## 2020-08-17 NOTE — NUR
RECEIVED REPORT FROM NURSE ARMAS PATIENT RESTING IN BED WITH EYES CLOSED,
BREATHING SHALLOW UNLABORED CALL LIGHT AT REACH.

## 2020-08-17 NOTE — NUR
PT A&OX3, ABLE TO MAKE NEEDS KNOWN. SR ON TELEMETRY, HR 82, PT DENIES CP, SOB
OR DISTRESS AT THIS TIME. PT REPORTING H/A, MEDICATED WITH PRN PRIOR TO SHIFT.
RESPIRATIONS EVEN/UNLABORED, SA02@99/100% ON 2LPM/NC. LS CLEAR THROUGHOUT.
ABDOMEN SOFT, NON-TENDER. PT VOIDING IN BEDSIDE URINAL, [id number] TEA COLORED
URINE. RAC/EMS/SL, FLUSHED WITHOUT RESISTANCE, NO S/S OF INFILTRATION OR
INFECTION NOTED AT SITE AT THIS TIME. CALL LIGHT IN REACH. WILL MONITOR.

## 2020-08-17 NOTE — NUR
PT ARRIVED TO MED/SURG ROOM 281 IN STABLE CONDITION VIA WHEELCHAIR ACCOMPANIED
BY FELIBERTO MEDLEY;PT AMBULATED TO BEDSIDE WITH A STEADY GAIT;WT AND VS OBTAINED BY
CELINA MONTANEZ;PT A&O X3,ORIENTED TO ROOM AND CALL LIGHT SYSTEM;PT REPORTS HEADACHE
PAIN THAT HAS NOT BEEN UNRESOLVED BY PO TYLENOL,ANRP NOTIFIED AND NEW ORDERS
RECEIVED, PAIN SCALE AND REPORTING EDUCATED;RESPIRATIONS EVEN AND UNLABORED ON
RA,CLEAR LUNG SOUNDS;ABDOMEN DISTENDED/SOFT ON PALPATION AND ACTIVE IN ALL 4
QUADRANTS,PT REPORTS LAST BM 08/16/20;WEAK PEDAL PULSES;SKIN INTACT;TELE
MONITORING PLACED ON PT AT THIS TIME;EMS #20G TO RAC FLUSHED AND PATENT,SITE
APPEARS HEALTHY;PT DENIES ANY ADDITIONAL NEEDS AT THIS TIME AND IS ENCOURAGED
TO CALL FOR ASSISTANCE IF NEEDED;PT PLACED IN AIR/CONTACT ROOM DUE TO COVID19
DX;FALL PRECAUTIONS IN PLACE WITH BED IN THE LOWEST POSITION AND CALL LIGHT IN
REACH;WILL CONTINUE TO MONITOR

## 2020-08-17 NOTE — NUR
[facility] CALLED FOR UPDATE ON PT, RECOMMENDED EMERGENCY MEDICAID AND PLACEMENT
AT [facility]. REFERRED TO CM.

## 2020-08-17 NOTE — NUR
DR. ANDRADE AT BEDSIDE FOR ASSESSMENT AND TO DISCUSS PLAN OF CARE, NEW ORDERS
RECIEVED, TRANSFER TO MS WITH TELEMETRY MONITORING.

## 2020-08-17 NOTE — NUR
PATIENT ALERT ORIENETD ABLE TO MAKE NEEDS KNOWN, WITH EMS SITE ON RAC G 20
PATENT FLUSHES WELL, REMAINS ON TELE SR 76, DENIES CHEST DISCOMFORTS,
RESPIRATIONS UNLABOREDM,LBM 8/16,  COVERAGE GIVEN, BOWEL SOUND ACTIVE ON
ALL QUADRANT, CALL LIGHT AT REACH.

## 2020-08-17 NOTE — NUR
NOTIFIED DR. GARDINER ABOUT PATIENT REQUEST FOR PAIN MEDICATION BESISED TYLENOL,
WITH ORDERS MADE SENT TO [facility].

## 2020-08-17 NOTE — NUR
PT REPORTS THAT HEADACHE PAIN WAS UNRELIEVED BY X1 ULTRAM OBIE CARABALLO,ANRP
NOTIFIED AND NEW ORDER RECEIVED;WILL CONTINUE TO MONITOR

## 2020-08-17 NOTE — NUR
PT MEDICATED WITH X1 ULTRAM 50MG PO FOR HEADACHE PAIN RATING 8/10 ON THE PAIN
SCALE;PT DENIES ANY ADDITIONAL NEEDS AT THIS TIME;ENCOURAGED TO CALL FOR
ASSISTANCE IF NEEDED;WILL MONITOR FOR EFFECTIVENESS

## 2020-08-17 NOTE — NUR
PT RESTING IN SEMI FOWLERS POSITION;RESPIRATIONS EVEN AND UNLABORED ON RA;PT
REPORTS HEADACHE PAIN HAS SUBSIDED AT THIS TIME;TELE MONITORING IN PLACE;IV
SITE PATENT;ACUCHECK 341, PT MEDICATED WITH 7 UNITS OF NOVOLOG PER ORDER;PT
DENIES ANY ADDITIONAL NEEDS AT THIS TIME;ENCOURAGED TO CALL FOR ASSISTANCE IF
NEEDED;CALL LIGHT IN REACH;WILL CONTINUE TO MONITOR

## 2020-08-18 VITALS — SYSTOLIC BLOOD PRESSURE: 124 MMHG | DIASTOLIC BLOOD PRESSURE: 67 MMHG

## 2020-08-18 VITALS — SYSTOLIC BLOOD PRESSURE: 133 MMHG | DIASTOLIC BLOOD PRESSURE: 77 MMHG

## 2020-08-18 VITALS — DIASTOLIC BLOOD PRESSURE: 67 MMHG | SYSTOLIC BLOOD PRESSURE: 105 MMHG

## 2020-08-18 VITALS — DIASTOLIC BLOOD PRESSURE: 82 MMHG | SYSTOLIC BLOOD PRESSURE: 129 MMHG

## 2020-08-18 VITALS — DIASTOLIC BLOOD PRESSURE: 67 MMHG | SYSTOLIC BLOOD PRESSURE: 117 MMHG

## 2020-08-18 VITALS — DIASTOLIC BLOOD PRESSURE: 55 MMHG | SYSTOLIC BLOOD PRESSURE: 119 MMHG

## 2020-08-18 NOTE — NUR
PT RESTING IN SUPINE POSITION,A&O X3;VS OBTAINED AND ASSESSMENT COMPLETED;PT
REPORTS HEADACHE PAIN RATING 8/10 ON THE PAIN SCALE AND UNRESOLVED BY PRN
ULTRAM OR MD DARREL TO BE NOTIFIED;RESPIRATIONS EVEN AND UNLABORED ON
RA,CLEAR LUNG SOUNDS NOTED AND NON-PRODUCTIVE COUGH;ABDOMEN SOFT ON PALPATION
AND ACTIVE IN ALL 4 QUADRANTS;WEAK PEDAL PULSES WITH TRACE EDEMA NOTED TO
BLE,ENCOURAGED ELEVATION;SKIN INTACT;TELE MONITORING IN PLACE;EMS #20G TO RAC
FLUSHED AND PATENT,SITE APPEARS HEALTHY;ACCUCHECK 276, PT COVERED WITH SLIDING
SCALE NOVOLOG PER ORDER;PT UNDER AIR/CONTACT PRECAUTIONS AT THIS TIME DUE TO
COVID19 PENDING RESULTS;PT DENIES ANY ADDITIONAL NEEDS AT THIS TIME AND IS
ENCOURAGED TO CALL FOR ASSISTANCE IF NEEDED;FALL PRECAUTIONS IN PLACE WITH
CALL LIGHT IN REACH;WILL CONTINUE TO MONITOR

## 2020-08-18 NOTE — NUR
RECEIVED REPORT FROM NURSE PAWEL, PATIENT RESTING IN BED, EYES CLOSED,
BREATHING EVEN UNLABORED, CALL LIGHT AT REACH.

## 2020-08-18 NOTE — NUR
PT MEDICATED WITH PRN FIORICET FOR HEADACHE PAIN RATING 8/10 ON THE PAIN
SCALE,WILL CONTINUE TO MONITOR

## 2020-08-18 NOTE — NUR
PT RESTING IN SEMI FOWLERS POSITION;RESPIRATIONS REMAIN EVEN AND UNLABORED ON
RA;PT DENIES ANY CURRENT PAIN OR DISCOMFORTS;TELE MONITORING IN PLACE;IV SITE
PATENT;ACCUCHECK 267, PT WAS COVERED WITH SLIDING SCALE NOVOLOG PER
ORDER;ASSESSMENT REMAINS UNCHANGED AT THIS TIME;PT ENCOURAGED TO CALL FOR
ASSISTANCE IF NEEDED;FALL PRECAUTIONS IN PLACE WITH CALL LIGHT IN REACH;WILL
CONTINUE TO MONITOR

## 2020-08-18 NOTE — NUR
PT APPEARS TO BE SLEEPING IN SEMI FOWLERS POSITION,WAKES EASILY TO VERBAL
STIMULI;RESPIRATIONS EVEN AND UNLABORED ON RA;PT REPORTS HEADACHE PAIN HAS
SUBSIDED SINCE FIORUCET ADMINISTRATION;TELE MONITORING IN PLACE;IV SITE
PATENT;ASSESSMENT REMAINS UNCHANGED AT THIS TIME;ENCOURAGED TO CALL FOR
ASSISTANCE IF NEEDED;CALL LIGHT IN REACH;WILL CONTINUE TO MONITOR

## 2020-08-18 NOTE — NUR
PATIENT ALERT ORIENETD ABLE TO MAKE NEEDS KNOWN, WITH SALINE LOCK ON LAC
PATENT FLUSHES WELL, REMAINS ON TELE SR 76, LBM 8/18, C/O HEADACHE PRN
FIORICET GIVEN WILL REEVALUATE, BOWEL SOUNDS ACTIVE IN ALL QUADRANTS, DENIES
CHEST DISCOMFORTS, CALL LIGHT AT REACH.

## 2020-08-18 NOTE — NUR
PT REPORTS HEADACHE PAIN AND REQUESTS PRN FIORICET AND ANTIEMETIC,PT MEDICATED
WITH PRN FIORICET AND ZOFRAN 4 MG IVP AT THIS TIME;;WILL CONTINUE TO MONITOR
FOR EFFECTIVENESS

## 2020-08-18 NOTE — NUR
PT RESTING IN SEMI FOWLERS POSITION;PT REPORTS FIORICET WAS EFFECTIVE FOR
HEADACHE PAIN;PT MEDICATED WITH PRN MOM TO ASSIST WITH BOWEL CARE PER
REQUEST;EMS #20G TO RAC REMOVED WITH CATHETER INTACT DUE TO IV SITE
EXPIRATION;NEW #22G STARTED TO LAC ON 1ST ATTEMPT BY THIS WRITTER,PT TOLERATED
WELL;PT DENIES ANY ADDITIONAL NEEDS AT THIS TIME AND IS ENCOURAGED TO CALL FOR
ASSISTANCE IF NEEDED;CALL LIGHT IN REACH;WILL CONTINUE TO MONITOR

## 2020-08-19 VITALS — SYSTOLIC BLOOD PRESSURE: 155 MMHG | DIASTOLIC BLOOD PRESSURE: 73 MMHG

## 2020-08-19 VITALS — DIASTOLIC BLOOD PRESSURE: 76 MMHG | SYSTOLIC BLOOD PRESSURE: 140 MMHG

## 2020-08-19 VITALS — SYSTOLIC BLOOD PRESSURE: 155 MMHG | DIASTOLIC BLOOD PRESSURE: 64 MMHG

## 2020-08-19 VITALS — DIASTOLIC BLOOD PRESSURE: 79 MMHG | SYSTOLIC BLOOD PRESSURE: 119 MMHG

## 2020-08-19 NOTE — NUR
PATIENT RESTING IN BED WITH EYS CLOSED, BREATHING EVEN UNLABORED CALL LIGHT
AT [facility].

## 2020-09-19 ENCOUNTER — HOSPITAL ENCOUNTER (OUTPATIENT)
Dept: HOSPITAL 82 - ED | Age: 60
Setting detail: OBSERVATION
LOS: 1 days | Discharge: HOME HEALTH | End: 2020-09-20
Attending: INTERNAL MEDICINE | Admitting: INTERNAL MEDICINE
Payer: MEDICARE

## 2020-09-19 VITALS — SYSTOLIC BLOOD PRESSURE: 142 MMHG | DIASTOLIC BLOOD PRESSURE: 64 MMHG

## 2020-09-19 VITALS — BODY MASS INDEX: 33.63 KG/M2 | WEIGHT: 240.19 LBS | HEIGHT: 71 IN

## 2020-09-19 VITALS — DIASTOLIC BLOOD PRESSURE: 57 MMHG | SYSTOLIC BLOOD PRESSURE: 106 MMHG

## 2020-09-19 DIAGNOSIS — Z90.49: ICD-10-CM

## 2020-09-19 DIAGNOSIS — I10: ICD-10-CM

## 2020-09-19 DIAGNOSIS — I48.91: ICD-10-CM

## 2020-09-19 DIAGNOSIS — E87.5: ICD-10-CM

## 2020-09-19 DIAGNOSIS — Z96.89: ICD-10-CM

## 2020-09-19 DIAGNOSIS — D64.9: ICD-10-CM

## 2020-09-19 DIAGNOSIS — C85.90: ICD-10-CM

## 2020-09-19 DIAGNOSIS — K59.00: ICD-10-CM

## 2020-09-19 DIAGNOSIS — Z87.891: ICD-10-CM

## 2020-09-19 DIAGNOSIS — Z86.19: ICD-10-CM

## 2020-09-19 DIAGNOSIS — Z92.21: ICD-10-CM

## 2020-09-19 DIAGNOSIS — I25.118: ICD-10-CM

## 2020-09-19 DIAGNOSIS — E11.43: Primary | ICD-10-CM

## 2020-09-19 DIAGNOSIS — E87.1: ICD-10-CM

## 2020-09-19 DIAGNOSIS — Z95.5: ICD-10-CM

## 2020-09-19 DIAGNOSIS — R50.9: ICD-10-CM

## 2020-09-19 DIAGNOSIS — Z85.819: ICD-10-CM

## 2020-09-19 DIAGNOSIS — Z95.1: ICD-10-CM

## 2020-09-19 DIAGNOSIS — Z79.4: ICD-10-CM

## 2020-09-19 DIAGNOSIS — Z20.828: ICD-10-CM

## 2020-09-19 DIAGNOSIS — E78.5: ICD-10-CM

## 2020-09-19 DIAGNOSIS — E11.65: ICD-10-CM

## 2020-09-19 DIAGNOSIS — I48.92: ICD-10-CM

## 2020-09-19 DIAGNOSIS — K31.84: ICD-10-CM

## 2020-09-19 DIAGNOSIS — Z95.2: ICD-10-CM

## 2020-09-19 DIAGNOSIS — Z86.73: ICD-10-CM

## 2020-09-19 DIAGNOSIS — Z92.3: ICD-10-CM

## 2020-09-19 LAB
ALBUMIN SERPL-MCNC: 3.5 G/DL (ref 3.2–5)
ALP SERPL-CCNC: 137 U/L (ref 38–126)
ANION GAP SERPL CALCULATED.3IONS-SCNC: 14 MMOL/L
AST SERPL-CCNC: 17 U/L (ref 19–48)
BASOPHILS NFR BLD AUTO: 1 % (ref 0–3)
BILIRUB UR QL STRIP.AUTO: NEGATIVE
BUN SERPL-MCNC: 50 MG/DL (ref 8–23)
BUN/CREAT SERPL: 36
CHLORIDE SERPL-SCNC: 98 MMOL/L (ref 95–108)
CO2 SERPL-SCNC: 21 MMOL/L (ref 22–30)
COLOR UR AUTO: YELLOW
CREAT SERPL-MCNC: 1.4 MG/DL (ref 0.7–1.3)
EOSINOPHIL NFR BLD AUTO: 1 % (ref 0–8)
ERYTHROCYTE [DISTWIDTH] IN BLOOD BY AUTOMATED COUNT: 14.4 % (ref 11.5–15.5)
GLUCOSE UR STRIP.AUTO-MCNC: NEGATIVE MG/DL
HCT VFR BLD AUTO: 32.1 % (ref 39–50)
HGB BLD-MCNC: 10.1 G/DL (ref 14–18)
HGB UR QL STRIP.AUTO: (no result)
IMM GRANULOCYTES NFR BLD: 1.3 % (ref 0–5)
KETONES UR STRIP.AUTO-MCNC: NEGATIVE MG/DL
LEUKOCYTE ESTERASE UR QL STRIP.AUTO: NEGATIVE
LIPASE SERPL-CCNC: 38 U/L (ref 23–300)
LYMPHOCYTES NFR BLD: 18 % (ref 15–41)
MCH RBC QN AUTO: 24.3 PG  CALC (ref 26–32)
MCHC RBC AUTO-ENTMCNC: 31.5 G/DL CAL (ref 32–36)
MCV RBC AUTO: 77.3 FL  CALC (ref 80–100)
MONOCYTES NFR BLD AUTO: 15 % (ref 2–13)
NEUTROPHILS # BLD AUTO: 2.53 THOU/UL (ref 1.82–7.42)
NEUTROPHILS NFR BLD AUTO: 65 % (ref 42–76)
NITRITE UR QL STRIP.AUTO: NEGATIVE
PH UR STRIP.AUTO: 5 [PH] (ref 4.5–8)
PLATELET # BLD AUTO: 172 THOU/UL (ref 130–400)
POTASSIUM SERPL-SCNC: 5.3 MMOL/L (ref 3.5–5.1)
PROT SERPL-MCNC: 6.8 G/DL (ref 6.3–8.2)
PROT UR QL STRIP.AUTO: (no result) MG/DL
RBC # BLD AUTO: 4.15 MILL/UL (ref 4.7–6.1)
SODIUM SERPL-SCNC: 128 MMOL/L (ref 137–146)
SP GR UR STRIP.AUTO: 1.01
UROBILINOGEN UR QL STRIP.AUTO: 0.2 E.U./DL

## 2020-09-19 PROCEDURE — G0378 HOSPITAL OBSERVATION PER HR: HCPCS

## 2020-09-19 NOTE — NUR
PT ARRIVED TO MS ACCOMPANIED BY GUALBERTO DAO. PT A&O X3. NO DISTRESS NOTED. PT C/O
OF ABD PAIN 5/10. STATES HE WAS JUST RELEASED FROM [facility], PT STATES HE WENT
FOR ABD PAIN THEY DID A LOT OF "STUDIES" AND THEY ALL CAME BACK NORMAL. STATES
HIS PAIN IS GENERALIZED. LOOSE BM LAST NIGHT. NO OTHER NEEDS AT THIS TIME.
ASSESSMENT COMPLETED. ORIENTED PT TO ROOM. CALL LIGHT IN REACH. CONTINUE TO
MONITOR.

## 2020-09-19 NOTE — NUR
PATIENT ALERT, VERBAL, ABLE TO MAKE NEEDS KNOWN. ABLE TO TOLERATE MEDS WELL
WHOLE. CONT OF BOWEL AND BLADDER. OUT OF BED DAILY AD JT IN ROOM--AMBULATES
WITH ASSIST OF WALKER. FSBS AS ORDERED WITH SSI PRN--NO S/S OF GLYCEMIC
REACTION NOTED. REQUESTED PRN PAIN MEDS TIMES 1 THIS SHIFT FOR C/OS OF ABD
PAIN--GIVEN @ HS WITH POSITIVE EFFECT. REMAINS ON AIRBORNE CONTACT
PRECAUTIONS AS WELL RELATED TO POSITIVE IGG AND PENDING SWAB--AFEBRILE.
WILL CONT TO MONITOR FOR ANY FURTHER CHANGES.

## 2020-09-19 NOTE — NUR
RECEIVED FROM EMS.  MOVED SELF TO STRETCHER.  REPORTS ABDOMINAL PAIN SINCE
THRUS.  [facility].  PT ALSO REPORTS FEELING
SHORT OF BREATH DUE TO ABDOMINAL DISCOMFORT.  AO X 3.  SKIN PINK WARM AND DRY

## 2020-09-20 VITALS — DIASTOLIC BLOOD PRESSURE: 75 MMHG | SYSTOLIC BLOOD PRESSURE: 150 MMHG

## 2020-09-20 VITALS — SYSTOLIC BLOOD PRESSURE: 150 MMHG | DIASTOLIC BLOOD PRESSURE: 75 MMHG

## 2020-09-20 VITALS — DIASTOLIC BLOOD PRESSURE: 51 MMHG | SYSTOLIC BLOOD PRESSURE: 120 MMHG

## 2020-09-20 LAB
ANION GAP SERPL CALCULATED.3IONS-SCNC: 13 MMOL/L
BASOPHILS NFR BLD AUTO: 1 % (ref 0–3)
BUN SERPL-MCNC: 45 MG/DL (ref 8–23)
BUN/CREAT SERPL: 34
CHLORIDE SERPL-SCNC: 99 MMOL/L (ref 95–108)
CO2 SERPL-SCNC: 23 MMOL/L (ref 22–30)
CREAT SERPL-MCNC: 1.3 MG/DL (ref 0.7–1.3)
EOSINOPHIL NFR BLD AUTO: 1 % (ref 0–8)
ERYTHROCYTE [DISTWIDTH] IN BLOOD BY AUTOMATED COUNT: 14.7 % (ref 11.5–15.5)
HCT VFR BLD AUTO: 33.3 % (ref 39–50)
HGB BLD-MCNC: 10.3 G/DL (ref 14–18)
IMM GRANULOCYTES NFR BLD: 1.2 % (ref 0–5)
LYMPHOCYTES NFR BLD: 25 % (ref 15–41)
MCH RBC QN AUTO: 24.4 PG  CALC (ref 26–32)
MCHC RBC AUTO-ENTMCNC: 30.9 G/DL CAL (ref 32–36)
MCV RBC AUTO: 78.9 FL  CALC (ref 80–100)
MONOCYTES NFR BLD AUTO: 14 % (ref 2–13)
NEUTROPHILS # BLD AUTO: 1.93 THOU/UL (ref 1.82–7.42)
NEUTROPHILS NFR BLD AUTO: 58 % (ref 42–76)
PLATELET # BLD AUTO: 183 THOU/UL (ref 130–400)
POTASSIUM SERPL-SCNC: 5.4 MMOL/L (ref 3.5–5.1)
RBC # BLD AUTO: 4.22 MILL/UL (ref 4.7–6.1)
SODIUM SERPL-SCNC: 130 MMOL/L (ref 137–146)

## 2020-09-20 NOTE — NUR
PATIENT RESTING SOUNDLY IN BED WITH EYES CLOSED AT THIS TIME. VOICES NO
COMPLAINTS OF PAIN OR DISCOMFORT. PIV SITE PATENT--FLUSHES WELL--SITE
UNREMARKABLE. WILL CONT TO MONITOR FOR ANY FURTHER CHANGES.

## 2020-09-20 NOTE — NUR
PT REPORTED HE HAS NOT RIDE AS HIS FRIEND WHO WAS SUPPOSED TO PICK HIP HAS
CANCELLED, HOUSE SUPERVISOR NOTIFIED AND WILL AUTHORISE PAYMENT FOR CAB
TRANSPORT.

## 2020-09-20 NOTE — NUR
SHIFT CHANGE REPORT, PT AWAKE ALERT AND ORIENTED, C/O UPSET STOMACH AND PAIN,
REFUSING ALL MEDS AT THIS TIME, ADVISED WILL NOTIFY MD FOR ORDERS FOR
CONCERNS, WILL CONTINUE TO MONITOR AND ADDRESS NEEDS.

## 2020-09-20 NOTE — NUR
Discharge instructions given. Patient verbalizes understanding of same.
Discharged in stable condition via Wheelchair to Home with
*Other. All belongings sent with pt.
TRANSPORTED HOME VIA CAB

## 2020-09-20 NOTE — NUR
PATIENT RESTING SOUNDLY IN BED WITH EYES CLOSED AT THIS TIME. NO APPARENT
DISTRESS AND NO COMPLAINTS OFFERED. PIV SITE PATENT--FLUSHES WELL--SITE
UNREAMRKABLE. NO S/S OF GLYCEMIC REACTION NOTED. REMAINS ON AIRBORNE CONTACT
PRECAUTIONS AS WELL--AFEBRILE. WILL CONT TO MONITOR FOR ANY FURTHER CHANGES.

## 2020-09-20 NOTE — NUR
DR HAHN NOTIFIED OF CONCERN OF NAUSEA, GAVE ORDERS, CONCERN ADDRESSED AND PT
TOOK MEDS AFTER GIVEN ANTIEMETIC MED.

## 2020-09-26 ENCOUNTER — HOSPITAL ENCOUNTER (EMERGENCY)
Dept: HOSPITAL 82 - ED | Age: 60
Discharge: HOME | End: 2020-09-26
Payer: MEDICARE

## 2020-09-26 VITALS — BODY MASS INDEX: 32.41 KG/M2 | HEIGHT: 71 IN | WEIGHT: 231.49 LBS

## 2020-09-26 VITALS — SYSTOLIC BLOOD PRESSURE: 176 MMHG | DIASTOLIC BLOOD PRESSURE: 73 MMHG

## 2020-09-26 DIAGNOSIS — I48.92: ICD-10-CM

## 2020-09-26 DIAGNOSIS — Z79.4: ICD-10-CM

## 2020-09-26 DIAGNOSIS — J44.1: Primary | ICD-10-CM

## 2020-09-26 DIAGNOSIS — Z95.5: ICD-10-CM

## 2020-09-26 DIAGNOSIS — C85.90: ICD-10-CM

## 2020-09-26 DIAGNOSIS — Z95.2: ICD-10-CM

## 2020-09-26 DIAGNOSIS — N39.0: ICD-10-CM

## 2020-09-26 DIAGNOSIS — Z95.1: ICD-10-CM

## 2020-09-26 DIAGNOSIS — I10: ICD-10-CM

## 2020-09-26 DIAGNOSIS — Z86.73: ICD-10-CM

## 2020-09-26 DIAGNOSIS — E11.9: ICD-10-CM

## 2020-09-26 DIAGNOSIS — I48.91: ICD-10-CM

## 2020-09-26 DIAGNOSIS — Z86.19: ICD-10-CM

## 2020-09-26 LAB
ALBUMIN SERPL-MCNC: 3.6 G/DL (ref 3.2–5)
ALP SERPL-CCNC: 178 U/L (ref 38–126)
AMORPH SED URNS QL MICRO: (no result) HPF
ANION GAP SERPL CALCULATED.3IONS-SCNC: 16 MMOL/L
AST SERPL-CCNC: 15 U/L (ref 19–48)
BASOPHILS NFR BLD AUTO: 1 % (ref 0–3)
BILIRUB UR QL STRIP.AUTO: (no result)
BUN SERPL-MCNC: 26 MG/DL (ref 8–23)
BUN/CREAT SERPL: 21
CHLORIDE SERPL-SCNC: 101 MMOL/L (ref 95–108)
CO2 SERPL-SCNC: 18 MMOL/L (ref 22–30)
COLOR UR AUTO: YELLOW
CREAT SERPL-MCNC: 1.3 MG/DL (ref 0.7–1.3)
EOSINOPHIL NFR BLD AUTO: 1 % (ref 0–8)
ERYTHROCYTE [DISTWIDTH] IN BLOOD BY AUTOMATED COUNT: 14.8 % (ref 11.5–15.5)
GLUCOSE UR STRIP.AUTO-MCNC: 250 MG/DL
HCT VFR BLD AUTO: 32.5 % (ref 39–50)
HGB BLD-MCNC: 10.1 G/DL (ref 14–18)
HGB UR QL STRIP.AUTO: (no result)
IMM GRANULOCYTES NFR BLD: 3.5 % (ref 0–5)
INR PPP: 1.1 RATIO (ref 0.7–1.3)
KETONES UR STRIP.AUTO-MCNC: NEGATIVE MG/DL
LEUKOCYTE ESTERASE UR QL STRIP.AUTO: NEGATIVE
LYMPHOCYTES NFR BLD: 21 % (ref 15–41)
MCH RBC QN AUTO: 25.2 PG  CALC (ref 26–32)
MCHC RBC AUTO-ENTMCNC: 31.1 G/DL CAL (ref 32–36)
MCV RBC AUTO: 81 FL  CALC (ref 80–100)
MONOCYTES NFR BLD AUTO: 15 % (ref 2–13)
NEUTROPHILS # BLD AUTO: 2.73 THOU/UL (ref 1.82–7.42)
NEUTROPHILS NFR BLD AUTO: 59 % (ref 42–76)
NITRITE UR QL STRIP.AUTO: NEGATIVE
PH UR STRIP.AUTO: 5 [PH] (ref 4.5–8)
PLATELET # BLD AUTO: 258 THOU/UL (ref 130–400)
POTASSIUM SERPL-SCNC: 5.2 MMOL/L (ref 3.5–5.1)
PROT SERPL-MCNC: 6.9 G/DL (ref 6.3–8.2)
PROT UR QL STRIP.AUTO: >=300 MG/DL
PROTHROMBIN TIME: 10.5 SECONDS (ref 9–12.5)
RBC # BLD AUTO: 4.01 MILL/UL (ref 4.7–6.1)
RBC #/AREA URNS HPF: (no result) RBC/HPF (ref 0–5)
SODIUM SERPL-SCNC: 130 MMOL/L (ref 137–146)
SP GR UR STRIP.AUTO: >=1.03
UROBILINOGEN UR QL STRIP.AUTO: 0.2 E.U./DL
WBC #/AREA URNS HPF: (no result) WBC/HPF (ref 0–5)

## 2020-09-28 ENCOUNTER — HOSPITAL ENCOUNTER (INPATIENT)
Dept: HOSPITAL 82 - ED | Age: 60
LOS: 4 days | Discharge: NURSING, REHAB OR LONG-TERM CARE | DRG: 641 | End: 2020-10-02
Attending: INTERNAL MEDICINE | Admitting: INTERNAL MEDICINE
Payer: MEDICARE

## 2020-09-28 VITALS — SYSTOLIC BLOOD PRESSURE: 162 MMHG | DIASTOLIC BLOOD PRESSURE: 74 MMHG

## 2020-09-28 VITALS — WEIGHT: 253.25 LBS | HEIGHT: 72 IN | BODY MASS INDEX: 34.3 KG/M2

## 2020-09-28 VITALS — DIASTOLIC BLOOD PRESSURE: 81 MMHG | SYSTOLIC BLOOD PRESSURE: 166 MMHG

## 2020-09-28 VITALS — SYSTOLIC BLOOD PRESSURE: 131 MMHG | DIASTOLIC BLOOD PRESSURE: 52 MMHG

## 2020-09-28 VITALS — SYSTOLIC BLOOD PRESSURE: 181 MMHG | DIASTOLIC BLOOD PRESSURE: 78 MMHG

## 2020-09-28 VITALS — SYSTOLIC BLOOD PRESSURE: 161 MMHG | DIASTOLIC BLOOD PRESSURE: 66 MMHG

## 2020-09-28 VITALS — SYSTOLIC BLOOD PRESSURE: 136 MMHG | DIASTOLIC BLOOD PRESSURE: 62 MMHG

## 2020-09-28 VITALS — DIASTOLIC BLOOD PRESSURE: 61 MMHG | SYSTOLIC BLOOD PRESSURE: 138 MMHG

## 2020-09-28 DIAGNOSIS — E11.9: ICD-10-CM

## 2020-09-28 DIAGNOSIS — I10: ICD-10-CM

## 2020-09-28 DIAGNOSIS — C85.90: ICD-10-CM

## 2020-09-28 DIAGNOSIS — K59.09: ICD-10-CM

## 2020-09-28 DIAGNOSIS — I48.92: ICD-10-CM

## 2020-09-28 DIAGNOSIS — E87.1: Primary | ICD-10-CM

## 2020-09-28 DIAGNOSIS — Z79.4: ICD-10-CM

## 2020-09-28 DIAGNOSIS — Z87.891: ICD-10-CM

## 2020-09-28 DIAGNOSIS — Z20.828: ICD-10-CM

## 2020-09-28 DIAGNOSIS — Z85.72: ICD-10-CM

## 2020-09-28 DIAGNOSIS — E87.5: ICD-10-CM

## 2020-09-28 DIAGNOSIS — Z86.19: ICD-10-CM

## 2020-09-28 DIAGNOSIS — Z95.1: ICD-10-CM

## 2020-09-28 DIAGNOSIS — E11.65: ICD-10-CM

## 2020-09-28 DIAGNOSIS — J44.1: ICD-10-CM

## 2020-09-28 DIAGNOSIS — Z95.5: ICD-10-CM

## 2020-09-28 DIAGNOSIS — E11.43: ICD-10-CM

## 2020-09-28 DIAGNOSIS — E87.2: ICD-10-CM

## 2020-09-28 DIAGNOSIS — N13.8: ICD-10-CM

## 2020-09-28 DIAGNOSIS — I25.118: ICD-10-CM

## 2020-09-28 DIAGNOSIS — N39.0: ICD-10-CM

## 2020-09-28 DIAGNOSIS — D64.9: ICD-10-CM

## 2020-09-28 DIAGNOSIS — K21.9: ICD-10-CM

## 2020-09-28 DIAGNOSIS — Z92.21: ICD-10-CM

## 2020-09-28 DIAGNOSIS — R06.02: ICD-10-CM

## 2020-09-28 DIAGNOSIS — I48.91: ICD-10-CM

## 2020-09-28 DIAGNOSIS — N17.9: ICD-10-CM

## 2020-09-28 DIAGNOSIS — Z92.3: ICD-10-CM

## 2020-09-28 DIAGNOSIS — Z85.819: ICD-10-CM

## 2020-09-28 DIAGNOSIS — E78.5: ICD-10-CM

## 2020-09-28 DIAGNOSIS — Z95.2: ICD-10-CM

## 2020-09-28 DIAGNOSIS — N43.3: ICD-10-CM

## 2020-09-28 DIAGNOSIS — I50.9: ICD-10-CM

## 2020-09-28 DIAGNOSIS — N32.89: ICD-10-CM

## 2020-09-28 DIAGNOSIS — Z86.73: ICD-10-CM

## 2020-09-28 DIAGNOSIS — R33.8: ICD-10-CM

## 2020-09-28 DIAGNOSIS — K31.84: ICD-10-CM

## 2020-09-28 DIAGNOSIS — E86.9: ICD-10-CM

## 2020-09-28 DIAGNOSIS — N40.1: ICD-10-CM

## 2020-09-28 DIAGNOSIS — I11.0: ICD-10-CM

## 2020-09-28 LAB
ALBUMIN SERPL-MCNC: 3.6 G/DL (ref 3.2–5)
ALP SERPL-CCNC: 152 U/L (ref 38–126)
ANION GAP SERPL CALCULATED.3IONS-SCNC: 16 MMOL/L
AST SERPL-CCNC: 18 U/L (ref 19–48)
BASOPHILS NFR BLD AUTO: 1 % (ref 0–3)
BILIRUB UR QL STRIP.AUTO: NEGATIVE
BUN SERPL-MCNC: 30 MG/DL (ref 8–23)
BUN/CREAT SERPL: 22
CHLORIDE SERPL-SCNC: 96 MMOL/L (ref 95–108)
CO2 SERPL-SCNC: 18 MMOL/L (ref 22–30)
COLOR UR AUTO: YELLOW
CREAT SERPL-MCNC: 1.4 MG/DL (ref 0.7–1.3)
EOSINOPHIL NFR BLD AUTO: 2 % (ref 0–8)
ERYTHROCYTE [DISTWIDTH] IN BLOOD BY AUTOMATED COUNT: 14.8 % (ref 11.5–15.5)
GLUCOSE UR STRIP.AUTO-MCNC: NEGATIVE MG/DL
HCT VFR BLD AUTO: 30.9 % (ref 39–50)
HGB BLD-MCNC: 9.9 G/DL (ref 14–18)
HGB UR QL STRIP.AUTO: (no result)
IMM GRANULOCYTES NFR BLD: 1.9 % (ref 0–5)
KETONES UR STRIP.AUTO-MCNC: NEGATIVE MG/DL
LEUKOCYTE ESTERASE UR QL STRIP.AUTO: NEGATIVE
LIPASE SERPL-CCNC: 31 U/L (ref 23–300)
LYMPHOCYTES NFR BLD: 18 % (ref 15–41)
MCH RBC QN AUTO: 25.1 PG  CALC (ref 26–32)
MCHC RBC AUTO-ENTMCNC: 32 G/DL CAL (ref 32–36)
MCV RBC AUTO: 78.4 FL  CALC (ref 80–100)
MONOCYTES NFR BLD AUTO: 10 % (ref 2–13)
NEUTROPHILS # BLD AUTO: 2.95 THOU/UL (ref 1.82–7.42)
NEUTROPHILS NFR BLD AUTO: 69 % (ref 42–76)
NITRITE UR QL STRIP.AUTO: NEGATIVE
PH UR STRIP.AUTO: 5 [PH] (ref 4.5–8)
PLATELET # BLD AUTO: 229 THOU/UL (ref 130–400)
POTASSIUM SERPL-SCNC: 4.9 MMOL/L (ref 3.5–5.1)
PROT SERPL-MCNC: 6.9 G/DL (ref 6.3–8.2)
PROT UR QL STRIP.AUTO: 100 MG/DL
RBC # BLD AUTO: 3.94 MILL/UL (ref 4.7–6.1)
RBC #/AREA URNS HPF: (no result) RBC/HPF (ref 0–5)
SODIUM SERPL-SCNC: 124 MMOL/L (ref 137–146)
SP GR UR STRIP.AUTO: 1.02
UROBILINOGEN UR QL STRIP.AUTO: 0.2 E.U./DL
WBC #/AREA URNS HPF: (no result) WBC/HPF (ref 0–5)

## 2020-09-28 PROCEDURE — S0164 INJECTION PANTROPRAZOLE: HCPCS

## 2020-09-28 NOTE — NUR
HEART MONITOR SHOWING HR IN 40'S-66, PT OBSERVED TO BE SLEEPNG ON RIGHT SIDE.
LEADS CHECKED AND PULSE OX REPLACED. PT 02 SAT LEVELS @99% NOW ON RA. PT IS
NOT SHOWING ANY SIGNS OF SYMPTOMS OF LOW HEART RATE. WILL MONITOR CLOSELY.

## 2020-09-28 NOTE — NUR
PT ADMITTED TO ICU BED 1 FROM ED VIA STRETCHER, DX HYPONUTREMIA AND HYDROCELL.
PT TRANSFERRED SELF FROM STRETCHER TO BED, SLOW STEADY GAIT. PT A&0X4, ABLE TO
MAKE NEEDS KNOWN, IGG+, SWAB PENDING. PT AFLUTTER WITH VARIABLE AVB ON
TELEMTERY, HR 67. PT DENIES CP, SOB OR DISTRESS AT THIS TIME. RESPIRATIONS
EVEN/UNLABORED, SAO2@100%RA, LS CLEAR THROUGHOUT, ABDOMEN SOFT, NON-TENDER,
BSX4 ACTIVE, LBM 9-28-20. PT NOTED WITH SWOLLEN SCROTUM, PINK, UNABLE TO AUBRIE A
BASELINE FOR SIZE. PT STATES 6/10 PAIN TO SITE. 20G TO LFA/SL FLUSHED WITHOUT
DIFFICULTY NOTED. AFEBRILE, PT ORIENTED TO ROOM, UNIT AND CAll light. CALL
LIGHT IN REACH. WILL MONITOR.

## 2020-09-28 NOTE — NUR
REPORT HAS BEEN RECEIVED FROM DAY NURSE. PT APPEARS TO BE IN STABLE CONDITION.
URINAL EMPTIED OF 400CC OF CLEAR YELLOW URINE. PT C/O FEELING COLD, WARMED
BLANKET PROVIDED. PT DENIES ANY OTHER NEEDS AT THIS TIME. PT POSITIONED ON
LEFT SIDE IN LOW FOWLERS POSITION. PT LOCX4 AND POC DISCUSSED.

## 2020-09-28 NOTE — NUR
PT STATES THAT HIS TESTICLES ARE SWOLLEN TO THE SIZE OF GOLF BALLS. HAS BEEN
TREATED PREVIOUSLY FOR HYDROCEL. PT IS AOX4. STATES THAT THE PAIN HAS BEEN
GETTING WORSE EVEN WITH TAKING ANTIBIOTICS.

## 2020-09-28 NOTE — NUR
PT MEDICATED AS ORDERS PROVIDE, REFUSED HIS LOVONOX SHOT AT THIS TIME.V/S
ASSESSED AND ACCU-CHECK OBTAINED 137.  PT REPOSITIONED IN THE BED AND
ASSESSMENT COMPLETED AT THIS TIME. MILD AMOUNT OF EDEMA OBSERVED TO SCROTUM,
PT REPORTS HAVING HAD LUQ ABD TENDERNESS AND PAIN IN HIS LOWER BACK OFF AND ON
FOR THE LAST 3 DAYS. REPORTS HAVING HAD DRY NON-PRODUCTIVE COUGH EVER SINCE
ORIGINALLY BEING DIAGNOSED WITH COVID. ASSISTED PT W/PO FLUIDS AND BSC PLACED
AT BEDSIDE AND PT ENCOURAGED TO CALL IF HE NEEDS TO GET UP DUE TO REPORTED
WEAKNESS BY PT. CALL LIGHT W/IN REACH.

## 2020-09-28 NOTE — NUR
PT RESTING ON STRETCHER, INFORMED OF CONTINUED WAIT TIME. PT STATES THAT THE
PAIN IS BEGINING TO INCREASE.

## 2020-09-28 NOTE — NUR
I HEARD SOUND COMING FROM PT'S ROOM. HE WAS JUST FINISHING USING THE URINAL IN
BED. HE REPORTED HAVING A GREAT AMOUNT OF PAIN WHEN HE URINATES. I OFFERED
ASSISTANCE POSITIONING, COOL PACKS OR WARM PACKS. V/S ASSESSED AT THIS TIME.
PT DENIED COMFORT MEASURES OFFERED. WILL PROVIDE MEDICATION AS ORDERS PROVIDE
WHEN AVAILABLE. UPON LEAVING THE ROOM PT APPEARED MUCH MORE COMFORTABLE AND
RELAXED. DENIED ANY OTHER NEEDS AT THIS TIME.

## 2020-09-28 NOTE — NUR
Admission Note
 
Report Given to:         GALINDO DAO
Transported by:           Wheelchair          X Stretcher
 
Transported with:        X Nurse     Transporter   X Patent IV    O2
                         X Cardiac Monitor
 
Location:                X ICU       [facility]
 
 
          
 
         TRANSPORTED TO ROOM WITHOUT INCIDENT

## 2020-09-29 VITALS — DIASTOLIC BLOOD PRESSURE: 78 MMHG | SYSTOLIC BLOOD PRESSURE: 142 MMHG

## 2020-09-29 VITALS — DIASTOLIC BLOOD PRESSURE: 55 MMHG | SYSTOLIC BLOOD PRESSURE: 118 MMHG

## 2020-09-29 VITALS — SYSTOLIC BLOOD PRESSURE: 121 MMHG | DIASTOLIC BLOOD PRESSURE: 51 MMHG

## 2020-09-29 VITALS — SYSTOLIC BLOOD PRESSURE: 132 MMHG | DIASTOLIC BLOOD PRESSURE: 68 MMHG

## 2020-09-29 VITALS — SYSTOLIC BLOOD PRESSURE: 115 MMHG | DIASTOLIC BLOOD PRESSURE: 49 MMHG

## 2020-09-29 VITALS — SYSTOLIC BLOOD PRESSURE: 125 MMHG | DIASTOLIC BLOOD PRESSURE: 59 MMHG

## 2020-09-29 LAB
ALBUMIN SERPL-MCNC: 2.9 G/DL (ref 3.2–5)
ALP SERPL-CCNC: 123 U/L (ref 38–126)
ANION GAP SERPL CALCULATED.3IONS-SCNC: 15 MMOL/L
AST SERPL-CCNC: 15 U/L (ref 19–48)
BASOPHILS NFR BLD AUTO: 1 % (ref 0–3)
BUN SERPL-MCNC: 27 MG/DL (ref 8–23)
BUN/CREAT SERPL: 21
CHLORIDE SERPL-SCNC: 99 MMOL/L (ref 95–108)
CO2 SERPL-SCNC: 16 MMOL/L (ref 22–30)
CREAT SERPL-MCNC: 1.3 MG/DL (ref 0.7–1.3)
EOSINOPHIL NFR BLD AUTO: 1 % (ref 0–8)
ERYTHROCYTE [DISTWIDTH] IN BLOOD BY AUTOMATED COUNT: 14.6 % (ref 11.5–15.5)
HCT VFR BLD AUTO: 29.3 % (ref 39–50)
HGB BLD-MCNC: 9.2 G/DL (ref 14–18)
IMM GRANULOCYTES NFR BLD: 1.9 % (ref 0–5)
LYMPHOCYTES NFR BLD: 20 % (ref 15–41)
MCH RBC QN AUTO: 24.9 PG  CALC (ref 26–32)
MCHC RBC AUTO-ENTMCNC: 31.4 G/DL CAL (ref 32–36)
MCV RBC AUTO: 79.2 FL  CALC (ref 80–100)
MONOCYTES NFR BLD AUTO: 13 % (ref 2–13)
NEUTROPHILS # BLD AUTO: 2.34 THOU/UL (ref 1.82–7.42)
NEUTROPHILS NFR BLD AUTO: 64 % (ref 42–76)
PLATELET # BLD AUTO: 185 THOU/UL (ref 130–400)
POTASSIUM SERPL-SCNC: 5 MMOL/L (ref 3.5–5.1)
PROT SERPL-MCNC: 5.6 G/DL (ref 6.3–8.2)
RBC # BLD AUTO: 3.7 MILL/UL (ref 4.7–6.1)
SODIUM SERPL-SCNC: 125 MMOL/L (ref 137–146)

## 2020-09-29 NOTE — NUR
V/S ASSESSED, PT APPEARS STABLE AND SLEEPING AT THIS TIME. NO S/O DISTRESS
NOTED. CALL LIGHT W/IN REACH.

## 2020-09-29 NOTE — NUR
RECEIVED REPORT FROM NURSE DIXON. JUDY RESTING IN BED EYS CLOSED, NO
DISCOMFORTS NOTED AT THIS TIME, CALL LIGHT AT REACH.

## 2020-09-29 NOTE — NUR
PATIENT ALERT ORINETED ABLE TO MAKE NEEDS KNONW, STANDBY ASSIST WITH TRANSFER
R/T VISION PROBLEM, WITH SALINE LOCK ON LFA PATENT FLUSHES WELL, REMAINS ON
TELE ATRIAL FLUTTER 64, DENIES CHEST DISCOMFORTS, C/O PAIN ON SCROTAL AREA
PREVIUOSLY MEDICATED, LBM 9/29, , WILL CONTINUE MONITORING.

## 2020-09-29 NOTE — NUR
PT TRANSFER FROM ICU  TO [facility] ROOM 263 VIA WHEELCHAIR IN STABLE CONDITION
ACCOMPAINED BY ICU STAFF. PT AMBULATED FROM WHEELCHAIR TO BED WITH STEADY
GAIT. INTRODUCED SELF TO PT AND DISCUSSED POC. PT IS A/O X3. ASSESSMENT AND
VITALS COMPLETED AT THIS TIME. RESPIRATIONS ARE EVEN AND UNLABORED ON ROOM
AIR. LUNG SOUNDS ARE CLEAR. HEART RHYTHM IS NORMAL, PT PLACED ON TELE. BOWEL
SOUNDS ARE ACTIVE. LAST REPORTED BM 09/29/2020. RADIAL PULSES ARE STRONG WITH
NORMAL CAPILLARY REFILL. PEDAL PULSES WEAK. #20G IN LFA FLUSHED, SITE
APPEARS HEALTHY AND PATENT. SKIN IS WARM AND DRY WITH NO BREAKDOWN. TRACE
EDEMA NOTED TO BLE.PT DENIES OF ANY PAIN OR DISCOMFORTS AT THIS TIME.PT
ORIENTED TO ROOM AND CALL LIGHT SYSTEM. ALL SFAETY PRECAUTIONS ARE IN PLACE
WITH CALL LIGHT IN REACH. WILL CONTINUE TO MONITOR.

## 2020-09-29 NOTE — NUR
REASSESSMENT OF PAIN AT THIS TIME RESUTLING IN 5/10. PT STATES THAT TYLENOL IS
HELPING. RESPIRATIONS ARE EVEN AND UNLABORED WITH NO DISTRESS NOTED. PILLOW
PLACE UNDER SCROTUM TO ASSIST WITH PAIN. ALL SFAETY PRECAUTIONS REMAIN IN
PLACE. WILL CONTINUE TO MOITOR

## 2020-09-29 NOTE — NUR
RECEIVED A PHONE CALL FROM ED THAT PATIENT HR WAS 30'S ND IMMIDIATELY BACK ON
THE 50'S, PATIENT CPOMFORTABLY RESTING IN BED, APPEARS TO BE SLEEPING EASY TO
AROUSE, DENIES CHEST DISCOMFORTS CALL LIGHT AT REACH, WILL ONTINU TO MONITOR,

## 2020-09-29 NOTE — NUR
PT SLEEPING, BUT AWOKE TO MY ENTERING THE ROOM AND PROMPTLY RETURNED BACK TO
SLEEP. NO S/O DISTRESS NOTED. CALL LIGHT IS W/IN REACH OF PT.

## 2020-09-29 NOTE — NUR
REASSESSMENT OF PAIN AT THIS TIME RESUTLING IN 7/10. PT STATES TORADOL DID
HELP BUT IS STARTING TO WARE OFF. MD TO BE NOTIFIED. RESPIATIONS ARE EVEN AND
UNLABORE DIWHT NO SIGNS OF DISTRESS NOTED. ALL SAFETY PRECAUTIONS ARE IN PLACE
WIHT CALL LIGHT IN REACH. WILL CONTINUE TO MONITOR

## 2020-09-29 NOTE — NUR
PT A&0X4 , ABLE TO MAKE NEEDS KNOWN. PT REMAINS AFLUTTER WITH AVB ON
TELEMETRY, HR 66. PT DENIES CP, SOB OR DISTRESS AT THIS TIME, RESPIRATIONS
EVEN/UNLABORED, SA02 100% RA. ABDOMEN SOFT, NON-TENDER LBM 9-29-20.. PT
CONTINUES TO STATE THROBBING AND TENDERNESS AT SCROTOM. CALL LIGHT IN REACH.
WILL MONITOR.

## 2020-09-29 NOTE — NUR
pt called to report that he had gotten up to bsc for bm.  Reported that he
couldn't wait for assistance. 1 large watery brown stool emptied. Will
follow-up to assess v/s, pt reports having just eaten ice chips.  Pt left
resting in bed w/lights off.

## 2020-09-29 NOTE — NUR
PT COMPLAINS OF 8/10 SCROTUM PAIN. NO EDEMA OR REDNESS NOTED. RESPIRATIONS ARE
EVEN AND UNLABORED WITH NO SIGNS OF DISTRESS NOTED. TORADOL TO BE
ADMINISTERED. ALL SAFETY PRECAUTIONS ARE IN PLACE WITH CALL LIGHT IN REACH.
WILL CONTINUE TO MONITOR

## 2020-09-29 NOTE — NUR
PT COMPLAINS OF 8/10 PAIN OF SCROTUM. PT TO BE MEDICATED PER EMAR. RESPIRATION
SARE EVEN AND UNLABORED WITH NO SIGNS OF DISTRESS NOTED. ALL SAFETY
PRECAUTIONS REMAIN IN PLACE WIHT CALL LIGHT IN REACH. WILL CONTINUE TO MONITOR

## 2020-09-29 NOTE — NUR
PT note:
Patient is screened for PT intervention and may benefit from our services to
address
dizziness and weakness

## 2020-09-29 NOTE — NUR
PT C/O NAUSEA, MEDICATED AS ORDERS PROVIDE. GINGERALE/DIET, ICE-CHIPS AND
CRACKERS PROVIDED AT BEDSIDE. PT APPEARANCE IS VERY PALE. WILL CONTINUE TO
MONITOR AND HAVE ENCOURAGED PT TO CALL IF SYMPTOMS WORSEN OR ANY OTHER NEEDS
ARISE. CALL LIGHT AT BEDSIDE.

## 2020-09-30 VITALS — DIASTOLIC BLOOD PRESSURE: 80 MMHG | SYSTOLIC BLOOD PRESSURE: 143 MMHG

## 2020-09-30 VITALS — DIASTOLIC BLOOD PRESSURE: 78 MMHG | SYSTOLIC BLOOD PRESSURE: 143 MMHG

## 2020-09-30 VITALS — SYSTOLIC BLOOD PRESSURE: 144 MMHG | DIASTOLIC BLOOD PRESSURE: 82 MMHG

## 2020-09-30 VITALS — SYSTOLIC BLOOD PRESSURE: 135 MMHG | DIASTOLIC BLOOD PRESSURE: 66 MMHG

## 2020-09-30 VITALS — DIASTOLIC BLOOD PRESSURE: 75 MMHG | SYSTOLIC BLOOD PRESSURE: 136 MMHG

## 2020-09-30 LAB
ANION GAP SERPL CALCULATED.3IONS-SCNC: 14 MMOL/L
BASOPHILS NFR BLD AUTO: 1 % (ref 0–3)
BUN SERPL-MCNC: 30 MG/DL (ref 8–23)
BUN/CREAT SERPL: 22
CHLORIDE SERPL-SCNC: 99 MMOL/L (ref 95–108)
CO2 SERPL-SCNC: 17 MMOL/L (ref 22–30)
CREAT SERPL-MCNC: 1.4 MG/DL (ref 0.7–1.3)
EOSINOPHIL NFR BLD AUTO: 1 % (ref 0–8)
ERYTHROCYTE [DISTWIDTH] IN BLOOD BY AUTOMATED COUNT: 14.9 % (ref 11.5–15.5)
HCT VFR BLD AUTO: 31.6 % (ref 39–50)
HGB BLD-MCNC: 10 G/DL (ref 14–18)
IMM GRANULOCYTES NFR BLD: 1.7 % (ref 0–5)
LYMPHOCYTES NFR BLD: 24 % (ref 15–41)
MCH RBC QN AUTO: 25.1 PG  CALC (ref 26–32)
MCHC RBC AUTO-ENTMCNC: 31.6 G/DL CAL (ref 32–36)
MCV RBC AUTO: 79.2 FL  CALC (ref 80–100)
MONOCYTES NFR BLD AUTO: 13 % (ref 2–13)
NEUTROPHILS # BLD AUTO: 2.09 THOU/UL (ref 1.82–7.42)
NEUTROPHILS NFR BLD AUTO: 61 % (ref 42–76)
PLATELET # BLD AUTO: 182 THOU/UL (ref 130–400)
POTASSIUM SERPL-SCNC: 5.5 MMOL/L (ref 3.5–5.1)
RBC # BLD AUTO: 3.99 MILL/UL (ref 4.7–6.1)
SODIUM SERPL-SCNC: 124 MMOL/L (ref 137–146)

## 2020-09-30 PROCEDURE — 0T9B70Z DRAINAGE OF BLADDER WITH DRAINAGE DEVICE, VIA NATURAL OR ARTIFICIAL OPENING: ICD-10-PCS | Performed by: INTERNAL MEDICINE

## 2020-09-30 NOTE — NUR
CALLED DR. ANDRADE MADE AWARE OF PATIENT URINE OUTPUT ONLY 50CC, MADE AWARE
THAT BLADDER SCAN RESULT 122ML, PITTING EDEMA +1 ON BILAT LOWER EXTREMITY ALSO
MADE AWARE OF LOOSE BM 9/29 400CC AS PER DOCUMENT, STATED HE WILL MAKE DR. GARDINER AWARE.

## 2020-09-30 NOTE — NUR
CALLED DR. JOSHUA [phone] SPOKE TO IRENE GAVE PT INFO AND THE
WRITER WAS TOLD IRENE WOULD GIVE DR. JOSHUA INFO ON THE PT .

## 2020-09-30 NOTE — NUR
CALLED DR. STEPHENS [phone] SPOKE TO ZORAIDA GAVE PATIENT INFO WRITER
WAS TOLD ZORAIDA WOULD GIVE INFO RO DR. STEPHENS .

## 2020-09-30 NOTE — NUR
PLACED A #16 [place] LOPEZ IN PT . IMMEDIATE OF YELLOW URINE. 346ML CONTINEU TO
OBSERVE AND MONITOR.

## 2020-09-30 NOTE — NUR
PT WAS AMBULATING TO THE SINK, INQUIRED HOW HE WAS DOING" WHEN I WS LAYING
DOWN , I COULD FEEL WHEEZING," ENCOURAGED TO SIT IN THE CHAIR,

## 2020-09-30 NOTE — NUR
ASSESSMENT IS COMPLETED: IV SITE IS FREE FROM REDNESS OR EDEMA. HR IS
REG,PULSES ARE STRONG X4, AB DIS SOFT WITH ACTIVE BS. BREATH SOUNDS ARE
CLEAR,BILATERALLY, ABD IS DISTENDED AND SOFT C/O TENDERNESS, SCROTUM IS
SLIGHTLY SWOLLEN. +1 PITTING EDEMA NOTED ON BILAT FEET. TELE  MONTIOR IN
PLACE. C/O UNABLE TO URINATE.

## 2020-09-30 NOTE — NUR
PATIENT C/O SHARP CHEST PAIN FEELS LIKE REFLUX HE STATED, PS 7/10, ALSO C/O OF
NAUSEA, V/S TAKEN AND RECORDED, STAT EKG ORDERED, CALLED  MADE AWARE
WITH ORDERS MADE, SENT TO PHARMACY.

## 2020-09-30 NOTE — NUR
PATIENT APPEARS TO BE SLEPPING WITH EYES CLOSED, BREATHING EVEN UNALBORED,
CALL LIGHT AT REACH. NO DISCOMFORTS NOTED AT THIS TIME.

## 2020-09-30 NOTE — NUR
PATIENT URINE OUTPUT LINDA COLOR 50CC, DIMINISHED LUNG SOUNDS, TRACE EDEMA ON
BLE, BLADDER NOT DISTENDED BLADDER SCAN 122CC, WILL NOTIFY MD.

## 2020-09-30 NOTE — NUR
PT SITTING UP IN BED, RETCHING. 100ML BROWN LIQUID EMESIS NOTED. PRN ZOFRAN
AND REGLAN ADMINISTERED. GINGERALE PROVIDED WITH INSTRUCTIONS FOR SMALL SIPS
AS TOLERATED. PT AGREES HE FEELS ABLE TO TOLERATE SCHEDULED MEDICATIONS AFTER
AINT-EMETIC. SCHEDULED MEDICATION AND PRN TYLENOL ADMINISTERED. PT STATES HE
THINKS HE NEEDS SOMETHING STRONGER FOR PAIN. DR. ANDRADE ADVISED, PRN TRAMADOL
ORDER RECEIVED. SODIUM BICARBONATE TAB AS ORDERED UNAVAILABLE UNTIL PHARMACY
OPENS IN AM. DR. ANDRADE ADVISED. NO CHANGES OR FURTHER ORDERS. PLAN OF CARE
REVIEWED. PT VERBALIZES UNDERSTANDING, DENIES QUESTIONS. DENIES FURTHER NEEDS
AT THHIS TIME. CALL BELL WITHIN REACH, AGREES TO CALL PRN.

## 2020-09-30 NOTE — NUR
PT APPEARS TO BE SLEEPING COMFORTABLY. RESPIRATIONS REGUAL AND UNLABORED. NO
APPARENT DISTRESS. CALL GREENFIELD REMAINS WITHIN REACH.

## 2020-10-01 VITALS — DIASTOLIC BLOOD PRESSURE: 68 MMHG | SYSTOLIC BLOOD PRESSURE: 148 MMHG

## 2020-10-01 VITALS — SYSTOLIC BLOOD PRESSURE: 174 MMHG | DIASTOLIC BLOOD PRESSURE: 88 MMHG

## 2020-10-01 VITALS — SYSTOLIC BLOOD PRESSURE: 167 MMHG | DIASTOLIC BLOOD PRESSURE: 80 MMHG

## 2020-10-01 VITALS — SYSTOLIC BLOOD PRESSURE: 136 MMHG | DIASTOLIC BLOOD PRESSURE: 64 MMHG

## 2020-10-01 VITALS — DIASTOLIC BLOOD PRESSURE: 81 MMHG | SYSTOLIC BLOOD PRESSURE: 180 MMHG

## 2020-10-01 VITALS — DIASTOLIC BLOOD PRESSURE: 79 MMHG | SYSTOLIC BLOOD PRESSURE: 170 MMHG

## 2020-10-01 LAB
ALBUMIN SERPL-MCNC: 2.9 G/DL (ref 3.2–5)
ALP SERPL-CCNC: 118 U/L (ref 38–126)
ANION GAP SERPL CALCULATED.3IONS-SCNC: 16 MMOL/L
AST SERPL-CCNC: 14 U/L (ref 19–48)
BASOPHILS NFR BLD AUTO: 1 % (ref 0–3)
BUN SERPL-MCNC: 29 MG/DL (ref 8–23)
BUN/CREAT SERPL: 22
CHLORIDE SERPL-SCNC: 99 MMOL/L (ref 95–108)
CO2 SERPL-SCNC: 17 MMOL/L (ref 22–30)
CREAT SERPL-MCNC: 1.3 MG/DL (ref 0.7–1.3)
EOSINOPHIL NFR BLD AUTO: 1 % (ref 0–8)
ERYTHROCYTE [DISTWIDTH] IN BLOOD BY AUTOMATED COUNT: 14.9 % (ref 11.5–15.5)
HCT VFR BLD AUTO: 31.2 % (ref 39–50)
HGB BLD-MCNC: 9.8 G/DL (ref 14–18)
IMM GRANULOCYTES NFR BLD: 2.4 % (ref 0–5)
LYMPHOCYTES NFR BLD: 22 % (ref 15–41)
MCH RBC QN AUTO: 25.1 PG  CALC (ref 26–32)
MCHC RBC AUTO-ENTMCNC: 31.4 G/DL CAL (ref 32–36)
MCV RBC AUTO: 80 FL  CALC (ref 80–100)
MONOCYTES NFR BLD AUTO: 13 % (ref 2–13)
NEUTROPHILS # BLD AUTO: 2.07 THOU/UL (ref 1.82–7.42)
NEUTROPHILS NFR BLD AUTO: 62 % (ref 42–76)
PLATELET # BLD AUTO: 168 THOU/UL (ref 130–400)
POTASSIUM SERPL-SCNC: 5.7 MMOL/L (ref 3.5–5.1)
PROT SERPL-MCNC: 5.7 G/DL (ref 6.3–8.2)
RBC # BLD AUTO: 3.9 MILL/UL (ref 4.7–6.1)
SODIUM SERPL-SCNC: 126 MMOL/L (ref 137–146)

## 2020-10-01 NOTE — NUR
PT note
Patient is a good short term rehabilitation candidate. His Am Pac today is 12
given his poor endurance and inconcisitency with safety as well as fall risk.
He is ambulating only in room distance on a level surface with AD. Multiple
surfaces increase his fall risk. I have spoken with PTA and OT who both concur
and give his Am Pac a rating that puts him squarely in need of short term
rehabilitation to address the above issues.

## 2020-10-01 NOTE — NUR
ULTRAM ADMINISTERED FOR C/O OF PAIN, SEE E-MAR. PT DENIES FURTHER NEEDS. CALL
BELL WITHIN REACH, AGREES TO CALL PRN.

## 2020-10-01 NOTE — NUR
PRN ANTIEMETIC AND APAP ADMINISTERED PER PTS REQUEST. SEE E-MAR. REPORTS
NAUSEA, NO EMESIS. DENIES FURTHER NEEDS. CALL BELL WITHIN REACH, AGREES TO
CALL PRN.

## 2020-10-01 NOTE — NUR
PHYSICAL ASSESMENT COMPLETE. PT REPORTS SCROTAL DISCOMFORT AND REQUEST
ANALGESIC. REPORTS NAUSEA AND REQUEST ANTIEMETIC. SEE E-MAR FOR ADMINISTRATION
OF SCHEDULED AND PRN MEDICATIONS. PT DENIES FURTHER NEEDS AT THIS TIME. PLAN
OF CARE REVIEWED, PT DENIES QUESTIONS, VERBALIZES UNDERSTANDING. ITEMS WITHIN
REACH, BED LOCKED IN LOW POSITION W/ BEDRAILS UP X2. CALL BELL WITHIN REACH,
AGREES TO CALL PRN.

## 2020-10-01 NOTE — NUR
pt was sitting in recliner upon entering room. He agreed to gait
training with RW. Ambulated 35ft x2 then reported he was SOB and his nurse was
notified. He is a good candidate for rehab indicated by [facility] score of 12

## 2020-10-01 NOTE — NUR
PT LAYING IN BED. A&O X3. NO DISTRESS NOTED. LOPEZ CATHETER IN PLACE DRAINING
VIA GRVITY WITH CLEAR YELLOW URINE NOTED. ENCOURAGED PT TO SIT IN CHAIR FOR
LUNCH, PT AGREED. PE PT HIS APPETITIE HAS DECREASED, LEVEMIR HELD THIS MORNING
DUE TO BLOOD SUGAR BEING 108 AND DECREASED APPETITE.  PT C/O OF PAIN IN GROIN
AREA, NO EDEMA NOTED AT THIS TIME. ASSESSMENT COMPLETED.  DISCUSSED POC. CALL
LIGHT IN REACH.  CONTINUE TO MONITOR.

## 2020-10-02 VITALS — DIASTOLIC BLOOD PRESSURE: 79 MMHG | SYSTOLIC BLOOD PRESSURE: 142 MMHG

## 2020-10-02 VITALS — DIASTOLIC BLOOD PRESSURE: 70 MMHG | SYSTOLIC BLOOD PRESSURE: 125 MMHG

## 2020-10-02 VITALS — SYSTOLIC BLOOD PRESSURE: 165 MMHG | DIASTOLIC BLOOD PRESSURE: 73 MMHG

## 2020-10-02 VITALS — DIASTOLIC BLOOD PRESSURE: 67 MMHG | SYSTOLIC BLOOD PRESSURE: 117 MMHG

## 2020-10-02 LAB
ALBUMIN SERPL-MCNC: 3.1 G/DL (ref 3.2–5)
ALBUMIN SERPL-MCNC: 3.1 G/DL (ref 3.2–5)
ALP SERPL-CCNC: 123 U/L (ref 38–126)
ANION GAP SERPL CALCULATED.3IONS-SCNC: 14 MMOL/L
AST SERPL-CCNC: 15 U/L (ref 19–48)
BASOPHILS NFR BLD AUTO: 1 % (ref 0–3)
BUN SERPL-MCNC: 25 MG/DL (ref 8–23)
BUN SERPL-MCNC: 25 MG/DL (ref 8–23)
BUN/CREAT SERPL: 20
CHLORIDE SERPL-SCNC: 99 MMOL/L (ref 95–108)
CHLORIDE SERPL-SCNC: 99 MMOL/L (ref 95–108)
CO2 SERPL-SCNC: 20 MMOL/L (ref 22–30)
CO2 SERPL-SCNC: 21 MMOL/L (ref 22–30)
CREAT SERPL-MCNC: 1.2 MG/DL (ref 0.7–1.3)
CREAT SERPL-MCNC: 1.3 MG/DL (ref 0.7–1.3)
EOSINOPHIL NFR BLD AUTO: 1 % (ref 0–8)
ERYTHROCYTE [DISTWIDTH] IN BLOOD BY AUTOMATED COUNT: 15.1 % (ref 11.5–15.5)
HCT VFR BLD AUTO: 30.3 % (ref 39–50)
HGB BLD-MCNC: 9.5 G/DL (ref 14–18)
IMM GRANULOCYTES NFR BLD: 1.8 % (ref 0–5)
LYMPHOCYTES NFR BLD: 18 % (ref 15–41)
MCH RBC QN AUTO: 24.9 PG  CALC (ref 26–32)
MCHC RBC AUTO-ENTMCNC: 31.4 G/DL CAL (ref 32–36)
MCV RBC AUTO: 79.5 FL  CALC (ref 80–100)
MONOCYTES NFR BLD AUTO: 13 % (ref 2–13)
NEUTROPHILS # BLD AUTO: 2.18 THOU/UL (ref 1.82–7.42)
NEUTROPHILS NFR BLD AUTO: 66 % (ref 42–76)
PLATELET # BLD AUTO: 174 THOU/UL (ref 130–400)
POTASSIUM SERPL-SCNC: 5.5 MMOL/L (ref 3.5–5.1)
PROT SERPL-MCNC: 5.9 G/DL (ref 6.3–8.2)
RBC # BLD AUTO: 3.81 MILL/UL (ref 4.7–6.1)
SODIUM SERPL-SCNC: 127 MMOL/L (ref 137–146)
SODIUM SERPL-SCNC: 128 MMOL/L (ref 137–146)

## 2020-10-02 NOTE — NUR
PT SITTING UP IN CHAIR; ALERT AND OREINTED. C/O PAIN WITH COUGH; STATES THAT
IT FEELS "LIKE SOMEONE IS PUNCHING ME IN THE RIBS." ALSO C/O SCROTUM
TENDERNESS. RESPIRATIONS EVEN AND UNLABORED ON ROOM AIR. TRACE EDEMA TO
ANKLES; FEET ELEVATED. IV SITE APPEARS HEATLHY AND FLUSHES. PLAN OF CARE
REVIEWED. PT ENCOURAGED TO VERBALIZE CONCERNS. STATES UNDERSTANDING. SAFETY
MEASURES IN PLACE. CALL LIGHT WITHIN REACH.

## 2020-10-02 NOTE — NUR
Discharge instructions given. Patient verbalizes understanding of same.
Discharged in stable condition via [facility] to [place].  All belongings sent with pt.

## 2020-10-02 NOTE — NUR
ENEMA ADMINISTERED WITH POSITIVE RESULTS; PT UP TO BSC FOR MEDIUM BOWEL
MOVEMENT OF HARD AND SOFT CONSISTENCY; PAIN IS REDUCED AFTER BM; NOW REPORTS
8/10 LOWER ABDOMINAL/GROIN PAIN. ULTRAM GIVEN AT THIS TIME. MAG CITATE ALSO
ORDERED X 1 BOTTLE PRIOR TO DISCHARGE TO [facility]. PT SITTING UP ON EDGE
OF BED; TALKATIVE AND LAUGHING. UPDATED ON POC. CALL LIGHT WITHIN REACH.

## 2020-10-02 NOTE — NUR
ASSESMENT UNCHANGED FROM BEGINING OF SHIFT BASELINE ASSESMENT. AM HEMODYNAMICS
WITHIN BASELINE.PT AFEBRILE. PT DENIES NEEDS AT THIS TIME. ITEMS REMAIN
WITHIN REACH, BED REMAINS LOCKED IN LOW POSITION W/ BEDRAILS UP X2.CALL BELL
REMAINS WITHIN REACH, AGREES TO CALL PRN.

## 2020-10-02 NOTE — NUR
PT C/O SEVERE PAIN TO LOWER ABDOMEN AND IS TEARFUL. ABDOMEN IS DISTENDED AND
SOFT; BS ACTIVE X 4. SUPPOSITORIES WITH NO EFFECT; PT UNABLE TO HAVE BOWEL
MOVEMENT. ARNP NOTIFIED; NEW ORDER FOR SOAP NOHEMI ENEMA.

## 2020-10-02 NOTE — NUR
PT APPEARS TO BE SLEEPING COMFORTABLY, NO APPARENT DISTRESS, RESPIRATIONS
REGULAR AND UNLABORED. ITEMS REMAIN WITHIN REACH, BED REMAINS LOCKED IN LOW
POSITION W/ BEDRAILS UP X2. CALL GREENFIELD REMAINS WITHIN REACH.

## 2020-10-02 NOTE — NUR
DR. GARDINER AND ARNP AT BEDSIDE. TO DISCUSS POC. NEW ORDER FOR SCHEDULED
DULCOLAX SUPPOSITORIES. PT UNABLE TO HAVE BM SINCE 9/29.

## 2020-10-02 NOTE — NUR
SUPPOSITORY ADMINISTERED. TYLENOL ALSO GIVEN AT THIS TIME FOR 8/10 GROIN PAIN.
BSC PLACED AT BEDSIDE PER REQUEST.

## 2021-01-04 ENCOUNTER — HOSPITAL ENCOUNTER (EMERGENCY)
Dept: HOSPITAL 82 - ED | Age: 61
Discharge: ANOTHER HOSPITAL | End: 2021-01-04
Payer: MEDICARE

## 2021-01-04 VITALS — WEIGHT: 300.71 LBS | BODY MASS INDEX: 40.73 KG/M2 | HEIGHT: 72 IN

## 2021-01-04 VITALS — SYSTOLIC BLOOD PRESSURE: 169 MMHG | DIASTOLIC BLOOD PRESSURE: 71 MMHG

## 2021-01-04 DIAGNOSIS — Z95.5: ICD-10-CM

## 2021-01-04 DIAGNOSIS — Z95.2: ICD-10-CM

## 2021-01-04 DIAGNOSIS — Z79.4: ICD-10-CM

## 2021-01-04 DIAGNOSIS — I48.92: ICD-10-CM

## 2021-01-04 DIAGNOSIS — I13.2: Primary | ICD-10-CM

## 2021-01-04 DIAGNOSIS — Z86.16: ICD-10-CM

## 2021-01-04 DIAGNOSIS — Z20.822: ICD-10-CM

## 2021-01-04 DIAGNOSIS — C85.90: ICD-10-CM

## 2021-01-04 DIAGNOSIS — I50.9: ICD-10-CM

## 2021-01-04 DIAGNOSIS — Z95.1: ICD-10-CM

## 2021-01-04 DIAGNOSIS — Z99.2: ICD-10-CM

## 2021-01-04 DIAGNOSIS — I48.91: ICD-10-CM

## 2021-01-04 DIAGNOSIS — Z86.73: ICD-10-CM

## 2021-01-04 DIAGNOSIS — E11.22: ICD-10-CM

## 2021-01-04 DIAGNOSIS — N18.6: ICD-10-CM

## 2021-01-04 LAB
ALBUMIN SERPL-MCNC: 3.4 G/DL (ref 3.2–5)
ALP SERPL-CCNC: 107 U/L (ref 38–126)
ANION GAP SERPL CALCULATED.3IONS-SCNC: 11 MMOL/L
AST SERPL-CCNC: 20 U/L (ref 19–48)
BASOPHILS NFR BLD AUTO: 1 % (ref 0–3)
BUN SERPL-MCNC: 27 MG/DL (ref 8–23)
BUN/CREAT SERPL: 13
CHLORIDE SERPL-SCNC: 102 MMOL/L (ref 95–108)
CO2 SERPL-SCNC: 29 MMOL/L (ref 22–30)
CREAT SERPL-MCNC: 2.1 MG/DL (ref 0.7–1.3)
EOSINOPHIL NFR BLD AUTO: 1 % (ref 0–8)
ERYTHROCYTE [DISTWIDTH] IN BLOOD BY AUTOMATED COUNT: 17.8 % (ref 11.5–15.5)
HCT VFR BLD AUTO: 32.2 % (ref 39–50)
HGB BLD-MCNC: 9.1 G/DL (ref 14–18)
IMM GRANULOCYTES NFR BLD: 0.5 % (ref 0–5)
LYMPHOCYTES NFR BLD: 18 % (ref 15–41)
MCH RBC QN AUTO: 23.5 PG  CALC (ref 26–32)
MCHC RBC AUTO-ENTMCNC: 28.3 G/DL CAL (ref 32–36)
MCV RBC AUTO: 83.2 FL  CALC (ref 80–100)
MONOCYTES NFR BLD AUTO: 9 % (ref 2–13)
MYOGLOBIN SERPL-MCNC: 139 NG/ML (ref 0–121)
NEUTROPHILS # BLD AUTO: 3.15 THOU/UL (ref 1.82–7.42)
NEUTROPHILS NFR BLD AUTO: 72 % (ref 42–76)
PLATELET # BLD AUTO: 164 THOU/UL (ref 130–400)
POTASSIUM SERPL-SCNC: 3.8 MMOL/L (ref 3.5–5.1)
PROT SERPL-MCNC: 6.8 G/DL (ref 6.3–8.2)
RBC # BLD AUTO: 3.87 MILL/UL (ref 4.7–6.1)
SODIUM SERPL-SCNC: 138 MMOL/L (ref 137–146)

## 2021-02-28 ENCOUNTER — HOSPITAL ENCOUNTER (INPATIENT)
Dept: HOSPITAL 82 - ED | Age: 61
LOS: 6 days | Discharge: HOME HEALTH | DRG: 291 | End: 2021-03-06
Attending: INTERNAL MEDICINE | Admitting: INTERNAL MEDICINE
Payer: MEDICARE

## 2021-02-28 VITALS — HEIGHT: 71 IN | WEIGHT: 244.71 LBS | BODY MASS INDEX: 34.26 KG/M2

## 2021-02-28 VITALS — SYSTOLIC BLOOD PRESSURE: 129 MMHG | DIASTOLIC BLOOD PRESSURE: 67 MMHG

## 2021-02-28 VITALS — SYSTOLIC BLOOD PRESSURE: 158 MMHG | DIASTOLIC BLOOD PRESSURE: 80 MMHG

## 2021-02-28 VITALS — SYSTOLIC BLOOD PRESSURE: 157 MMHG | DIASTOLIC BLOOD PRESSURE: 85 MMHG

## 2021-02-28 VITALS — DIASTOLIC BLOOD PRESSURE: 75 MMHG | SYSTOLIC BLOOD PRESSURE: 136 MMHG

## 2021-02-28 DIAGNOSIS — K21.9: ICD-10-CM

## 2021-02-28 DIAGNOSIS — E11.65: ICD-10-CM

## 2021-02-28 DIAGNOSIS — S39.012A: ICD-10-CM

## 2021-02-28 DIAGNOSIS — J18.9: ICD-10-CM

## 2021-02-28 DIAGNOSIS — D63.1: ICD-10-CM

## 2021-02-28 DIAGNOSIS — R53.1: ICD-10-CM

## 2021-02-28 DIAGNOSIS — Z86.16: ICD-10-CM

## 2021-02-28 DIAGNOSIS — Z87.891: ICD-10-CM

## 2021-02-28 DIAGNOSIS — N40.1: ICD-10-CM

## 2021-02-28 DIAGNOSIS — Z92.3: ICD-10-CM

## 2021-02-28 DIAGNOSIS — E87.1: ICD-10-CM

## 2021-02-28 DIAGNOSIS — Z86.73: ICD-10-CM

## 2021-02-28 DIAGNOSIS — I13.0: Primary | ICD-10-CM

## 2021-02-28 DIAGNOSIS — J96.21: ICD-10-CM

## 2021-02-28 DIAGNOSIS — I50.23: ICD-10-CM
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
## 2021-02-28 DIAGNOSIS — Z95.5: ICD-10-CM

## 2021-02-28 DIAGNOSIS — Z20.822: ICD-10-CM

## 2021-02-28 DIAGNOSIS — E11.22: ICD-10-CM

## 2021-02-28 DIAGNOSIS — N17.9: ICD-10-CM

## 2021-02-28 DIAGNOSIS — N18.30: ICD-10-CM
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
## 2021-02-28 DIAGNOSIS — Z95.3: ICD-10-CM

## 2021-02-28 DIAGNOSIS — Z91.81: ICD-10-CM

## 2021-02-28 DIAGNOSIS — I48.91: ICD-10-CM

## 2021-02-28 DIAGNOSIS — I25.118: ICD-10-CM

## 2021-02-28 DIAGNOSIS — Z92.21: ICD-10-CM

## 2021-02-28 DIAGNOSIS — Z79.4: ICD-10-CM

## 2021-02-28 DIAGNOSIS — Z95.1: ICD-10-CM

## 2021-02-28 DIAGNOSIS — E87.5: ICD-10-CM

## 2021-02-28 DIAGNOSIS — I48.92: ICD-10-CM

## 2021-02-28 DIAGNOSIS — Z85.819: ICD-10-CM

## 2021-02-28 DIAGNOSIS — E11.40: ICD-10-CM

## 2021-02-28 DIAGNOSIS — R33.8: ICD-10-CM

## 2021-02-28 DIAGNOSIS — E78.5: ICD-10-CM

## 2021-02-28 LAB
ALBUMIN SERPL-MCNC: 3.6 G/DL (ref 3.2–5)
ALP SERPL-CCNC: 225 U/L (ref 38–126)
ANION GAP SERPL CALCULATED.3IONS-SCNC: 12 MMOL/L
ANION GAP SERPL CALCULATED.3IONS-SCNC: 14 MMOL/L
AST SERPL-CCNC: 36 U/L (ref 19–48)
BASOPHILS NFR BLD AUTO: 1 % (ref 0–3)
BILIRUB UR QL STRIP.AUTO: NEGATIVE
BUN SERPL-MCNC: 61 MG/DL (ref 8–23)
BUN SERPL-MCNC: 61 MG/DL (ref 8–23)
BUN/CREAT SERPL: 37
BUN/CREAT SERPL: 41
CHLORIDE SERPL-SCNC: 105 MMOL/L (ref 95–108)
CHLORIDE SERPL-SCNC: 106 MMOL/L (ref 95–108)
CO2 SERPL-SCNC: 22 MMOL/L (ref 22–30)
CO2 SERPL-SCNC: 22 MMOL/L (ref 22–30)
COLOR UR AUTO: YELLOW
CREAT SERPL-MCNC: 1.5 MG/DL (ref 0.7–1.3)
CREAT SERPL-MCNC: 1.7 MG/DL (ref 0.7–1.3)
EOSINOPHIL NFR BLD AUTO: 2 % (ref 0–8)
ERYTHROCYTE [DISTWIDTH] IN BLOOD BY AUTOMATED COUNT: 16 % (ref 11.5–15.5)
GLUCOSE UR STRIP.AUTO-MCNC: NEGATIVE MG/DL
HCT VFR BLD AUTO: 27.7 % (ref 39–50)
HGB BLD-MCNC: 8.3 G/DL (ref 14–18)
HGB UR QL STRIP.AUTO: (no result)
IMM GRANULOCYTES NFR BLD: 0.4 % (ref 0–5)
KETONES UR STRIP.AUTO-MCNC: NEGATIVE MG/DL
LEUKOCYTE ESTERASE UR QL STRIP.AUTO: NEGATIVE
LYMPHOCYTES NFR BLD: 24 % (ref 15–41)
MCH RBC QN AUTO: 26.1 PG  CALC (ref 26–32)
MCHC RBC AUTO-ENTMCNC: 30 G/DL CAL (ref 32–36)
MCV RBC AUTO: 87.1 FL  CALC (ref 80–100)
MONOCYTES NFR BLD AUTO: 11 % (ref 2–13)
NEUTROPHILS # BLD AUTO: 2.91 THOU/UL (ref 1.82–7.42)
NEUTROPHILS NFR BLD AUTO: 62 % (ref 42–76)
NITRITE UR QL STRIP.AUTO: NEGATIVE
PH UR STRIP.AUTO: 7 [PH] (ref 4.5–8)
PLATELET # BLD AUTO: 196 THOU/UL (ref 130–400)
POTASSIUM SERPL-SCNC: 6 MMOL/L (ref 3.5–5.1)
POTASSIUM SERPL-SCNC: 6.5 MMOL/L (ref 3.5–5.1)
PROT SERPL-MCNC: 7.1 G/DL (ref 6.3–8.2)
PROT UR QL STRIP.AUTO: >=300 MG/DL
RBC # BLD AUTO: 3.18 MILL/UL (ref 4.7–6.1)
RBC #/AREA URNS HPF: (no result) RBC/HPF (ref 0–5)
SODIUM SERPL-SCNC: 134 MMOL/L (ref 137–146)
SODIUM SERPL-SCNC: 134 MMOL/L (ref 137–146)
SP GR UR STRIP.AUTO: 1.02
UROBILINOGEN UR QL STRIP.AUTO: 0.2 E.U./DL
WBC #/AREA URNS HPF: (no result) WBC/HPF (ref 0–5)

## 2021-02-28 PROCEDURE — 0T9B70Z DRAINAGE OF BLADDER WITH DRAINAGE DEVICE, VIA NATURAL OR ARTIFICIAL OPENING: ICD-10-PCS | Performed by: FAMILY MEDICINE

## 2021-02-28 PROCEDURE — P9016 RBC LEUKOCYTES REDUCED: HCPCS

## 2021-02-28 NOTE — NUR
CALLED  REGARDING CONSULTATION BEING PLACED. SPOKE TO HIM PERSONALLY
AND HE STATED HE WILL SEE THE PATIENT TOMORROW.

## 2021-02-28 NOTE — NUR
PATEITN RESTING AWAITING ROOM ASSIGNMENT. PATINET STATES UNABLE TO URINATE AT
THIS TIME. PATIENT STATES BACK PAIN 4 ON 0-10 SCALE

## 2021-02-28 NOTE — NUR
PT ARRIVED VIA STRETCHER ACCOMPANIED BY STAFF. IV SITE IS OBTAINED. TELE IN
PLACE. LOPEZ DRAINING YELLOW URINE.

## 2021-02-28 NOTE — NUR
PT ASSESSMENT COMPLETED AT THIS TIME. PT IS ASKING FOR SOMETHING FOR PAIN IN
HIS BACK AND HEAD. TYELNOL PROVIDED AND PM MEDICATIONS ADMINISTERED AT THIS
TIME.  ASSISTED PT REPOSITIONING AT THIS TIME. CALL LIGHT AT SIDE AND PT
ENCOURAGED TO CALL AS NEEDS ARISE.

## 2021-02-28 NOTE — NUR
PT TO ROOM 13 BY EMS FOR FALL WHILE GOING TO THE BATHROOM.  INITIAL COMPLAINT
WAS BACK PAIN BUT WHILE IN TRANSIT PT STATED HIS HEAD WAS HURTING.

## 2021-02-28 NOTE — NUR
ASSESSMENT WAS COMPLETED IN THE ER. STAFF ASSISTED WITH TRANSPORTING PT FROM
STRETCHER TO BED , TELE MONITOR, AND LOPEZ IN PLACE. IV SITE IN PLACE.

## 2021-02-28 NOTE — NUR
PT MEDICATED FOR ITCHING. HE APPEARS CALM AT THIS TIME AND WAS ACTUALLY
APPEARED TO BE SLEEPING WHEN I ENTERED THE ROOM.

## 2021-02-28 NOTE — NUR
AFTER SEVERAL ATTEMPTS WERE MADE FOR A NEW IV SITE. ONE WAS PLACED IN RAC WITH
#22 BY EULA DAO. PT CONTINUS TO REST IN BED. WITH NO DISTRESS NOTED.

## 2021-02-28 NOTE — NUR
ASSUMED CARE. PATIENT MOVING BACK AND FORTH WITHOUT DIFFICULTY. PATIENT FACES
PAIN SCALE 0 ON 0-10 SCALE

## 2021-02-28 NOTE — NUR
PT CALLED TO REPORT ITCHING. UPON ASSESSING PT, HE WAS SCRATCHING HIS ARMS AND
LEGS. HE HAS AREAS THAT APPEAR TO HAVE BEEN PREIOUSLY SCRATHCED RAW AND
SCABBED OVER. HE REPORTS THAT HIS ABD IS ITCHING ALSO, UPON ASSESSING ABD, NO
RASH OR HIVES ARE VISUALIZED AT THIS TIME. PT REPORTS THAT HE GETS THAT
EVERYTIME HE DRINKS ANYTHING WITH CAFFIENE, I ASKED IF HE HAD CAFFIENE
EARLIER, HE REPLIED "YES, I DRANK TEA."  PHYSICIAN NOTIFIED AND ORDERS
RECEIVED.

## 2021-03-01 VITALS — DIASTOLIC BLOOD PRESSURE: 77 MMHG | SYSTOLIC BLOOD PRESSURE: 153 MMHG

## 2021-03-01 VITALS — SYSTOLIC BLOOD PRESSURE: 158 MMHG | DIASTOLIC BLOOD PRESSURE: 77 MMHG

## 2021-03-01 VITALS — DIASTOLIC BLOOD PRESSURE: 70 MMHG | SYSTOLIC BLOOD PRESSURE: 136 MMHG

## 2021-03-01 VITALS — DIASTOLIC BLOOD PRESSURE: 90 MMHG | SYSTOLIC BLOOD PRESSURE: 173 MMHG

## 2021-03-01 VITALS — SYSTOLIC BLOOD PRESSURE: 136 MMHG | DIASTOLIC BLOOD PRESSURE: 71 MMHG

## 2021-03-01 LAB
ANION GAP SERPL CALCULATED.3IONS-SCNC: 13 MMOL/L
BUN SERPL-MCNC: 69 MG/DL (ref 8–23)
BUN/CREAT SERPL: 41
CHLORIDE SERPL-SCNC: 107 MMOL/L (ref 95–108)
CHOLEST SERPL-MCNC: 116 MG/DL (ref 0–199)
CHOLEST/HDLC SERPL: 2.2 {RATIO}
CO2 SERPL-SCNC: 22 MMOL/L (ref 22–30)
CREAT SERPL-MCNC: 1.7 MG/DL (ref 0.7–1.3)
ERYTHROCYTE [DISTWIDTH] IN BLOOD BY AUTOMATED COUNT: 16.2 % (ref 11.5–15.5)
HCT VFR BLD AUTO: 27 % (ref 39–50)
HDLC SERPL-MCNC: 52 MG/DL (ref 40–?)
HGB BLD-MCNC: 8 G/DL (ref 14–18)
LDLC SERPL CALC-MCNC: 55 MG/DL
MAGNESIUM SERPL-MCNC: 2.3 MG/DL (ref 1.6–2.3)
MCH RBC QN AUTO: 26 PG  CALC (ref 26–32)
MCHC RBC AUTO-ENTMCNC: 29.6 G/DL CAL (ref 32–36)
MCV RBC AUTO: 87.7 FL  CALC (ref 80–100)
PLATELET # BLD AUTO: 166 THOU/UL (ref 130–400)
POTASSIUM SERPL-SCNC: 5.8 MMOL/L (ref 3.5–5.1)
RBC # BLD AUTO: 3.08 MILL/UL (ref 4.7–6.1)
SODIUM SERPL-SCNC: 136 MMOL/L (ref 137–146)
TRIGL SERPL-MCNC: 46 MG/DL (ref 30–149)
VLDLC SERPL CALC-MCNC: 9 MG/DL

## 2021-03-01 NOTE — NUR
PATIENT RESTING IN THE BED, AXOX3. IV INFUSING, LOPEZ CATH TO BEDSIDE
DRAINAGE. O2 RA. NO RESP DISTRESS NOTED ART THIS TIME. HE DENIES PAIN.
REPOSITIOEND FOR COMFORT, SIDE RAILS UP CALL LIGHT IN REACH, BED LOCKED IN LOW
POSITION, WILL CONTINUE TO MONITOR THE PATIENT.

## 2021-03-01 NOTE — NUR
OUT OF THE BED TO THE BEDSIDE COMMODE WITH ASST. ENCOURAGED  THE
PATIENT TO DO
AS MUCH FOR HIMSELF AS HE CAN. PRESENTLY RESING IN THE BED. NO DISTRESS NOTED
AT THIS TIME. CALL LIGHT IN REACH. WILL CONTINUE TO MONITOR THE PATIENT.

## 2021-03-01 NOTE — NUR
PT C/O ITCHING. BENEDRYL PROVIDED AND BARRIER CREAM PLACED FOR COMFORT. PT NOW
C/O ITCHING TO SCROTAL AREA/CREAM PLACED.

## 2021-03-01 NOTE — NUR
IN [facility] PT AT THIS TIME OBTAINING BLOOD FOR LAB DRAWS.  NO S/O
DISTRESS NOTED. CALL LIGHT AT SIDE.

## 2021-03-02 VITALS — SYSTOLIC BLOOD PRESSURE: 149 MMHG | DIASTOLIC BLOOD PRESSURE: 71 MMHG

## 2021-03-02 VITALS — SYSTOLIC BLOOD PRESSURE: 148 MMHG | DIASTOLIC BLOOD PRESSURE: 78 MMHG

## 2021-03-02 VITALS — SYSTOLIC BLOOD PRESSURE: 124 MMHG | DIASTOLIC BLOOD PRESSURE: 72 MMHG

## 2021-03-02 VITALS — SYSTOLIC BLOOD PRESSURE: 153 MMHG | DIASTOLIC BLOOD PRESSURE: 70 MMHG

## 2021-03-02 VITALS — SYSTOLIC BLOOD PRESSURE: 166 MMHG | DIASTOLIC BLOOD PRESSURE: 78 MMHG

## 2021-03-02 VITALS — DIASTOLIC BLOOD PRESSURE: 75 MMHG | SYSTOLIC BLOOD PRESSURE: 139 MMHG

## 2021-03-02 VITALS — SYSTOLIC BLOOD PRESSURE: 137 MMHG | DIASTOLIC BLOOD PRESSURE: 61 MMHG

## 2021-03-02 LAB
ALBUMIN SERPL-MCNC: 2.8 G/DL (ref 3.2–5)
BUN SERPL-MCNC: 59 MG/DL (ref 8–23)
CHLORIDE SERPL-SCNC: 107 MMOL/L (ref 95–108)
CK SERPL-CCNC: 35 U/L (ref 52–200)
CO2 SERPL-SCNC: 22 MMOL/L (ref 22–30)
CREAT SERPL-MCNC: 1.6 MG/DL (ref 0.7–1.3)
MAGNESIUM SERPL-MCNC: 2.2 MG/DL (ref 1.6–2.3)
POTASSIUM SERPL-SCNC: 5.3 MMOL/L (ref 3.5–5.1)
SODIUM SERPL-SCNC: 134 MMOL/L (ref 137–146)

## 2021-03-02 NOTE — NUR
PT HAD A LARGE SOFT STOOL. PT NOW RESTING IN THE BEDSIDE CHAIR . REQUEST O2
EDUCATED PT HIS O2 SAT AT 97 PERCENT ON RA. PT GIVEN O2 AT HIS REQUEST. PT
STATES "THATS BETTER"

## 2021-03-02 NOTE — NUR
COMPLETE HYGINE CARE GIVEN TO THE PATIENT. GOWN AND SHEET WITHOUT CHEMICALS ON
THEM. RESTING COMFORTABLE IN THE BED. NOTED PT IS NOT ITCHING AT THIS TIME.

## 2021-03-02 NOTE — NUR
PT RESTED IN BED TROUGHOUT THE NIGHT. C/O BACK PAIN AND A HEADACHE DURING
SHIFT, MEDICATED FOR PAIN AS PER MD ORDER. IV SITE TO RAC INFILTRATED, NEW
SITE STARTED AT R WRIST # 20. PT TOLERATED WELL. NO COMPLAINTS AT THIS TIME.
WILL MONITOR.

## 2021-03-02 NOTE — NUR
PT RESTING IN THE BED AXOX3. IV INFUSING, PATIENT DENIES PAIN. LOPEZ TO
BEDSIDE DRAINAGE. C/O ITCHING, EDUCATED PT HE HAS BEEN GIVEN BENADRYL AND
ENCOURAGED BATHING AND BED CLOTHES WITHOUT CHEMICALS. REPOSITIOEND FOR
COMFORT, SIDE RAISL UP CALL LIGHT INR EACH BED LOCKED IN LOW POSITION, WILL
CONTINUE TO MONITOR THE PATIENT.

## 2021-03-02 NOTE — NUR
PT RESTING QUIETLY IN BED, C/O PAIN REPORTED/HEADACHE. PT REQUESTED TYLENOL,
ADMINSTERED PER ORDER. NO S/S OF DISTRESS. BREATHING EVEN AND UNLABORED. NO
CONCERNS AT THIS TIME. IV FLUIDS CONTINUE 10NL/HR. PT HAD A BM THIS SHIFT, NO
COMPLAINTS VOICED. WILL CONTINUE TO MONITOR.

## 2021-03-03 VITALS — DIASTOLIC BLOOD PRESSURE: 89 MMHG | SYSTOLIC BLOOD PRESSURE: 156 MMHG

## 2021-03-03 VITALS — SYSTOLIC BLOOD PRESSURE: 153 MMHG | DIASTOLIC BLOOD PRESSURE: 77 MMHG

## 2021-03-03 VITALS — DIASTOLIC BLOOD PRESSURE: 76 MMHG | SYSTOLIC BLOOD PRESSURE: 145 MMHG

## 2021-03-03 VITALS — SYSTOLIC BLOOD PRESSURE: 155 MMHG | DIASTOLIC BLOOD PRESSURE: 73 MMHG

## 2021-03-03 VITALS — DIASTOLIC BLOOD PRESSURE: 79 MMHG | SYSTOLIC BLOOD PRESSURE: 148 MMHG

## 2021-03-03 VITALS — DIASTOLIC BLOOD PRESSURE: 82 MMHG | SYSTOLIC BLOOD PRESSURE: 158 MMHG

## 2021-03-03 LAB
ALBUMIN SERPL-MCNC: 3.3 G/DL (ref 3.2–5)
BUN SERPL-MCNC: 54 MG/DL (ref 8–23)
CHLORIDE SERPL-SCNC: 104 MMOL/L (ref 95–108)
CO2 SERPL-SCNC: 21 MMOL/L (ref 22–30)
CREAT SERPL-MCNC: 1.3 MG/DL (ref 0.7–1.3)
ERYTHROCYTE [DISTWIDTH] IN BLOOD BY AUTOMATED COUNT: 16 % (ref 11.5–15.5)
HCT VFR BLD AUTO: 27 % (ref 39–50)
HGB BLD-MCNC: 8.1 G/DL (ref 14–18)
MCH RBC QN AUTO: 25.8 PG  CALC (ref 26–32)
MCHC RBC AUTO-ENTMCNC: 30 G/DL CAL (ref 32–36)
MCV RBC AUTO: 86 FL  CALC (ref 80–100)
PLATELET # BLD AUTO: 180 THOU/UL (ref 130–400)
POTASSIUM SERPL-SCNC: 5.8 MMOL/L (ref 3.5–5.1)
RBC # BLD AUTO: 3.14 MILL/UL (ref 4.7–6.1)
SODIUM SERPL-SCNC: 132 MMOL/L (ref 137–146)

## 2021-03-03 NOTE — NUR
PATIENT SITTING AT BEDSIDE EATING LUNCH AT THIS TIME. CALL LIGHT WITHIN REACH
SIDERAILS ARE UP X2 LOPEZ PATENT AND DRAINING YELLOW URINE AT THIS TIME.
PATIENT DENIES ANY OTHER NEEDS AT THIS TIME.

## 2021-03-03 NOTE — NUR
PT NOTED TO HAVE INCREASED SOB AND INCREASED RSPIRATORY RATE, UPPER LOBES AND
MIDDLE LOBE CTA, LEFT BASE CTA, RIGHT BASE DIMISHED. 02 SATURATION % ON
O2 @ 2L VIA NC. RELAXATION TECHNIQUES TAUGHT TO PT AND PT ATTEMPTED SOME
TECHNIQUES WITHOUT MUST SUCCESS. THIS NURSE SPOKE WITH ON CALL PHYSICIAN WHO
ORDERED A CXR, AND THN INDICATED HE WILL REEVAL PLAN AFTER RESULTS OF CXR COME
BACK. ORDER SUBMITTED INTO [facility] FOR CXR. PT COMPLAINED OF BURNING AT IV
SITE, # 20 LEFT WRIST. NEW IV STARTED IN RAC #22 WITHOUT DIFFICULTY. PT
TOLERATED WELL. WILL MONITOR.

## 2021-03-03 NOTE — NUR
PATIENT RESTING IN BED AT THIS TIME WATCHING TV. PATIENT DENEIS ANY NEEDS
CURRENTLY SIDERAILS ARE UP CALL LIGHT AND PERSONAL ITEMS WITHIN REACH.

## 2021-03-03 NOTE — NUR
NEW ORDER REECEIVED FROM ON CALL PHYSICIAN, JACK 3.375 GM IV EVERY 6 HRS.
ORDER WRITTEN IN THE CHART AND FAXED TO PHARMACY. PENDING PHARMACY UPLOAD TO
[facility].

## 2021-03-03 NOTE — NUR
PATIENT RESTING IN BED AT THIS TIME RN ASSESSMENT DONE SEE INTERVENTIONS.
PATIENT STATES HE IS SHORT OF BREATH O2 ON AT 2 LITERS AND SPO2 IS 92%.
PATIENT IS NOT EXHIBITING SIGNS OF BREATHING STRESS AT THIS TIME. CALL LIGHIT
IS WITHIN REACH SIDERAILS ARE UP X 2 AND PERSONAL ITEMS ARE WITHIN REACH .

## 2021-03-03 NOTE — NUR
PT NOTE
MR Nayak was seated at bed side as entered room.  Patient agreed to
participate in therpy session.  Sit>stand(MID A) for correct hand placement as
he ascends to a standing position, Static standing balance no loss of balance
noted, Ambulated from bed to toilette(w/ FWW, (CGA) no loss of balance noted,
Stand>sit to toilette(independant).  Sit>stand (independant) ambulated to bed
side((CGA), stand>sit (independant).  verbal cues for correct hand placement
as he descends to a seated position.  Patient was seated at bed side with tray
table and call bell by side as exited room.
[facility] 6 score of 13 skilled nursing facility

## 2021-03-03 NOTE — NUR
CXR CAME BACK AND INDICATED THAT PT HAS BIBASILAR INFILTRATES, WORSE ON THE
RIGHT SIDE. THIS NURSE CALLED PHYSICIAN WHO GAVE ORDER TO ADMINISTER 40MG OF
LASIX IV X 1 DOSE, HOLD AM DOSE OF LASIX. CXR EXPLAINED TO PT AND WHAT THE
PLAN WAS AND PT INDICATED UNDERSTANDING VERBALLY. IV LASIX ADMINSTERED. WILL
CONTINUE TO MONITOR.

## 2021-03-04 VITALS — DIASTOLIC BLOOD PRESSURE: 65 MMHG | SYSTOLIC BLOOD PRESSURE: 135 MMHG

## 2021-03-04 VITALS — SYSTOLIC BLOOD PRESSURE: 146 MMHG | DIASTOLIC BLOOD PRESSURE: 65 MMHG

## 2021-03-04 VITALS — DIASTOLIC BLOOD PRESSURE: 70 MMHG | SYSTOLIC BLOOD PRESSURE: 159 MMHG

## 2021-03-04 VITALS — DIASTOLIC BLOOD PRESSURE: 69 MMHG | SYSTOLIC BLOOD PRESSURE: 140 MMHG

## 2021-03-04 VITALS — DIASTOLIC BLOOD PRESSURE: 69 MMHG | SYSTOLIC BLOOD PRESSURE: 138 MMHG

## 2021-03-04 VITALS — DIASTOLIC BLOOD PRESSURE: 71 MMHG | SYSTOLIC BLOOD PRESSURE: 121 MMHG

## 2021-03-04 LAB
ANION GAP SERPL CALCULATED.3IONS-SCNC: 11 MMOL/L
BASOPHILS NFR BLD AUTO: 1 % (ref 0–3)
BUN SERPL-MCNC: 55 MG/DL (ref 8–23)
BUN/CREAT SERPL: 43
CHLORIDE SERPL-SCNC: 101 MMOL/L (ref 95–108)
CO2 SERPL-SCNC: 23 MMOL/L (ref 22–30)
CREAT SERPL-MCNC: 1.3 MG/DL (ref 0.7–1.3)
EOSINOPHIL NFR BLD AUTO: 3 % (ref 0–8)
ERYTHROCYTE [DISTWIDTH] IN BLOOD BY AUTOMATED COUNT: 16.1 % (ref 11.5–15.5)
HCT VFR BLD AUTO: 24.3 % (ref 39–50)
HGB BLD-MCNC: 7.3 G/DL (ref 14–18)
IMM GRANULOCYTES NFR BLD: 0.4 % (ref 0–5)
LYMPHOCYTES NFR BLD: 21 % (ref 15–41)
MCH RBC QN AUTO: 26.3 PG  CALC (ref 26–32)
MCHC RBC AUTO-ENTMCNC: 30 G/DL CAL (ref 32–36)
MCV RBC AUTO: 87.4 FL  CALC (ref 80–100)
MONOCYTES NFR BLD AUTO: 9 % (ref 2–13)
NEUTROPHILS # BLD AUTO: 2.94 THOU/UL (ref 1.82–7.42)
NEUTROPHILS NFR BLD AUTO: 66 % (ref 42–76)
PLATELET # BLD AUTO: 145 THOU/UL (ref 130–400)
POTASSIUM SERPL-SCNC: 5.2 MMOL/L (ref 3.5–5.1)
RBC # BLD AUTO: 2.78 MILL/UL (ref 4.7–6.1)
SODIUM SERPL-SCNC: 130 MMOL/L (ref 137–146)

## 2021-03-04 PROCEDURE — 02HV33Z INSERTION OF INFUSION DEVICE INTO SUPERIOR VENA CAVA, PERCUTANEOUS APPROACH: ICD-10-PCS | Performed by: RADIOLOGY

## 2021-03-04 PROCEDURE — B518ZZA FLUOROSCOPY OF SUPERIOR VENA CAVA, GUIDANCE: ICD-10-PCS | Performed by: RADIOLOGY

## 2021-03-04 NOTE — NUR
PATIENT RETURNED FROM RADIOLOGY WITH A DOUBLE LUMEN RIGHT UPPER ARM PICC LINE.
LINE FLUSHED WITHOUT RESISTANCE AT THIS TIME. IV IN RIGHT WRIST REMOVED.

## 2021-03-04 NOTE — NUR
SPOKE WITH PHYSICIAN REGARDING PT SYMPTOMS, PHYSICIAN ORDERED FOR AN
ADDITIONAL DOSE OF LASIX FOR THIS AM ON TOP OF LASIX THAT WAS GIVEN AROUND
0306. ORDER WAS WRITTEN AND FAXED TO PHARMACY. WILL MONIOR FOR EFFECTIVENESS
OF ADDITIONAL DOSAGE.

## 2021-03-04 NOTE — NUR
PT SITTING UP IN BED. ALERT AND ORIENTED. PT MEDICATED AS ORDERED. NO APPARENT
RESPIRATORY DISTRESS NOTED. RESPIRATIONS EVEN AND UNLABORED. O2 @ 2L/M VIA NC.
TELEMETRY MONITOR IN PLACE. PT C/O ITCHING, MEDICATED WITH PRN BENADRYL. LOPEZ
PATENT DRAINING TO GRAVITY WITH YELLOW OUTPUT NOTED. DOUBLE LUMEN PICC TO DEEPAK,
DRESSING CDI. KVO FLUIDS INFUSING. DISCUSSED POC. PT VERBALIZED UNDERSTANDING.
DIABETIC SNACK PROVIDED. PT DENIES ANY OTHER CURRENT WANTS OR NEEDS. CALL
LIGHT WITHIN REACH. WILL CONTINUE TO MONITOR.

## 2021-03-04 NOTE — NUR
PT CALLED THIS NURSE TO THE ROOM. C/O INCREASED SOB. NOTED INCREASED EDEMA TO
ABDOMEN AND BLLE. ADVISED PT THAT WE ARE UNABLE TO ADMINSTER LASIX D/T PT
REFUSING TO HAVE AN IV PLACED. PT CONSENTED TO ALLOW PLACEMENT OF # 22 TO
RFA, ADMINSTERED LASIX 40MG IV WITHOUT DIFFICULTY. WILL ALLOW TIME FOR LASIX
TO WORK, O2 SATURATION 100%. NO S/S OF DISTRESS AT THIS TIME. WILL MONITOR.

## 2021-03-04 NOTE — NUR
PATIENT RESTING IN BED AT THIS TIME WITH 2 L OF 02 AND SPO2 %. BILATERAL
LEGS SWELLING NOTED GREATER ON LEFT SIDE 1 1/2 TO 2+. NO WHEEZING NOTED AT
THIS TIME. PATIENT DENIES PAIN. CALL LIGHT IS WITHIN REACH LOPEZ PATENT AND
DRAINING CLEAR YELLOW URINE SIDERAILS ARE UP X 2. PATIENT REMINIDED THAT
PLAVIX WILL BE HELD THIS MORNING DUE TO PLACEMENT OF PICC LINE TODAY. PATIENT
VERBALIZES UNDERSTANDING.

## 2021-03-04 NOTE — NUR
PATIENT RESTING IN BED AT THIS TIME. CHEM 7 LAB DRAWN OFF OF PICC LINE AND
PICC LINE FLUSHED AT THIS TIME. PATIENT DENIES ANY PAIN AT THIS TIME CALL
LIGHT IS IN PLACE SIDERAILS ARE UP X 2. LOPEZ REMAINS PATENT AND DRAINIING
CLEAR YELLOW URINE AT THIS TIME.

## 2021-03-04 NOTE — NUR
PT RESTING QUIETLY IN BED AT THIS TIME, WITH EYES CLOSED. BREATHING IS EVEN
AND UNLABORED. LUNGS CTA IN UPPER LOBES, SLIGHTLY DIMINSHED IN THE LOWER
LOBES. O2 CONTINUES AT 2L VIA NC. BS ACTIVE X 4 QUADS, PT HAD A BM TONIGHT.
SKIN INTACT. HEART SOUNDS ARE WNL. PT C/O PAIN EARLIER IN SHIFT, ADMINSTERED
PERCOCET AS PER ORDER. MEDICATION EFFECTIVE ON RETURN EVAL. PT CONTINUE TO
RFUSE IV PLACEMENT. PT HAS ORDERED MIDNIGHT ZOSYN, UNABLE TO ADMISTER DUE TO
NO IV SITE, PHYSICIAN AWARE. LOVENOX HLD DUE TO PT HAVING A PICC LINE INSERTED
TOMORROW. BLOOD SUGAR AT BEDTIME , PT WAS COVERED WITH ORDERED FAST
ACTING INSULIN 3 UNITS. PT TOLERATED WELL.

## 2021-03-04 NOTE — NUR
PT WAS RESTING ON HIS LEFT SIDE AT TIME OF MED PASS WITH HIS EYES CLOSED.
ADMINSTERED IV ZOSYN AS PER ORDER. PT TOLERATED WELL, DENIES ANY IV
COMPLICATIONS AT THIS TIME. WILL MONITOR.

## 2021-03-04 NOTE — NUR
PATIENT LAYING IN BED AT THIS TIME STATES HIS HEADACE IS GETTING BETTER. HIS
PAIN IS A 3 FROM A 4 SINCE TYLENOL WAS GIVEN. CBC DRAWN FROM PICC LINE AND
SENT TO LAB PICC LINE FLUSHED WITH 10ML OF SALINE AT THIS TIME AND FLUSHED
WITHOUT RESISTANCE. CALL LIGHT NEARS SIDERAILS UP X2

## 2021-03-04 NOTE — NUR
PT IS RESTING AT THIS TIME. BREATHING SEEMS TO BE EASIER FOR THE PT AT THIS
TIME. REPORTS IT IS EASIER TO BREATHE RIGHT NOW. WILL CONTINUE TO MONITOR.

## 2021-03-05 VITALS — DIASTOLIC BLOOD PRESSURE: 72 MMHG | SYSTOLIC BLOOD PRESSURE: 143 MMHG

## 2021-03-05 VITALS — DIASTOLIC BLOOD PRESSURE: 70 MMHG | SYSTOLIC BLOOD PRESSURE: 142 MMHG

## 2021-03-05 VITALS — SYSTOLIC BLOOD PRESSURE: 142 MMHG | DIASTOLIC BLOOD PRESSURE: 70 MMHG

## 2021-03-05 VITALS — SYSTOLIC BLOOD PRESSURE: 164 MMHG | DIASTOLIC BLOOD PRESSURE: 80 MMHG

## 2021-03-05 VITALS — SYSTOLIC BLOOD PRESSURE: 134 MMHG | DIASTOLIC BLOOD PRESSURE: 70 MMHG

## 2021-03-05 VITALS — SYSTOLIC BLOOD PRESSURE: 136 MMHG | DIASTOLIC BLOOD PRESSURE: 72 MMHG

## 2021-03-05 VITALS — SYSTOLIC BLOOD PRESSURE: 129 MMHG | DIASTOLIC BLOOD PRESSURE: 69 MMHG

## 2021-03-05 VITALS — DIASTOLIC BLOOD PRESSURE: 81 MMHG | SYSTOLIC BLOOD PRESSURE: 150 MMHG

## 2021-03-05 VITALS — DIASTOLIC BLOOD PRESSURE: 72 MMHG | SYSTOLIC BLOOD PRESSURE: 152 MMHG

## 2021-03-05 VITALS — DIASTOLIC BLOOD PRESSURE: 76 MMHG | SYSTOLIC BLOOD PRESSURE: 152 MMHG

## 2021-03-05 LAB
ALBUMIN SERPL-MCNC: 3.1 G/DL (ref 3.2–5)
BUN SERPL-MCNC: 52 MG/DL (ref 8–23)
CHLORIDE SERPL-SCNC: 102 MMOL/L (ref 95–108)
CO2 SERPL-SCNC: 28 MMOL/L (ref 22–30)
CREAT SERPL-MCNC: 1.4 MG/DL (ref 0.7–1.3)
POTASSIUM SERPL-SCNC: 5.4 MMOL/L (ref 3.5–5.1)
SODIUM SERPL-SCNC: 133 MMOL/L (ref 137–146)

## 2021-03-05 PROCEDURE — 30243N1 TRANSFUSION OF NONAUTOLOGOUS RED BLOOD CELLS INTO CENTRAL VEIN, PERCUTANEOUS APPROACH: ICD-10-PCS | Performed by: INTERNAL MEDICINE

## 2021-03-05 NOTE — NUR
PT RESTING IN BED WITH EYES CLOSED. NO APPARENT DISTRESS NOTED. JESSICA REMAINS
PATENT. CALL LIGHT WITHIN REACH. WILL CONTINUE TO MONITOR.

## 2021-03-05 NOTE — NUR
PT NOTE
The patient is seenfor transfer training and standing marching . He was able
to stand with mod assist of 1 but became dyspneic and had feelings of malaise
after 10 steps.
His Am Pac is unchanged but he really struggled today with ambulation.
He reported feeling as if his "legs were filling up again"
Lucía is aware. He would greatly benefit from ECF for monitored progression
of his exercise program to regain independence as he lives alone
Our plan is to continue monitored strengthening. He is instructed to limit his
exercise and activity based on the feeling of malaise and dyspnea

## 2021-03-05 NOTE — NUR
PT RESTING IN BED WITH EYES CLOSED, WAKES EASILY. NO APPARENT DISTRESS NOTED.
RESPIRATIONS EVEN AND UNLABORED, ON 2L/M VIA NC. +2 EDEMA NOTED TO BLE.
TELEMETRY MONITOR IN PLACE. DOUBLE LUMEN PICC TO SUZY SOTELO. LOPEZ PATENT
DRAINING TO GRAVITY. PT DENIES ANY PAIN OR ITCHING AT THIS TIME. DISCUSSED
POC. PT VERBALIZED UNDERSTANDING. NO CURRENT WANTS OR NEEDS. CALL LIGHT WITHIN
REACH. WILL CONTINUE TO MONITOR.

## 2021-03-05 NOTE — NUR
PAITENT SITTING UP IN CHAIR AT THIS TIME PATIENT DENIES ANY PAIN AT THIS TIME.
PATIENT SPO2  % ON 2L AND DENIES SHORTNESS OF BREATH. PATIENT HAS NO
SIGNS OF WHEEZES NOTED. BILATERAL EDEMA IS 2+ AND ARMS SHOW SLIGHT EDEMA AT
THIS TIME. RIGHT UPPER PICC LINE IS INTACT AND NO VISIABLE SIGNS OF IV SITE
INFECTION. RN ASSESSMENT DONE SEE INTERVENTIONS AT THIS TIME. CALL LILGHT AND
PERSONAL ITEMS WITHIN REACH. SIDERAILS ARE UP X2

## 2021-03-05 NOTE — NUR
BLOOD TRANSFUSIION ENDED AT THIS TIME T. 97.1 P. 69 R. 20 /72. PATIENT
DENIES ANY PAIN AT THIS TIME OR SHORTNESS OF BREATH AND IS SITTING AT BEDSIDE
EATING LUNCH AT THIS TIME.

## 2021-03-05 NOTE — NUR
PT C/O OF SOB. O2 SATS 100% ON 2L/M VIA NC. LUNGS SOUNDS CLEAR. PT
REPOSITIONED IN BED, WITH EFFECT. PT MEDICATED FOR PAIN IN LEGS 8/10. +1
PITTING EDEMA NOTED, ELEVATED AT THIS TIME. ENCOURAGED PT TO DO DEEP BREATHING
EXERCISES. JESSICA REMAINS PATENT. NO OTHER CURRENT WANTS OR NEEDS. CALL LIGHT
WITHIN REACH. WILL CONTINUE TO MONITOR.

## 2021-03-05 NOTE — NUR
ORDER OBTAINED FROM PATIENT TO INFUSE 1 UNIT OF PACKED RED BLOOD CELLS AT THIS
TIME. PATIENT VERBALIZES UNDERSTANDING OF TRANSFUSIONS AND SIGNS AND SYMTOMS
OF BLOOD TRANSFUSION COMPLICATION.

## 2021-03-05 NOTE — NUR
PATIENT RESTING IN BED AT THIS TIME BLOOD REMAINS INFUSING AT THIS TIME
PATIENT DENIES ANY PAIN OR SHORTNESS OF BREATH. VITAL SIGNS ARE STABLE SEE
INTERVENTIONS. CALL LIGHT IS WITHIN REACH SIDERAILS ARE UP X 2

## 2021-03-05 NOTE — NUR
PATIENT RESTING IN BED AT THIS TIME. PATIENT STATED THAT HE IS TIRED FROM
BEING UP ALL NIGHT LONG. PATIENT EXHIBITS NO WHEEZING AT THIS TIME. SIDERAILS
ARE UP CALL LIGHT IS WITHIN REACH.

## 2021-03-05 NOTE — NUR
PATIENT RESTING IN BED AT THIS TIME PICC LINE CARE DONE BOTH LUMENS FLUSHED
WITH HEPARIN AT THIS TIME. PATIENT DENIES ANY NEEDS CALL LIGHT IS WITHIN REACH
SIDERAILS ARE UP X2.

## 2021-03-05 NOTE — NUR
PT CONTINUES TO C/O OF SOB. O2 SATS REMAIN 10% ON 2L/M VIA NC. LUNG SOUNDS
CLEAR. PAIN MEDICATION NOT EFFECTIVE FOR PAIN IN LEGS. ARNP ON CALL NOTIFIED
OF INCREASED EDEMA IN BLE AND COMPLAINTS OF SOB. ENCOURAGED PT TO GET OOB INTO
CHAIR AND ELEVATE BLE. OK TO GIVE 6AM LASIX EARLY. PT ASSISTED OOB INTO CHAIR.
PT STATES BREATHING A LITTLE BETTER. PT BECAME NAUSEATED, MEDICATED WITH PRN
ZOFRAN. WILL CONTINUE TO MONITOR.

## 2021-03-05 NOTE — NUR
PATIENT LAYING IN BED AT THIS TIME BLOOD TRANSFUSING IN AND PATIENT DENEIS ANY
SIGNS OR SYMPTOMS OF BLOOD TRANSFUSION COMPLICATIONS AT THIS TIME. VITAL SIGNS
ARE STABLE SEE INTERVEINTION. SIDERAILS ARE UP CALL LIGHT WITHIN REACH.
PATIENT DENIES ANY PAIN.

## 2021-03-06 VITALS — SYSTOLIC BLOOD PRESSURE: 153 MMHG | DIASTOLIC BLOOD PRESSURE: 75 MMHG

## 2021-03-06 VITALS — SYSTOLIC BLOOD PRESSURE: 142 MMHG | DIASTOLIC BLOOD PRESSURE: 69 MMHG

## 2021-03-06 VITALS — SYSTOLIC BLOOD PRESSURE: 115 MMHG | DIASTOLIC BLOOD PRESSURE: 60 MMHG

## 2021-03-06 VITALS — SYSTOLIC BLOOD PRESSURE: 157 MMHG | DIASTOLIC BLOOD PRESSURE: 71 MMHG

## 2021-03-06 VITALS — SYSTOLIC BLOOD PRESSURE: 139 MMHG | DIASTOLIC BLOOD PRESSURE: 77 MMHG

## 2021-03-06 LAB
ALBUMIN SERPL-MCNC: 3 G/DL (ref 3.2–5)
BASOPHILS NFR BLD AUTO: 1 % (ref 0–3)
BUN SERPL-MCNC: 49 MG/DL (ref 8–23)
CHLORIDE SERPL-SCNC: 101 MMOL/L (ref 95–108)
CO2 SERPL-SCNC: 27 MMOL/L (ref 22–30)
CREAT SERPL-MCNC: 1.4 MG/DL (ref 0.7–1.3)
EOSINOPHIL NFR BLD AUTO: 3 % (ref 0–8)
ERYTHROCYTE [DISTWIDTH] IN BLOOD BY AUTOMATED COUNT: 16.4 % (ref 11.5–15.5)
HCT VFR BLD AUTO: 27.7 % (ref 39–50)
HGB BLD-MCNC: 8.3 G/DL (ref 14–18)
IMM GRANULOCYTES NFR BLD: 0.8 % (ref 0–5)
LYMPHOCYTES NFR BLD: 23 % (ref 15–41)
MCH RBC QN AUTO: 26.5 PG  CALC (ref 26–32)
MCHC RBC AUTO-ENTMCNC: 30 G/DL CAL (ref 32–36)
MCV RBC AUTO: 88.5 FL  CALC (ref 80–100)
MONOCYTES NFR BLD AUTO: 10 % (ref 2–13)
NEUTROPHILS # BLD AUTO: 3.26 THOU/UL (ref 1.82–7.42)
NEUTROPHILS NFR BLD AUTO: 63 % (ref 42–76)
PLATELET # BLD AUTO: 153 THOU/UL (ref 130–400)
POTASSIUM SERPL-SCNC: 4.7 MMOL/L (ref 3.5–5.1)
RBC # BLD AUTO: 3.13 MILL/UL (ref 4.7–6.1)
SODIUM SERPL-SCNC: 134 MMOL/L (ref 137–146)

## 2021-03-06 NOTE — NUR
Discharge instructions given. Patient verbalizes understanding of same.
Discharged in stable condition via Wheelchair to Home with
*Other. All belongings sent with pt.
HOME WITH HOME HEALTH AS BEFORE

## 2021-03-06 NOTE — NUR
RESTING IN BED, INFORMED OF D/C ORDERS, STATED HE NEEDS RIDE HOME, SUPERVISOR
NOTIFIED AND OK TO PAY FOR CAB SERVICE.

## 2021-03-06 NOTE — NUR
SHIFT CHANGE REPORT, PT AWAKE ALERT AND ORIENTED SITTING UP ON SIDE OF BED, NO
C/O OF DISCOMFORT AT THIS TIME, TELE MONITOR IN PLACE, LOPEZ CATHETER IN PLACE
WITH CLEAR YELLOW URINE, CALL BELL IN REACH AND BED IN LOWEST POSITION.

## 2021-03-06 NOTE — NUR
PT C/O ITCHING. MEDICATED WITH PRN BENDRYL AND BABY POWDER PROVIDED UPON
REQUEST. NO APPARENT DISTRESS NOTED. RESPIRATIONS EVEN AND UNLABORED. O2 @
2L/M VIA NC. NO OTHER CURRENT WANTS OR NEEDS. CALL LIGHT WITHIN REACH. WILL
CONTINUE TO MONITOR.

## 2021-03-08 ENCOUNTER — HOSPITAL ENCOUNTER (INPATIENT)
Dept: HOSPITAL 82 - ED | Age: 61
LOS: 9 days | Discharge: HOME HEALTH | DRG: 291 | End: 2021-03-17
Attending: INTERNAL MEDICINE | Admitting: INTERNAL MEDICINE
Payer: MEDICARE

## 2021-03-08 VITALS — WEIGHT: 246.92 LBS | HEIGHT: 71 IN | BODY MASS INDEX: 34.57 KG/M2

## 2021-03-08 VITALS — DIASTOLIC BLOOD PRESSURE: 90 MMHG | SYSTOLIC BLOOD PRESSURE: 174 MMHG

## 2021-03-08 DIAGNOSIS — N18.9: ICD-10-CM

## 2021-03-08 DIAGNOSIS — Z91.81: ICD-10-CM

## 2021-03-08 DIAGNOSIS — Z95.3: ICD-10-CM

## 2021-03-08 DIAGNOSIS — D63.1: ICD-10-CM

## 2021-03-08 DIAGNOSIS — Z85.819: ICD-10-CM

## 2021-03-08 DIAGNOSIS — E11.22: ICD-10-CM

## 2021-03-08 DIAGNOSIS — N40.1: ICD-10-CM

## 2021-03-08 DIAGNOSIS — I50.23: ICD-10-CM

## 2021-03-08 DIAGNOSIS — R53.1: ICD-10-CM

## 2021-03-08 DIAGNOSIS — I25.708: ICD-10-CM

## 2021-03-08 DIAGNOSIS — I13.0: Primary | ICD-10-CM

## 2021-03-08 DIAGNOSIS — E11.43: ICD-10-CM

## 2021-03-08 DIAGNOSIS — Z95.1: ICD-10-CM

## 2021-03-08 DIAGNOSIS — Z87.891: ICD-10-CM

## 2021-03-08 DIAGNOSIS — R33.8: ICD-10-CM

## 2021-03-08 DIAGNOSIS — Z79.4: ICD-10-CM

## 2021-03-08 DIAGNOSIS — Z86.73: ICD-10-CM

## 2021-03-08 DIAGNOSIS — Z92.3: ICD-10-CM

## 2021-03-08 DIAGNOSIS — I25.118: ICD-10-CM

## 2021-03-08 DIAGNOSIS — B35.6: ICD-10-CM

## 2021-03-08 DIAGNOSIS — Z92.21: ICD-10-CM

## 2021-03-08 DIAGNOSIS — Z79.02: ICD-10-CM

## 2021-03-08 DIAGNOSIS — K31.84: ICD-10-CM

## 2021-03-08 DIAGNOSIS — Z20.822: ICD-10-CM

## 2021-03-08 DIAGNOSIS — Z91.19: ICD-10-CM

## 2021-03-08 DIAGNOSIS — E78.5: ICD-10-CM

## 2021-03-08 DIAGNOSIS — Z95.5: ICD-10-CM

## 2021-03-08 DIAGNOSIS — K21.9: ICD-10-CM

## 2021-03-08 LAB
ALBUMIN SERPL-MCNC: 3.5 G/DL (ref 3.2–5)
ALP SERPL-CCNC: 141 U/L (ref 38–126)
ANION GAP SERPL CALCULATED.3IONS-SCNC: 11 MMOL/L
AST SERPL-CCNC: 27 U/L (ref 19–48)
BASOPHILS NFR BLD AUTO: 1 % (ref 0–3)
BILIRUB UR QL STRIP.AUTO: NEGATIVE
BUN SERPL-MCNC: 52 MG/DL (ref 8–23)
BUN/CREAT SERPL: 45
CHLORIDE SERPL-SCNC: 103 MMOL/L (ref 95–108)
CO2 SERPL-SCNC: 25 MMOL/L (ref 22–30)
COLOR UR AUTO: YELLOW
CREAT SERPL-MCNC: 1.2 MG/DL (ref 0.7–1.3)
EOSINOPHIL NFR BLD AUTO: 2 % (ref 0–8)
ERYTHROCYTE [DISTWIDTH] IN BLOOD BY AUTOMATED COUNT: 18.5 % (ref 11.5–15.5)
GLUCOSE UR STRIP.AUTO-MCNC: 100 MG/DL
HCT VFR BLD AUTO: 31.9 % (ref 39–50)
HGB BLD-MCNC: 9.7 G/DL (ref 14–18)
HGB UR QL STRIP.AUTO: (no result)
IMM GRANULOCYTES NFR BLD: 0.6 % (ref 0–5)
KETONES UR STRIP.AUTO-MCNC: NEGATIVE MG/DL
LEUKOCYTE ESTERASE UR QL STRIP.AUTO: NEGATIVE
LYMPHOCYTES NFR BLD: 14 % (ref 15–41)
MCH RBC QN AUTO: 26.8 PG  CALC (ref 26–32)
MCHC RBC AUTO-ENTMCNC: 30.4 G/DL CAL (ref 32–36)
MCV RBC AUTO: 88.1 FL  CALC (ref 80–100)
MONOCYTES NFR BLD AUTO: 8 % (ref 2–13)
NEUTROPHILS # BLD AUTO: 4.74 THOU/UL (ref 1.82–7.42)
NEUTROPHILS NFR BLD AUTO: 75 % (ref 42–76)
NITRITE UR QL STRIP.AUTO: NEGATIVE
PH UR STRIP.AUTO: 6 [PH] (ref 4.5–8)
PLATELET # BLD AUTO: 179 THOU/UL (ref 130–400)
POTASSIUM SERPL-SCNC: 4.5 MMOL/L (ref 3.5–5.1)
PROT SERPL-MCNC: 6.7 G/DL (ref 6.3–8.2)
PROT UR QL STRIP.AUTO: >=300 MG/DL
RBC # BLD AUTO: 3.62 MILL/UL (ref 4.7–6.1)
RBC #/AREA URNS HPF: (no result) RBC/HPF (ref 0–5)
SODIUM SERPL-SCNC: 135 MMOL/L (ref 137–146)
SP GR UR STRIP.AUTO: >=1.03
UROBILINOGEN UR QL STRIP.AUTO: 0.2 E.U./DL
WBC #/AREA URNS HPF: (no result) WBC/HPF (ref 0–5)

## 2021-03-08 NOTE — NUR
SCHEDULED MEDICATIONS ADMINISTERED, SEE E-MAR. PLAN OF CARE REVIEWED, PT
VERBALIZES UNDERSTANDING AND DENIES QUESTIONS. CALL BELL WITHIN REACH, AGREES
TO CALL PRN.

## 2021-03-08 NOTE — NUR
Admission Note
 
Report Given to:         ROBERT DAO
Transported by:           Wheelchair          X Stretcher
 
Transported with:       X  Nurse     Transporter   X Patent IV    O2
                         X Cardiac Monitor
 
Location:                 ICU      X MS2
 
 PT REPORT PROVIDED TO ROBERT DAO. EXISTING LOPEZ EMPTIED WITH 600 ML OF
URINE. PT VIA ER STRETCHER TO MS IN STABLE CONDITION.

## 2021-03-08 NOTE — NUR
PT RESTING ON STRETCHER IN NO APPARENT DISTRESS. RESP EVEN AND UNLABORED. SKIN
WARM AND DRY. ADVISED OF CONT WAIT TIME FOR ADMIT TO FLOOR. VERALIZED
UNDERSTANDING. DENIES ANY NEEDS.

## 2021-03-08 NOTE — NUR
PT ARRIVES TO UNIT VIA STRETCHER, ACCOMPANIED BY MARIA ELENA DU LPN. PT ABLE TO
STAND FROM STRETCHER AND PIVOT TO BED. PT SITTING UP AT SIDE OF BED. PHYSICAL
ASSESMENT COMPLETED. ASSISTED PT TO LAY IN BED. ADMISSION COMPLETED. CALL BELL
WITHIN REACH, AGREES TO CALL PRN.

## 2021-03-08 NOTE — NUR
ED PHYSICIAN CURRENTLY BEDSIDE WITH PATIENT WITH ULTRASOUND, DISCUSSED POSSIBLE
CENTRAL LINE PLACEMENT

## 2021-03-09 VITALS — SYSTOLIC BLOOD PRESSURE: 141 MMHG | DIASTOLIC BLOOD PRESSURE: 64 MMHG

## 2021-03-09 VITALS — DIASTOLIC BLOOD PRESSURE: 84 MMHG | SYSTOLIC BLOOD PRESSURE: 148 MMHG

## 2021-03-09 VITALS — DIASTOLIC BLOOD PRESSURE: 76 MMHG | SYSTOLIC BLOOD PRESSURE: 138 MMHG

## 2021-03-09 VITALS — SYSTOLIC BLOOD PRESSURE: 147 MMHG | DIASTOLIC BLOOD PRESSURE: 78 MMHG

## 2021-03-09 VITALS — SYSTOLIC BLOOD PRESSURE: 117 MMHG | DIASTOLIC BLOOD PRESSURE: 60 MMHG

## 2021-03-09 VITALS — DIASTOLIC BLOOD PRESSURE: 80 MMHG | SYSTOLIC BLOOD PRESSURE: 153 MMHG

## 2021-03-09 VITALS — SYSTOLIC BLOOD PRESSURE: 148 MMHG | DIASTOLIC BLOOD PRESSURE: 75 MMHG

## 2021-03-09 LAB
ALBUMIN SERPL-MCNC: 2.6 G/DL (ref 3.2–5)
ALP SERPL-CCNC: 109 U/L (ref 38–126)
ANION GAP SERPL CALCULATED.3IONS-SCNC: 8 MMOL/L
AST SERPL-CCNC: 18 U/L (ref 19–48)
BASOPHILS NFR BLD AUTO: 1 % (ref 0–3)
BUN SERPL-MCNC: 51 MG/DL (ref 8–23)
BUN/CREAT SERPL: 42
CHLORIDE SERPL-SCNC: 104 MMOL/L (ref 95–108)
CHOLEST SERPL-MCNC: 102 MG/DL (ref 0–199)
CHOLEST/HDLC SERPL: 2.2 {RATIO}
CO2 SERPL-SCNC: 26 MMOL/L (ref 22–30)
CREAT SERPL-MCNC: 1.2 MG/DL (ref 0.7–1.3)
EOSINOPHIL NFR BLD AUTO: 3 % (ref 0–8)
ERYTHROCYTE [DISTWIDTH] IN BLOOD BY AUTOMATED COUNT: 18.4 % (ref 11.5–15.5)
HCT VFR BLD AUTO: 27.1 % (ref 39–50)
HDLC SERPL-MCNC: 47 MG/DL (ref 40–?)
HGB BLD-MCNC: 8.1 G/DL (ref 14–18)
IMM GRANULOCYTES NFR BLD: 0.5 % (ref 0–5)
LDLC SERPL CALC-MCNC: 42 MG/DL
LYMPHOCYTES NFR BLD: 24 % (ref 15–41)
MAGNESIUM SERPL-MCNC: 2.2 MG/DL (ref 1.6–2.3)
MCH RBC QN AUTO: 26.7 PG  CALC (ref 26–32)
MCHC RBC AUTO-ENTMCNC: 29.9 G/DL CAL (ref 32–36)
MCV RBC AUTO: 89.4 FL  CALC (ref 80–100)
MONOCYTES NFR BLD AUTO: 9 % (ref 2–13)
NEUTROPHILS # BLD AUTO: 2.29 THOU/UL (ref 1.82–7.42)
NEUTROPHILS NFR BLD AUTO: 63 % (ref 42–76)
PLATELET # BLD AUTO: 137 THOU/UL (ref 130–400)
POTASSIUM SERPL-SCNC: 4.6 MMOL/L (ref 3.5–5.1)
PROT SERPL-MCNC: 5.3 G/DL (ref 6.3–8.2)
RBC # BLD AUTO: 3.03 MILL/UL (ref 4.7–6.1)
SODIUM SERPL-SCNC: 134 MMOL/L (ref 137–146)
TRIGL SERPL-MCNC: 66 MG/DL (ref 30–149)
VLDLC SERPL CALC-MCNC: 13 MG/DL

## 2021-03-09 PROCEDURE — B518ZZA FLUOROSCOPY OF SUPERIOR VENA CAVA, GUIDANCE: ICD-10-PCS | Performed by: RADIOLOGY

## 2021-03-09 PROCEDURE — 02HV33Z INSERTION OF INFUSION DEVICE INTO SUPERIOR VENA CAVA, PERCUTANEOUS APPROACH: ICD-10-PCS | Performed by: RADIOLOGY

## 2021-03-09 NOTE — NUR
PT UP AND EATING FOR BREAKFAST. SCHEDULED FOR PICC LINE ADMINISTRATION PER
ORDER. COUGH IS NON-PRODUCTIVE AND DRY. C/O SOB WITH EXERTION AND AT REST.
EDUCATED ON BREATHING TECHNIQUES AND MOVING SLOWLY. LOPEZ CATHER INTACT,
SLIGHT LINDA COLORED URINE. EDEMA NOTED TO PANFILO LOWER EXTREMITIES. PROVIDER
AWARE.

## 2021-03-09 NOTE — NUR
PT APPEARS TO BE SLEEPING COMFORTABLY, LAYING IN BED WITH EYES CLOSED,
RESPIRATIONS REGULAR AND UNLABORED, NO APPARENT DISTRESS. CALL GREENFIELD REMAINS
WITHIN REACH.

## 2021-03-09 NOTE — NUR
PT LAYING IN BED AWAKE. PHYSICAL ASSESMENT COMPLETE. RESPIRATIONS REGULAR AND
UNLABORED. SPO2 100% ON ROOM AIR. LUNGS CLEAR WITH DIMINISHED BASES. EDEMA
VISIBLY IMPROVED FROM ADMISSION BASELINE. PT REPORTS HE IS BREATHING BETTER
TODAY. SCHEDULED MEDICATIONS ADMINISTERED. PRN SONATA FOR SLEEP ADMINISTERED
PER PTS REQUEST. FINGER STICK GLUCOSE 173mg/DL, INSULIN ADMINISTERED PER
SLIDING SCALE. SEE E-MAR. PLAN OF CARE REVIEWED, PT VERBALIZES UNDERSTANDING
AND DENIES QUESTIONS. DIET COLA PROVIDED PER PT'S REQUEST. PT DENIES FURTHER
NEEDS AT THIS TIME. CALL BELL WITHIN REACH, AGREES TO CALL PRN.

## 2021-03-09 NOTE — NUR
PRN APAP ADMINISTERED FOR C/O HEADACHE, SEE E-MAR. 1/2 SANDWHICH, NUTRIGRAIN
BAR, AND PUDDING PROVIDED PER PTS REQUEST FOR "SOMETHING TO EAT". PT DENIES
FURTHER NEEDS AT THIS TIME. CALL BELL WITHIN REACH AGREES TO CALL PRN.

## 2021-03-10 VITALS — SYSTOLIC BLOOD PRESSURE: 156 MMHG | DIASTOLIC BLOOD PRESSURE: 84 MMHG

## 2021-03-10 VITALS — DIASTOLIC BLOOD PRESSURE: 86 MMHG | SYSTOLIC BLOOD PRESSURE: 155 MMHG

## 2021-03-10 VITALS — SYSTOLIC BLOOD PRESSURE: 161 MMHG | DIASTOLIC BLOOD PRESSURE: 77 MMHG

## 2021-03-10 VITALS — SYSTOLIC BLOOD PRESSURE: 159 MMHG | DIASTOLIC BLOOD PRESSURE: 81 MMHG

## 2021-03-10 VITALS — SYSTOLIC BLOOD PRESSURE: 157 MMHG | DIASTOLIC BLOOD PRESSURE: 80 MMHG

## 2021-03-10 LAB
ANION GAP SERPL CALCULATED.3IONS-SCNC: 8 MMOL/L
BUN SERPL-MCNC: 48 MG/DL (ref 8–23)
BUN/CREAT SERPL: 40
CHLORIDE SERPL-SCNC: 101 MMOL/L (ref 95–108)
CO2 SERPL-SCNC: 29 MMOL/L (ref 22–30)
CREAT SERPL-MCNC: 1.2 MG/DL (ref 0.7–1.3)
ERYTHROCYTE [DISTWIDTH] IN BLOOD BY AUTOMATED COUNT: 18.4 % (ref 11.5–15.5)
HCT VFR BLD AUTO: 28.3 % (ref 39–50)
HGB BLD-MCNC: 8.6 G/DL (ref 14–18)
MAGNESIUM SERPL-MCNC: 2.2 MG/DL (ref 1.6–2.3)
MCH RBC QN AUTO: 27.1 PG  CALC (ref 26–32)
MCHC RBC AUTO-ENTMCNC: 30.4 G/DL CAL (ref 32–36)
MCV RBC AUTO: 89.3 FL  CALC (ref 80–100)
PLATELET # BLD AUTO: 130 THOU/UL (ref 130–400)
POTASSIUM SERPL-SCNC: 4.3 MMOL/L (ref 3.5–5.1)
RBC # BLD AUTO: 3.17 MILL/UL (ref 4.7–6.1)
SODIUM SERPL-SCNC: 134 MMOL/L (ref 137–146)

## 2021-03-10 NOTE — NUR
PT WAS FOUND SITTING IN BEDSIDE CHAIR;PT IS A&O X3;PT REPORTS NO
PAIN;ASSESSMENT WAS COMPLETED;HEART SOUNDS ARE REGULAR IN RATE AND RHYTHM;LUNG
SOUNDS ARE CLEAR AT THIS TIME;RESPIRATIONS ARE EVEN AND UNLABORED ON RA;PT HAS
1+EDEMA IN BILATERAL EXTREMETIES;LOPEZ CATHETER IN PLACE DRAINING LINDA
URINE;DOUBLE LUMEN PICC LINE IN UPPER RIGHT ARM IS SL, PATENT AND FREE OF
COMPLICATIONS AT THIS TIME;PT IS ON TELE WITH READING OF SR@70BPM;SAFETY
PRECAUTIONS IN PLACE;CALL LIGHT WITHIN REACH;WILL CONTINUE TO MONITOR.

## 2021-03-10 NOTE — NUR
PT WAS FOUND SITTING IN BEDSIDE CHAIR EATING LUNCH;PT HAS NO REPORTS OF PAIN
AT THIS TIME;TELE IS IN PLACE;LOPEZ CATHETER IS IN PLACE DRAINING LINDA
URINE;SAFETY PRECAUTIONS IN PLACE;CALL LIGHT WITHIN REACH;BED IN LOWEST
POSITION;WILL CONTINUE TO MONITOR.

## 2021-03-10 NOTE — NUR
PT WAS FOUND RESTING IN BED;PT HAS NO REPORTS OF PAIN AT THIS TIME;TELE IS IN
PLACE READING SR @87BPM;LOPEZ IS IN PLACE DRAINING YELLOW URINE;DOUBLE LUMEN
PICC LINE IN RIGHT UPPER ARM IS SL, PATENT AND FREE OF COMPLICATIONS AT THIS
TIME;SAFETY PRECAUTIONS IN PLACE;CALL LIGHT WITHIN REACH;BED IN LOWEST
POSITION;WILL CONTINUE TO MONITOR.

## 2021-03-11 VITALS — DIASTOLIC BLOOD PRESSURE: 60 MMHG | SYSTOLIC BLOOD PRESSURE: 137 MMHG

## 2021-03-11 VITALS — SYSTOLIC BLOOD PRESSURE: 156 MMHG | DIASTOLIC BLOOD PRESSURE: 80 MMHG

## 2021-03-11 VITALS — SYSTOLIC BLOOD PRESSURE: 128 MMHG | DIASTOLIC BLOOD PRESSURE: 68 MMHG

## 2021-03-11 VITALS — DIASTOLIC BLOOD PRESSURE: 74 MMHG | SYSTOLIC BLOOD PRESSURE: 159 MMHG

## 2021-03-11 VITALS — DIASTOLIC BLOOD PRESSURE: 72 MMHG | SYSTOLIC BLOOD PRESSURE: 167 MMHG

## 2021-03-11 VITALS — SYSTOLIC BLOOD PRESSURE: 141 MMHG | DIASTOLIC BLOOD PRESSURE: 75 MMHG

## 2021-03-11 VITALS — SYSTOLIC BLOOD PRESSURE: 154 MMHG | DIASTOLIC BLOOD PRESSURE: 73 MMHG

## 2021-03-11 LAB
ALBUMIN SERPL-MCNC: 3 G/DL (ref 3.2–5)
ALP SERPL-CCNC: 132 U/L (ref 38–126)
ANION GAP SERPL CALCULATED.3IONS-SCNC: 8 MMOL/L
AST SERPL-CCNC: 22 U/L (ref 19–48)
BASOPHILS NFR BLD AUTO: 1 % (ref 0–3)
BUN SERPL-MCNC: 42 MG/DL (ref 8–23)
BUN/CREAT SERPL: 38
CHLORIDE SERPL-SCNC: 100 MMOL/L (ref 95–108)
CO2 SERPL-SCNC: 29 MMOL/L (ref 22–30)
CREAT SERPL-MCNC: 1.1 MG/DL (ref 0.7–1.3)
EOSINOPHIL NFR BLD AUTO: 3 % (ref 0–8)
ERYTHROCYTE [DISTWIDTH] IN BLOOD BY AUTOMATED COUNT: 18.6 % (ref 11.5–15.5)
HCT VFR BLD AUTO: 30.4 % (ref 39–50)
HGB BLD-MCNC: 9.1 G/DL (ref 14–18)
IMM GRANULOCYTES NFR BLD: 0.5 % (ref 0–5)
LYMPHOCYTES NFR BLD: 25 % (ref 15–41)
MCH RBC QN AUTO: 26.5 PG  CALC (ref 26–32)
MCHC RBC AUTO-ENTMCNC: 29.9 G/DL CAL (ref 32–36)
MCV RBC AUTO: 88.6 FL  CALC (ref 80–100)
MONOCYTES NFR BLD AUTO: 11 % (ref 2–13)
NEUTROPHILS # BLD AUTO: 2.28 THOU/UL (ref 1.82–7.42)
NEUTROPHILS NFR BLD AUTO: 61 % (ref 42–76)
PLATELET # BLD AUTO: 143 THOU/UL (ref 130–400)
POTASSIUM SERPL-SCNC: 4.5 MMOL/L (ref 3.5–5.1)
PROT SERPL-MCNC: 6 G/DL (ref 6.3–8.2)
RBC # BLD AUTO: 3.43 MILL/UL (ref 4.7–6.1)
SODIUM SERPL-SCNC: 133 MMOL/L (ref 137–146)

## 2021-03-11 NOTE — NUR
PT C/O OF SOB WHILE SUPINE. PLACED PT IN CHAIR. PT STTES HE FEELS MUCH BETTER.
VITALS TAKEN AND WIITHIN NORMAL PARAMETERS. WILL CONTINUE TO MONITOR.

## 2021-03-11 NOTE — NUR
PT WAS FOUND RESTING IN BED;PT IS ALERT AND ORIENTED;LOPEZ IS IN PLACE;TELE IS
IN PLACE;DOUBLE LUMEN PICC LINE IN RT UPPER ARM IS SL, PATENT AND FREE OF
COMPLICATIONS AT THIS TIME;SAFETY PRECAUTIONS IN PLACE;CALL LIGHT WITHIN
REACH;BED IN LOWEST POSITION;WILL CONTINUE TO MONITOR.

## 2021-03-11 NOTE — NUR
P.T. note
pt seen for gait training. CGA to achieve standing position at RW then pt
ambulated 40ft. After a seated rest break pt ambulated 50ft, then after
another break ambulated another 50ft. No LOB occured during ambulation however
pts endurance is poor and he would benefit from a stay at inpatient rehab.
ampac= 11

## 2021-03-11 NOTE — NUR
PT WAS FOUND SITTING IN BEDSIDE CHAIR EATING BREAKFAST;PT IS A&O X3;PT REPORTS
NO PAIN AT THIS TIME;ASSESSMENT WAS COMPLETED;HEART SOUNDS ARE REGULAR IN RATE
AND RHYTHM;LUNG SOUNDS ARE CLEAR TO AUSCULTATION;RESPIRATIONS ARE EVEN AND
UNLABORED ON RA;PT IS REPORTING SOME SOB AND IS WANTING A DUONEB
TREATMENT;RESPIRATORY IS AWARE AND COMING TO ADMINISTER TREATMENT;TELE IS IN
PLACE READING SR @77BPM;LOPEZ IS IN PLACE WITH NO COMPLICATIONS DRAINING
LINDA URINE;ACCUCHECK READING ;PT WAS COVERED WITH 1 UNIT OF
INSULIN;DOUBLE LUMEN PICC LINE IN RT UPPER ARM IS SL, PATENT AND FREE OF
COMPLICATIONS AT THIS TIME;SAFETY PRECAUTIONS IN PLACE;CALL LIGHT WITHIN
REACH;BED IN LOWEST POSITION;WILL CONTINUE TO MONITOR.

## 2021-03-11 NOTE — NUR
RRT CALLED FOR PRJN NEB. PT ON RA, C C/O CHEST PAIN WHEN LAYING FLAT. RRT
ADMIN NEBULAIZED AEROSOLIZED BRONCHODILATOR THERAPY. RRT TO MONITOR. Spine appears normal, range of motion is not limited, no muscle or joint tenderness

## 2021-03-11 NOTE — NUR
PT WAS FOUND RESTING IN BED;PT REPORTED PAIN IN HIS SCROTAL AREA;AREA IS RED
AND EXCORIATED;PT ASKED FOR BABY POWDER TO BE PLACED ON IT;SITE WAS COVERED
WITH POWDER TO PREVENT THE CHAFFING;TELE IS IN PLACE;LOPEZ IS IN PLACE;DOUBLE
LUMEN PICC LINE IS SL, PATENT AND FREE OF COMPLICATIONS AT THIS TIME;SAFETY
PRECAUTIONS IN PLACE;CALL LIGHT WITHIN REACH;BED IN LOWEST POSITION;WILL
CONTINUE TO MONITOR.

## 2021-03-12 VITALS — SYSTOLIC BLOOD PRESSURE: 168 MMHG | DIASTOLIC BLOOD PRESSURE: 80 MMHG

## 2021-03-12 VITALS — SYSTOLIC BLOOD PRESSURE: 149 MMHG | DIASTOLIC BLOOD PRESSURE: 71 MMHG

## 2021-03-12 VITALS — SYSTOLIC BLOOD PRESSURE: 147 MMHG | DIASTOLIC BLOOD PRESSURE: 70 MMHG

## 2021-03-12 VITALS — DIASTOLIC BLOOD PRESSURE: 85 MMHG | SYSTOLIC BLOOD PRESSURE: 176 MMHG

## 2021-03-12 VITALS — SYSTOLIC BLOOD PRESSURE: 158 MMHG | DIASTOLIC BLOOD PRESSURE: 78 MMHG

## 2021-03-12 VITALS — DIASTOLIC BLOOD PRESSURE: 82 MMHG | SYSTOLIC BLOOD PRESSURE: 175 MMHG

## 2021-03-12 LAB
ANION GAP SERPL CALCULATED.3IONS-SCNC: 8 MMOL/L
BUN SERPL-MCNC: 40 MG/DL (ref 8–23)
BUN/CREAT SERPL: 36
CHLORIDE SERPL-SCNC: 100 MMOL/L (ref 95–108)
CO2 SERPL-SCNC: 27 MMOL/L (ref 22–30)
CREAT SERPL-MCNC: 1.1 MG/DL (ref 0.7–1.3)
ERYTHROCYTE [DISTWIDTH] IN BLOOD BY AUTOMATED COUNT: 18.2 % (ref 11.5–15.5)
HCT VFR BLD AUTO: 28.5 % (ref 39–50)
HGB BLD-MCNC: 8.7 G/DL (ref 14–18)
MCH RBC QN AUTO: 26.9 PG  CALC (ref 26–32)
MCHC RBC AUTO-ENTMCNC: 30.5 G/DL CAL (ref 32–36)
MCV RBC AUTO: 88.2 FL  CALC (ref 80–100)
PLATELET # BLD AUTO: 123 THOU/UL (ref 130–400)
POTASSIUM SERPL-SCNC: 4.3 MMOL/L (ref 3.5–5.1)
RBC # BLD AUTO: 3.23 MILL/UL (ref 4.7–6.1)
SODIUM SERPL-SCNC: 131 MMOL/L (ref 137–146)

## 2021-03-12 NOTE — NUR
PT LAYING, NO APPARENT DISTRESS, RESPIRATIONS REGULAR AND
UNLABORED.PT REPORTS HIS BREATHING IS "FINE" WHEN ASKED. STATES "I'M JUST SO
TIRED", PT ENCOURAGED TO MAXIMIXE TIME OF UNINTERRUPTED REST BETWEEN CARE. PT
STATES "LUIZ BEEN SLEEPING ALL DAY". AFFECT IS FLAT. PT SEEMS TO BE IN
DEPRESSED MOOD. PHYSICAL ASSESMENT COMPLETED, SEE SHIFT ASSESSMENT. EDUCATION
ON PLAN OF CARE PROVIDED. PT VERBALIZES UNDERSTANDING AND DENIES QUESTIONS.
PICC TO DEEPAK FLUSHES W/O DIFFICULTY AND HAS BLOOD RETURN, SITE PATENT, DRESSING
SECURE. PT IS ABLE TO MAKE NEEDS KNOWN AND DENIES ANY NEEDS AT THIS TIME. CALL
BELL WITHIN REACH, AGREES TO CALL PRN. BED LOCKED IN LOW POSITION WITH
BEDRAILS UP X2.

## 2021-03-12 NOTE — NUR
DR ANDRADE NOTIFIED OF ELEVATED BP, ORDER OBTAINED FOR 10 MG APRESOLINE IV
Q 6HRS PRN FOR SPB OF GREATER THAN 170. ORDER WRITTEN AND FAXED TO PHARMACY.

## 2021-03-12 NOTE — NUR
Pt seen this am for treatment. He was OOB in chair, nursing giving meds. He
c/o LE edema. Pt stated he needed to use bed rails to get supine to sit. Sit
to and from stand with CGA/min assist. Gait 2x60' with CGA and standing rest
required 1-2 times each walk. BLE ex performed in sitting 2 x 10 reps each.
Pt left in chair with call bell/phone/bedside table in reach. Gait belt/non
skid socks on during treatment. [facility] 14.

## 2021-03-12 NOTE — NUR
PT RESTING IN HIS CHAIR, NO SIGNS OF DISTRESS NOTED, RESP EVEN AND UNLABORED.
PT VOICES NO NEEDS OR COMPLAINTS AT THIS TIME. CALL LIGHT IN REACH, CONTINUE
TO MONITOR.

## 2021-03-12 NOTE — NUR
PT LAYING IN BED. NO NEEDS VOICED AT THIS TIME. PT REPORTS "ITCHING RELIEF"
AFTER MEDICATION ADMINISTRATION. CALL LIGHT WITHIN REACH.

## 2021-03-12 NOTE — NUR
PT SITTING IN RECLINER EATING BREAKFAST. A&O X3. NO DISTRESS NOTED.
CLEAR/DIMINISHED BREATH SOUNDS HEARD UPON AUSCULTATION. DOUBLE LUMEN MIDLINE
IN PLACE. BOTH LUMENS FLUSH AND DRAW BACK BLOOD WITH EASE. LUMENS FLUSHED WITH
10 ML OF NS. +1 EDEMA NOTED TO BLE. ASSESSMENT COMPLETED. DISCUSSED POC. CALL
LIGHT LEFT WITHIN REACH.

## 2021-03-13 VITALS — SYSTOLIC BLOOD PRESSURE: 84 MMHG | DIASTOLIC BLOOD PRESSURE: 51 MMHG

## 2021-03-13 VITALS — DIASTOLIC BLOOD PRESSURE: 70 MMHG | SYSTOLIC BLOOD PRESSURE: 136 MMHG

## 2021-03-13 VITALS — DIASTOLIC BLOOD PRESSURE: 70 MMHG | SYSTOLIC BLOOD PRESSURE: 151 MMHG

## 2021-03-13 VITALS — SYSTOLIC BLOOD PRESSURE: 154 MMHG | DIASTOLIC BLOOD PRESSURE: 70 MMHG

## 2021-03-13 VITALS — SYSTOLIC BLOOD PRESSURE: 157 MMHG | DIASTOLIC BLOOD PRESSURE: 75 MMHG

## 2021-03-13 VITALS — DIASTOLIC BLOOD PRESSURE: 80 MMHG | SYSTOLIC BLOOD PRESSURE: 164 MMHG

## 2021-03-13 VITALS — SYSTOLIC BLOOD PRESSURE: 139 MMHG | DIASTOLIC BLOOD PRESSURE: 69 MMHG

## 2021-03-13 LAB
ANION GAP SERPL CALCULATED.3IONS-SCNC: 9 MMOL/L
BUN SERPL-MCNC: 40 MG/DL (ref 8–23)
BUN/CREAT SERPL: 34
CHLORIDE SERPL-SCNC: 100 MMOL/L (ref 95–108)
CO2 SERPL-SCNC: 28 MMOL/L (ref 22–30)
CREAT SERPL-MCNC: 1.2 MG/DL (ref 0.7–1.3)
ERYTHROCYTE [DISTWIDTH] IN BLOOD BY AUTOMATED COUNT: 18.2 % (ref 11.5–15.5)
HCT VFR BLD AUTO: 29.5 % (ref 39–50)
HGB BLD-MCNC: 8.8 G/DL (ref 14–18)
MAGNESIUM SERPL-MCNC: 2.1 MG/DL (ref 1.6–2.3)
MCH RBC QN AUTO: 26.3 PG  CALC (ref 26–32)
MCHC RBC AUTO-ENTMCNC: 29.8 G/DL CAL (ref 32–36)
MCV RBC AUTO: 88.3 FL  CALC (ref 80–100)
PLATELET # BLD AUTO: 127 THOU/UL (ref 130–400)
POTASSIUM SERPL-SCNC: 4.6 MMOL/L (ref 3.5–5.1)
RBC # BLD AUTO: 3.34 MILL/UL (ref 4.7–6.1)
SODIUM SERPL-SCNC: 132 MMOL/L (ref 137–146)

## 2021-03-13 NOTE — NUR
PATIENT IS ALERT AND ORIENTED X3. ABLE TO MAKE NEEDS KNOWN. APPEARS LETHARGIC.
OPENS EYES TO VOICE. RESPIRATIONS EASY ON ROOM AIR. ON TELEMETRY SR IN THE
80S. SKIN WARM AND DRY. LOPEZ CATHETER DRAINING YELLOW URINE TO GRAVITY. SKIN
IS VERY TIGHT WITHOUT PITTING EDEMA. CURRENTLY RESTING WITH EYES CLOSED. BED
IN LOW POSITION. CALL LIGHT WITHIN REACH.

## 2021-03-13 NOTE — NUR
PT SITTING UP IN RECLINER, C/O ITCHINESS AND REQUESTS VISTARIL. PRN VISTARIL
ADMINISTERED, SEE E-MAR.

## 2021-03-13 NOTE — NUR
SHIFT CHANGE REPORT, PT AWAKE ALERT AND ORIENTED SITTING UP IN RECLINER, DENES
DISCOMFORT, TELE MONITOR IN PLACE, CALL BELL IN REACH.

## 2021-03-13 NOTE — NUR
PT APPEARS TO BE SLEEPING COMFORTABLY, LAYING IN BED WITH EYES CLOSED,
RESPIRATIONS REGULAR AND UNLABORED, NO APPARENT DISTRESS. CALL GREENFIELD REMAINS
WITHIN REACH. BED REMAINS LOCKED IN LOW POSITION WITH BEDRAILS UP X2.

## 2021-03-14 VITALS — SYSTOLIC BLOOD PRESSURE: 148 MMHG | DIASTOLIC BLOOD PRESSURE: 77 MMHG

## 2021-03-14 VITALS — SYSTOLIC BLOOD PRESSURE: 132 MMHG | DIASTOLIC BLOOD PRESSURE: 61 MMHG

## 2021-03-14 VITALS — SYSTOLIC BLOOD PRESSURE: 156 MMHG | DIASTOLIC BLOOD PRESSURE: 64 MMHG

## 2021-03-14 VITALS — DIASTOLIC BLOOD PRESSURE: 80 MMHG | SYSTOLIC BLOOD PRESSURE: 150 MMHG

## 2021-03-14 VITALS — DIASTOLIC BLOOD PRESSURE: 60 MMHG | SYSTOLIC BLOOD PRESSURE: 140 MMHG

## 2021-03-14 VITALS — DIASTOLIC BLOOD PRESSURE: 71 MMHG | SYSTOLIC BLOOD PRESSURE: 155 MMHG

## 2021-03-14 LAB
ANION GAP SERPL CALCULATED.3IONS-SCNC: 8 MMOL/L
BUN SERPL-MCNC: 42 MG/DL (ref 8–23)
BUN/CREAT SERPL: 35
CHLORIDE SERPL-SCNC: 100 MMOL/L (ref 95–108)
CO2 SERPL-SCNC: 30 MMOL/L (ref 22–30)
CREAT SERPL-MCNC: 1.2 MG/DL (ref 0.7–1.3)
POTASSIUM SERPL-SCNC: 4.4 MMOL/L (ref 3.5–5.1)
SODIUM SERPL-SCNC: 133 MMOL/L (ref 137–146)

## 2021-03-14 NOTE — NUR
ASSISTED WITH SETUP FOR SHOWER, SHOWERED AND SUPERVISED BACK TO RECLINER TO
PREPARE FOR MEAL, CALL GREENFIELD IN REACH.

## 2021-03-14 NOTE — NUR
SHIFT CHANGE REPORT, PT AWAKE ALERT AND ORIENTED SITTING ON BCS AT THIS TIME,
NO C/O DISCOMFORT, TELE MONITOR IN PLACE, CALL BELL IN REACH.

## 2021-03-14 NOTE — NUR
PATIENT IS ALERT AND ORIENTED X3. ABLE TO MAKE NEEDS KNOWN. RESPIRATINS EASY
ON ROOM AIR. SKIN WARM DRY AND TIGHT. ON TELEMETRY RUNNING SR AT 90 AT 2000.
C/O PAIN AND ITCHING ADDRESSED WITH PRN TYLENOL AND HYDROXAZINE WITH POSITIVE
EFFECT. LOPEZ CATHETER PATENT DRAINING YELLOW CLEAR URINE TO GRAVITY. ABDOMEN
SOFT AND DISTENDED. RASH TO GROIN NOTED. NYSTATIN APPLIED AS ORDERED. BED IN
LOW POSITION. CALL LIGHT WITHIN REACH.

## 2021-03-15 VITALS — DIASTOLIC BLOOD PRESSURE: 69 MMHG | SYSTOLIC BLOOD PRESSURE: 133 MMHG

## 2021-03-15 VITALS — DIASTOLIC BLOOD PRESSURE: 73 MMHG | SYSTOLIC BLOOD PRESSURE: 141 MMHG

## 2021-03-15 VITALS — DIASTOLIC BLOOD PRESSURE: 68 MMHG | SYSTOLIC BLOOD PRESSURE: 140 MMHG

## 2021-03-15 VITALS — DIASTOLIC BLOOD PRESSURE: 64 MMHG | SYSTOLIC BLOOD PRESSURE: 141 MMHG

## 2021-03-15 VITALS — SYSTOLIC BLOOD PRESSURE: 153 MMHG | DIASTOLIC BLOOD PRESSURE: 77 MMHG

## 2021-03-15 VITALS — SYSTOLIC BLOOD PRESSURE: 160 MMHG | DIASTOLIC BLOOD PRESSURE: 78 MMHG

## 2021-03-15 LAB
ANION GAP SERPL CALCULATED.3IONS-SCNC: 6 MMOL/L
BUN SERPL-MCNC: 42 MG/DL (ref 8–23)
BUN/CREAT SERPL: 36
CHLORIDE SERPL-SCNC: 105 MMOL/L (ref 95–108)
CO2 SERPL-SCNC: 28 MMOL/L (ref 22–30)
CREAT SERPL-MCNC: 1.2 MG/DL (ref 0.7–1.3)
ERYTHROCYTE [DISTWIDTH] IN BLOOD BY AUTOMATED COUNT: 17.9 % (ref 11.5–15.5)
HCT VFR BLD AUTO: 26.6 % (ref 39–50)
HGB BLD-MCNC: 8 G/DL (ref 14–18)
MCH RBC QN AUTO: 26.5 PG  CALC (ref 26–32)
MCHC RBC AUTO-ENTMCNC: 30.1 G/DL CAL (ref 32–36)
MCV RBC AUTO: 88.1 FL  CALC (ref 80–100)
PLATELET # BLD AUTO: 107 THOU/UL (ref 130–400)
POTASSIUM SERPL-SCNC: 4.2 MMOL/L (ref 3.5–5.1)
RBC # BLD AUTO: 3.02 MILL/UL (ref 4.7–6.1)
SODIUM SERPL-SCNC: 135 MMOL/L (ref 137–146)

## 2021-03-15 NOTE — NUR
PATIENT LAYING IN BED AT THIS TIME. PATIENT COMPLAINING OF ACID REFLUX AND
PATIENT GIVEN 20MG OF PEPCID AT THIS TIME. PATIENT SIDERAILS ARE UP X 2 CALL
LIGHT IS WITHIN REACH AND PERSONAL ITEMS. PATIENT RIGHT UPPER DOUBLE LUMEN
PICC LINE FLUSHED AT THIS TIME AND BLOOD RETURNED IN BOTH LINES. PATIENT STILL
EXHIBITS A RASH UNDER ABDOMNIAL BELLY FOLDS. PATIENT DENEIS ANY OTHER NEEDS AT
THIS TIME.

## 2021-03-15 NOTE — NUR
PATIENT SITTING UP IN CHAIR. PATIENT DENIES PAIN AT THIS TIME. LOPEZ PATENT
AND DRAINING YELLOW SEDIMENT URINE. RN ASSESSMENT DONE AT THIS TIME SEE
INTERVENTIONS. CALL LIGHT AND PERSONAL ITEMS ARE WITHIN REACH.

## 2021-03-15 NOTE — NUR
PT Note
Mr Hanks seated in reclining chair as entered room, patient agreed to
participate in therapy session.  Sit>stand (SBA), verbal cues for correct hand
placement as he ascends to a standing position.  Gait training w/ FWW, x 100
ft, no loss of balance noted, verbal cues for correct staning posture.
Stand>sit (SBA) verbal cues for correct hand placement as he decends to a
seated position.  Patient seated in chair w/ tray table and call bell by
patient side as exited room.
[facility] 6 score of 17--home

## 2021-03-15 NOTE — NUR
PATIENT COMPLAINING OF ITCHING AT THIS TIME. NO RASH NOTED. PATIENT DOES HAVE
EXCORREATION UNDER ABDOMINAL FOLD AND NYSTATIN POWDER WAS PLACED AT THIS TIME.
PATIENT WAS ALSO GIVEN VISTRIL 25MG FOR ITCHING. CALL LIGHT IS WITHIN REACH
AND PERSONAL ITEMS.

## 2021-03-16 VITALS — SYSTOLIC BLOOD PRESSURE: 158 MMHG | DIASTOLIC BLOOD PRESSURE: 79 MMHG

## 2021-03-16 VITALS — DIASTOLIC BLOOD PRESSURE: 67 MMHG | SYSTOLIC BLOOD PRESSURE: 141 MMHG

## 2021-03-16 VITALS — SYSTOLIC BLOOD PRESSURE: 155 MMHG | DIASTOLIC BLOOD PRESSURE: 72 MMHG

## 2021-03-16 VITALS — DIASTOLIC BLOOD PRESSURE: 80 MMHG | SYSTOLIC BLOOD PRESSURE: 156 MMHG

## 2021-03-16 VITALS — SYSTOLIC BLOOD PRESSURE: 147 MMHG | DIASTOLIC BLOOD PRESSURE: 71 MMHG

## 2021-03-16 VITALS — SYSTOLIC BLOOD PRESSURE: 124 MMHG | DIASTOLIC BLOOD PRESSURE: 67 MMHG

## 2021-03-16 LAB
ANION GAP SERPL CALCULATED.3IONS-SCNC: 10 MMOL/L
BUN SERPL-MCNC: 46 MG/DL (ref 8–23)
BUN/CREAT SERPL: 29
CHLORIDE SERPL-SCNC: 100 MMOL/L (ref 95–108)
CO2 SERPL-SCNC: 30 MMOL/L (ref 22–30)
CREAT SERPL-MCNC: 1.6 MG/DL (ref 0.7–1.3)
ERYTHROCYTE [DISTWIDTH] IN BLOOD BY AUTOMATED COUNT: 17.9 % (ref 11.5–15.5)
HCT VFR BLD AUTO: 27.6 % (ref 39–50)
HGB BLD-MCNC: 8.3 G/DL (ref 14–18)
MAGNESIUM SERPL-MCNC: 2 MG/DL (ref 1.6–2.3)
MCH RBC QN AUTO: 26.5 PG  CALC (ref 26–32)
MCHC RBC AUTO-ENTMCNC: 30.1 G/DL CAL (ref 32–36)
MCV RBC AUTO: 88.2 FL  CALC (ref 80–100)
PLATELET # BLD AUTO: 130 THOU/UL (ref 130–400)
POTASSIUM SERPL-SCNC: 4.8 MMOL/L (ref 3.5–5.1)
RBC # BLD AUTO: 3.13 MILL/UL (ref 4.7–6.1)
SODIUM SERPL-SCNC: 134 MMOL/L (ref 137–146)

## 2021-03-16 NOTE — NUR
LOPEZ CATHETER WAS REMOVED AND PT TOLERATED WELL.  CATHETER TIP INTACT.
[id number] OF LINDA CLOUDY URINE.  WILL CONTINUE TO OBSERVE THIS PT FOR
URINE OUTPUT.  WILL CONTINUE TO OBSERVE.

## 2021-03-16 NOTE — NUR
BACK TO BED WITH ASST. SIDE RAILS UP CALL LIGHT IN REACH BED LOCKED IN LOW
POSITION, WILL CONTINUE TO MONIOTR THE PATIENT.

## 2021-03-16 NOTE — NUR
PT note
A.M and P.M. attempt made to see patient however he reported he was too tired
from taking a shower to participate

## 2021-03-16 NOTE — NUR
PT AX0X3 , RESTING IN THE BEDSIDE CHAIR. LOPEZ TO CHAIR SIDE DRAINAGE. O2 RA ,
DENIES PAIN. REINFORCED FLUID RESTRICTION. REPOSITIONED FOR COMFORT, CALL
LIGHT IN REACH. ALL SAFTY MEASURES IN PLACE. WILL CONTINUE TO MONIOTR THE
PATIENT.

## 2021-03-16 NOTE — NUR
PT RESTING COMFORTABLE IN THE BED, LOPEZ TO BEDSIDE DRAINAGE. DENIES PAIN .
REPOSITIOEND FOR COMFORT, SIDE RAILS UP CALL LIGHT IN REACH BED LOCKED IN LOW
POSITION, WILL CONTINUE TO MONITOR.

## 2021-03-16 NOTE — NUR
SITTING UP ON EDGE OF BED EATING DINNER. HAS COMPLAINTS ABOUT HIS LOPEZ
CATHETER REGARDING ABRASIONS FORMING TO THIGHS FROM LEG STRAP AND DISCOMFORT
TO GROIN AND AT CATHETER INSERTION SITE; LEG STRAP REMOVED AND LOPEZ NOW
SECURED WITH PAPER TAPE. [facility] PROVIDED AND NYSTATIN POWDER REAPPLIED TO
GROIN. 1 UNIT GIVEN FOR ACCU CHECK . CRACKLES AUSCULTATED TO BASES OF
LUNGS. NO OTHER REQUESTS OR COMPLAINTS. SAFETY MEASURES IN PLACE; CALL LIGHT
WITHIN REACH.

## 2021-03-17 VITALS — DIASTOLIC BLOOD PRESSURE: 76 MMHG | SYSTOLIC BLOOD PRESSURE: 156 MMHG

## 2021-03-17 VITALS — SYSTOLIC BLOOD PRESSURE: 157 MMHG | DIASTOLIC BLOOD PRESSURE: 77 MMHG

## 2021-03-17 VITALS — DIASTOLIC BLOOD PRESSURE: 78 MMHG | SYSTOLIC BLOOD PRESSURE: 154 MMHG

## 2021-03-17 LAB
ANION GAP SERPL CALCULATED.3IONS-SCNC: 11 MMOL/L
BUN SERPL-MCNC: 46 MG/DL (ref 8–23)
BUN/CREAT SERPL: 34
CHLORIDE SERPL-SCNC: 100 MMOL/L (ref 95–108)
CO2 SERPL-SCNC: 29 MMOL/L (ref 22–30)
CREAT SERPL-MCNC: 1.4 MG/DL (ref 0.7–1.3)
ERYTHROCYTE [DISTWIDTH] IN BLOOD BY AUTOMATED COUNT: 17.4 % (ref 11.5–15.5)
HCT VFR BLD AUTO: 29.6 % (ref 39–50)
HGB BLD-MCNC: 8.9 G/DL (ref 14–18)
MCH RBC QN AUTO: 26.3 PG  CALC (ref 26–32)
MCHC RBC AUTO-ENTMCNC: 30.1 G/DL CAL (ref 32–36)
MCV RBC AUTO: 87.6 FL  CALC (ref 80–100)
PLATELET # BLD AUTO: 137 THOU/UL (ref 130–400)
POTASSIUM SERPL-SCNC: 4.5 MMOL/L (ref 3.5–5.1)
RBC # BLD AUTO: 3.38 MILL/UL (ref 4.7–6.1)
SODIUM SERPL-SCNC: 135 MMOL/L (ref 137–146)

## 2021-03-17 NOTE — NUR
PT SITTING UP IN BED WITH EYES OPEN AND ABLE TO MAKE NEEDS KNOWN.  SKIN WARM
TO TOUCH.  CONTINENT OF B/B AND USING THE URINAL WITH NO COMPLICATIONS NOTED.
DENIES BURINING ON URINATION SINCE CATHETER WAS REMOVED.  RESPIRATION EVEN AND
NON LABORED.  CALL LIGHT WITHIN REACH.  WILL CONTINUE TO OBSERVE.

## 2021-03-17 NOTE — NUR
PATIENT UP IN CHAIR AT THIS TIME DENIES PAIN. PATIENT STATE "I'M GLAD THE
LOPEZ IS OUT AT THIS TIME". RN ASSESSMENT DONE AT THIS TIME. LUNGS SOUNDS ARE
CLEAR IN THE UPPER AND MID LUNG FIELDS AND DIMINISHED IN LOWER BASIS, PATIENT
DOES EXHIBIT BILATERA LOWER EDEMA 1+. CALL LIGHT IS WITHIN REACH AND PERSONAL
ITEMS. SIDERAILS ON BED UP X 2.

## 2021-03-17 NOTE — NUR
PT NOTE
Patient was seated in chair as entered room, patient agreed to participate in
therspy session.  Sit>stand (independant), ambulated 150ft w/
FWW(independant), no loss of balance noted.  Stand>sit(independant). slight
verbal cues for proper hand placemnt as he decends to a seated position.
[facility] 6 of 18--home

## 2021-03-17 NOTE — NUR
Discharge instructions given. Patient verbalizes understanding of same.
Discharged in stable condition via Wheelchair to Home with
*Other. All belongings sent with pt.

## 2021-03-20 ENCOUNTER — HOSPITAL ENCOUNTER (INPATIENT)
Dept: HOSPITAL 82 - ED | Age: 61
LOS: 4 days | Discharge: HOME HEALTH | DRG: 292 | End: 2021-03-24
Attending: INTERNAL MEDICINE | Admitting: INTERNAL MEDICINE
Payer: MEDICARE

## 2021-03-20 VITALS — BODY MASS INDEX: 36.73 KG/M2 | HEIGHT: 71 IN | WEIGHT: 262.35 LBS

## 2021-03-20 DIAGNOSIS — Z86.73: ICD-10-CM

## 2021-03-20 DIAGNOSIS — I48.91: ICD-10-CM

## 2021-03-20 DIAGNOSIS — Z85.819: ICD-10-CM

## 2021-03-20 DIAGNOSIS — E87.5: ICD-10-CM

## 2021-03-20 DIAGNOSIS — Z92.3: ICD-10-CM

## 2021-03-20 DIAGNOSIS — E11.43: ICD-10-CM

## 2021-03-20 DIAGNOSIS — B37.2: ICD-10-CM

## 2021-03-20 DIAGNOSIS — Z95.3: ICD-10-CM

## 2021-03-20 DIAGNOSIS — Z20.822: ICD-10-CM

## 2021-03-20 DIAGNOSIS — K31.84: ICD-10-CM

## 2021-03-20 DIAGNOSIS — Z86.16: ICD-10-CM

## 2021-03-20 DIAGNOSIS — Z87.891: ICD-10-CM

## 2021-03-20 DIAGNOSIS — I11.0: Primary | ICD-10-CM

## 2021-03-20 DIAGNOSIS — Z95.5: ICD-10-CM

## 2021-03-20 DIAGNOSIS — Z79.4: ICD-10-CM

## 2021-03-20 DIAGNOSIS — N40.0: ICD-10-CM

## 2021-03-20 DIAGNOSIS — E87.1: ICD-10-CM

## 2021-03-20 DIAGNOSIS — E78.5: ICD-10-CM

## 2021-03-20 DIAGNOSIS — K21.9: ICD-10-CM

## 2021-03-20 DIAGNOSIS — D64.9: ICD-10-CM

## 2021-03-20 DIAGNOSIS — I50.23: ICD-10-CM

## 2021-03-20 DIAGNOSIS — Z95.1: ICD-10-CM

## 2021-03-20 DIAGNOSIS — I25.118: ICD-10-CM

## 2021-03-20 DIAGNOSIS — Z92.21: ICD-10-CM

## 2021-03-20 LAB
ALBUMIN SERPL-MCNC: 3.6 G/DL (ref 3.2–5)
ALP SERPL-CCNC: 193 U/L (ref 38–126)
ANION GAP SERPL CALCULATED.3IONS-SCNC: 10 MMOL/L
AST SERPL-CCNC: 39 U/L (ref 19–48)
BASOPHILS NFR BLD AUTO: 1 % (ref 0–3)
BILIRUB UR QL STRIP.AUTO: NEGATIVE
BUN SERPL-MCNC: 47 MG/DL (ref 8–23)
BUN/CREAT SERPL: 38
CHLORIDE SERPL-SCNC: 105 MMOL/L (ref 95–108)
CO2 SERPL-SCNC: 30 MMOL/L (ref 22–30)
COLOR UR AUTO: YELLOW
CREAT SERPL-MCNC: 1.2 MG/DL (ref 0.7–1.3)
EOSINOPHIL NFR BLD AUTO: 2 % (ref 0–8)
ERYTHROCYTE [DISTWIDTH] IN BLOOD BY AUTOMATED COUNT: 17.2 % (ref 11.5–15.5)
GLUCOSE UR STRIP.AUTO-MCNC: NEGATIVE MG/DL
HCT VFR BLD AUTO: 30.5 % (ref 39–50)
HGB BLD-MCNC: 9 G/DL (ref 14–18)
HGB UR QL STRIP.AUTO: (no result)
IMM GRANULOCYTES NFR BLD: 0.6 % (ref 0–5)
INR PPP: 1.1 RATIO (ref 0.7–1.3)
KETONES UR STRIP.AUTO-MCNC: NEGATIVE MG/DL
LEUKOCYTE ESTERASE UR QL STRIP.AUTO: NEGATIVE
LYMPHOCYTES NFR BLD: 21 % (ref 15–41)
MCH RBC QN AUTO: 26.2 PG  CALC (ref 26–32)
MCHC RBC AUTO-ENTMCNC: 29.5 G/DL CAL (ref 32–36)
MCV RBC AUTO: 88.7 FL  CALC (ref 80–100)
MONOCYTES NFR BLD AUTO: 9 % (ref 2–13)
NEUTROPHILS # BLD AUTO: 3.35 THOU/UL (ref 1.82–7.42)
NEUTROPHILS NFR BLD AUTO: 67 % (ref 42–76)
NITRITE UR QL STRIP.AUTO: NEGATIVE
PH UR STRIP.AUTO: 6.5 [PH] (ref 4.5–8)
PLATELET # BLD AUTO: 186 THOU/UL (ref 130–400)
POTASSIUM SERPL-SCNC: 5.4 MMOL/L (ref 3.5–5.1)
PROT SERPL-MCNC: 6.9 G/DL (ref 6.3–8.2)
PROT UR QL STRIP.AUTO: >=300 MG/DL
PROTHROMBIN TIME: 10.8 SECONDS (ref 9–12.5)
RBC # BLD AUTO: 3.44 MILL/UL (ref 4.7–6.1)
RBC #/AREA URNS HPF: (no result) RBC/HPF (ref 0–5)
SODIUM SERPL-SCNC: 140 MMOL/L (ref 137–146)
SP GR UR STRIP.AUTO: 1.02
UROBILINOGEN UR QL STRIP.AUTO: 0.2 E.U./DL
WBC #/AREA URNS HPF: (no result) WBC/HPF (ref 0–5)

## 2021-03-20 NOTE — NUR
IV STARTED LABS OBTAINED AND MEDICATED WITH LASIX AS ORDERED, URINAL WITHIN
REACH AND PT INSTRUCTED TO CALL FOR EMPTYING PRN, VERBALIZES UNDERSTANDING,

## 2021-03-21 VITALS — SYSTOLIC BLOOD PRESSURE: 168 MMHG | DIASTOLIC BLOOD PRESSURE: 82 MMHG

## 2021-03-21 VITALS — DIASTOLIC BLOOD PRESSURE: 75 MMHG | SYSTOLIC BLOOD PRESSURE: 153 MMHG

## 2021-03-21 VITALS — DIASTOLIC BLOOD PRESSURE: 70 MMHG | SYSTOLIC BLOOD PRESSURE: 138 MMHG

## 2021-03-21 VITALS — SYSTOLIC BLOOD PRESSURE: 183 MMHG | DIASTOLIC BLOOD PRESSURE: 87 MMHG

## 2021-03-21 VITALS — DIASTOLIC BLOOD PRESSURE: 71 MMHG | SYSTOLIC BLOOD PRESSURE: 149 MMHG

## 2021-03-21 VITALS — DIASTOLIC BLOOD PRESSURE: 94 MMHG | SYSTOLIC BLOOD PRESSURE: 174 MMHG

## 2021-03-21 VITALS — SYSTOLIC BLOOD PRESSURE: 161 MMHG | DIASTOLIC BLOOD PRESSURE: 79 MMHG

## 2021-03-21 LAB
ANION GAP SERPL CALCULATED.3IONS-SCNC: 10 MMOL/L
BASOPHILS NFR BLD AUTO: 1 % (ref 0–3)
BUN SERPL-MCNC: 45 MG/DL (ref 8–23)
BUN/CREAT SERPL: 41
CHLORIDE SERPL-SCNC: 105 MMOL/L (ref 95–108)
CO2 SERPL-SCNC: 26 MMOL/L (ref 22–30)
CREAT SERPL-MCNC: 1.1 MG/DL (ref 0.7–1.3)
EOSINOPHIL NFR BLD AUTO: 4 % (ref 0–8)
ERYTHROCYTE [DISTWIDTH] IN BLOOD BY AUTOMATED COUNT: 17.2 % (ref 11.5–15.5)
HCT VFR BLD AUTO: 27.6 % (ref 39–50)
HGB BLD-MCNC: 8.1 G/DL (ref 14–18)
IMM GRANULOCYTES NFR BLD: 0.4 % (ref 0–5)
LYMPHOCYTES NFR BLD: 17 % (ref 15–41)
MCH RBC QN AUTO: 26 PG  CALC (ref 26–32)
MCHC RBC AUTO-ENTMCNC: 29.3 G/DL CAL (ref 32–36)
MCV RBC AUTO: 88.5 FL  CALC (ref 80–100)
MONOCYTES NFR BLD AUTO: 10 % (ref 2–13)
NEUTROPHILS # BLD AUTO: 3.06 THOU/UL (ref 1.82–7.42)
NEUTROPHILS NFR BLD AUTO: 68 % (ref 42–76)
PLATELET # BLD AUTO: 165 THOU/UL (ref 130–400)
POTASSIUM SERPL-SCNC: 5.4 MMOL/L (ref 3.5–5.1)
RBC # BLD AUTO: 3.12 MILL/UL (ref 4.7–6.1)
SODIUM SERPL-SCNC: 136 MMOL/L (ref 137–146)

## 2021-03-21 NOTE — NUR
PT WAS FOUND RESTING IN BEDSIDE CHAIR;PT REPORTS HEADACHE PAIN OF 7/10;PT
MEDICATED WITH TYLENOL 650MG;TELE IN PLACE;2L O2 VIA NC IS IN PLACE AS
WELL;#20G IV IN RAC IS SL, PATENT AND FREE OF COMPLICATIONS;SAFETY PRECAUTIONS
IN PLACE;CALL LIGHT WITHIN REACH;BED IN LOWEST POSITION;WILL CONTINUE TO
MONITOR.

## 2021-03-21 NOTE — NUR
PT RECEIVED FROM ED TO ROOM 269. ARRIVES VIA STRETCHER ACCOMPANIED BY SHANIA HARDY.  PT AMBULATORY TO BED. GAIT UNSTEADY. PT DENIES PAIN AT THIS TIME.
ORIENTED TO UNIT, ROOM, CALL GREENFIELD, LIGHTS, TV. ICE WATER PROVIDED. CALL BELL
WITHIN REACH. AGREES TO CALL PRN.

## 2021-03-21 NOTE — NUR
PT WAS FOUND RESTING IN BED EATING LUNCH;PT HAS NO REPORTS OF PAIN AT THIS
TIME;O2 VIA NC @2L IS IN PLACE;TELE IS IN PLACE;#20G IV IN RAC IS SL, PATENT
AND FREE OF COMPLICATIONS;SAFETY PRECAUTIONS IN PLACE;CALL LIGHT WITHIN
REACH;BED IN LOWEST POSITION;WILL CONTINUE TO MONITOR.

## 2021-03-21 NOTE — NUR
PT WAS FOUND RESTING IN BED;PT IS A&O X3;VS AND ASSESSMENT WERE COMPLETED;PT
HAS NO REPORTS OF PAIN AT THIS TIME;HEART SOUNDS ARE REGULAR IN RATE AND
RHYTHM;LUNG SOUNDS ARE CLEAR IN ALL JUNIOR;RESPIRATIONS ARE EVEN AND UNLABORED
ON 2L O2 VIA NC;2+ EDEMA NOTED IN LOWER EXTREMETIES BILATERALLY;SMALL ABRASION
TO LEFT SHIN AND A SMALL ROUND BLISTER ON THE RIGHT SHIN NOTED;PT MIDDLE
TOENAIL ON RIGHT FOOT APPEARS TO BE TORN AND SOME DRIED BLOOD PRESENT;PT
STATED THAT IT HAPPENED SOMETIME LAST WEEK;PT SKIN IS SCALY AND REDDENED ON
THE LOWER EXTREMETIES AS WELL;PEDAL PULSES WERE WEAK BILATERALLY;TELE IS IN
PLACE;#20G IV IN RAC IS SL, PATENT AND FREE OF COMPLICATIONS;SAFETY
PRECAUTIONS IN PLACE;CALL LIGHT WITHIN REACH;BED IN LOWEST POSITION;WILL
CONTINUE TO MONITOR.

## 2021-03-21 NOTE — NUR
PHYSICAL ASSESMENT COMPLETE. PT CURRENTLY C/O OF GENERAL WEAKNESS AND
DISCOMFORT.  SCHEDULED MEDICATIONS AND PRN MEDICATION ADMINISTERED, SEE E-MAR.
PT DENIES ANY NEEDS AT THIS TIME.  PLAN OF CARE REVIEWED, PT DENIES QUESTIONS,
VERBALIZES UNDERSTANDING. ITEMS WITHIN REACH, BED LOCKED IN LOW POSITION W/
BEDRAILS UP X2. CALL BELL WITHIN REACH, AGREES TO CALL PRN.

## 2021-03-22 VITALS — DIASTOLIC BLOOD PRESSURE: 74 MMHG | SYSTOLIC BLOOD PRESSURE: 148 MMHG

## 2021-03-22 VITALS — SYSTOLIC BLOOD PRESSURE: 133 MMHG | DIASTOLIC BLOOD PRESSURE: 71 MMHG

## 2021-03-22 VITALS — DIASTOLIC BLOOD PRESSURE: 85 MMHG | SYSTOLIC BLOOD PRESSURE: 141 MMHG

## 2021-03-22 VITALS — DIASTOLIC BLOOD PRESSURE: 66 MMHG | SYSTOLIC BLOOD PRESSURE: 124 MMHG

## 2021-03-22 VITALS — DIASTOLIC BLOOD PRESSURE: 63 MMHG | SYSTOLIC BLOOD PRESSURE: 128 MMHG

## 2021-03-22 VITALS — SYSTOLIC BLOOD PRESSURE: 158 MMHG | DIASTOLIC BLOOD PRESSURE: 81 MMHG

## 2021-03-22 LAB
ALBUMIN SERPL-MCNC: 3.1 G/DL (ref 3.2–5)
ALP SERPL-CCNC: 172 U/L (ref 38–126)
ANION GAP SERPL CALCULATED.3IONS-SCNC: 11 MMOL/L
AST SERPL-CCNC: 38 U/L (ref 19–48)
BUN SERPL-MCNC: 47 MG/DL (ref 8–23)
BUN/CREAT SERPL: 39
CHLORIDE SERPL-SCNC: 102 MMOL/L (ref 95–108)
CO2 SERPL-SCNC: 27 MMOL/L (ref 22–30)
CREAT SERPL-MCNC: 1.2 MG/DL (ref 0.7–1.3)
ERYTHROCYTE [DISTWIDTH] IN BLOOD BY AUTOMATED COUNT: 17.1 % (ref 11.5–15.5)
HCT VFR BLD AUTO: 30.3 % (ref 39–50)
HGB BLD-MCNC: 9 G/DL (ref 14–18)
MCH RBC QN AUTO: 26.4 PG  CALC (ref 26–32)
MCHC RBC AUTO-ENTMCNC: 29.7 G/DL CAL (ref 32–36)
MCV RBC AUTO: 88.9 FL  CALC (ref 80–100)
PLATELET # BLD AUTO: 181 THOU/UL (ref 130–400)
POTASSIUM SERPL-SCNC: 4.8 MMOL/L (ref 3.5–5.1)
PROT SERPL-MCNC: 6.2 G/DL (ref 6.3–8.2)
RBC # BLD AUTO: 3.41 MILL/UL (ref 4.7–6.1)
SODIUM SERPL-SCNC: 135 MMOL/L (ref 137–146)

## 2021-03-22 NOTE — NUR
PT WAS FOUND RESTING IN BEDSIDE CHAIR EATING BREAKFAST;PT IS A&O X3;VS AND
ASSESSMENT WERE COMPLETED;PT HAS NO REPORTS OF PAIN AT THIS TIME;HEART SOUNDS
ARE REGULAR IN RATE AND RHYTHM;LUNG SOUNDS ARE CLEAR IN ALL
JUNIOR;RESPIRATIONS ARE EVEN AND UNLABORED ON ROOM AIR;O2 @ 2L VIA NC IS IN
PLACE;TELE IS IN PLACE;#20G IV IN RAC IS SL, PATENT AND FREE OF
COMPLICATIONS;SAFETY PRECAUTIONS IN PLACE;CALL LIGHT WITHIN REACH;BED IN
LOWEST POSITION;WILL CONTINUE TO MONITOR.

## 2021-03-22 NOTE — NUR
PT WAS FOUND RESTING IN BEDSIDE CHAIR;PT HAS NO REPORTS OF PAIN AT THIS
TIME;TELE IS IN PLACE;#20G IN RAC IS SL, PATENT AND FREE OF
COMPLICATIONS;2L O2 VIA NC IS IN PLACE;SAFETY PRECAUTIONS IN PLACE;CALL LIGHT
WITHIN REACH;BED IN LOWEST POSITION;WILL CONTINUE TO MONITOR.

## 2021-03-22 NOTE — NUR
PT RESTING COMFORTABLY IN BED WITH HIS EYES CLOSED. BREATHING IS EVEN AND
UNLABORED. SHIFT ASSESSMENT COMPLETED. NO CONCERNS VOICED AT THIS TIME. DENIES
PAIN. O2 REMIANS ON @ 2L VIA NC. BED IN LOWEST POSITION. CALL LIGHT WITHIN
REACH. WILL MONITOR.

## 2021-03-22 NOTE — NUR
PT WAS FOUND RESTING IN BED;PT HAS NO REPORTS OF PAIN AT THIS TIME;TELE IS IN
PLACE;2L O2 VIA NC IS IN PLACE;SAFETY PRECAUTIONS IN PLACE FOR PT;CALL LIGHT
WITHIN REACH;BED IN LOWEST POSITION;WILL CONTINUE TO MONITOR.

## 2021-03-22 NOTE — NUR
PT WAS FOUND WITH BLOOD ON THE FLOOR, LINENS AND SOCKS;PT STATED THAT HE DID
NOT KNOW WHAT HAPPENED OR HOW HE INJURED HIS TOE;PT HAD AMBULATED FROM THE
BEDSIDE CHAIR AND THOUGHT HE MIGHT HAVE HIT IT ON SOMETHING, BUT WASN'T
SURE;MARY WAS NOTIFIED AND HE CAME AND INSPECTED THE SECOND DIGIT ON THE
RIGHT FOOT;HE ADVISED TO CLEAN AND DRESS THE TOE;CLEANED WITH NS, TELFA PLACED
ALONG WITH 4X4 GAUZE AND WRAPPED WITH NARROW CURLEX;PT TOLERATED WELL.

## 2021-03-23 VITALS — DIASTOLIC BLOOD PRESSURE: 56 MMHG | SYSTOLIC BLOOD PRESSURE: 132 MMHG

## 2021-03-23 VITALS — SYSTOLIC BLOOD PRESSURE: 161 MMHG | DIASTOLIC BLOOD PRESSURE: 71 MMHG

## 2021-03-23 VITALS — SYSTOLIC BLOOD PRESSURE: 138 MMHG | DIASTOLIC BLOOD PRESSURE: 68 MMHG

## 2021-03-23 VITALS — DIASTOLIC BLOOD PRESSURE: 78 MMHG | SYSTOLIC BLOOD PRESSURE: 132 MMHG

## 2021-03-23 VITALS — DIASTOLIC BLOOD PRESSURE: 73 MMHG | SYSTOLIC BLOOD PRESSURE: 144 MMHG

## 2021-03-23 VITALS — DIASTOLIC BLOOD PRESSURE: 74 MMHG | SYSTOLIC BLOOD PRESSURE: 138 MMHG

## 2021-03-23 LAB
ANION GAP SERPL CALCULATED.3IONS-SCNC: 11 MMOL/L
BUN SERPL-MCNC: 52 MG/DL (ref 8–23)
BUN/CREAT SERPL: 40
CHLORIDE SERPL-SCNC: 99 MMOL/L (ref 95–108)
CO2 SERPL-SCNC: 26 MMOL/L (ref 22–30)
CREAT SERPL-MCNC: 1.3 MG/DL (ref 0.7–1.3)
ERYTHROCYTE [DISTWIDTH] IN BLOOD BY AUTOMATED COUNT: 17.2 % (ref 11.5–15.5)
HCT VFR BLD AUTO: 30.4 % (ref 39–50)
HGB BLD-MCNC: 9.2 G/DL (ref 14–18)
MCH RBC QN AUTO: 26.2 PG  CALC (ref 26–32)
MCHC RBC AUTO-ENTMCNC: 30.3 G/DL CAL (ref 32–36)
MCV RBC AUTO: 86.6 FL  CALC (ref 80–100)
PLATELET # BLD AUTO: 162 THOU/UL (ref 130–400)
POTASSIUM SERPL-SCNC: 4.8 MMOL/L (ref 3.5–5.1)
RBC # BLD AUTO: 3.51 MILL/UL (ref 4.7–6.1)
SODIUM SERPL-SCNC: 131 MMOL/L (ref 137–146)

## 2021-03-23 NOTE — NUR
SIX MINUTE WALK TEST DONE AT THIS TIME. SPO2 AT REST WITH O2 OFF FOR 30
MINUTES WAS 92%. AFTER WALKING PATIENT FOR 5 MINUTES WITHOUT O2 ON SPO2 WAS
87% WHEN REPLACING O2 AND WALKING SPOE WAS 89% AND AT FINAL REST SPOE WAS 93%.
ALL INFORMATION PROVIDER TO PROVIDER AT THIS TIME.

## 2021-03-23 NOTE — NUR
PATIENT RESTING IN BED AT THIS TIME WITH HOB SLIGHTLY ELEVATED AND O2 VIA
NASAL CANNULA IN PLACE AT 2LPM. O2 SAT IS 97%. PATIENT IS EASY TO AWAKEN-ONLY
ANSWERS QUESTIONS WITH YES OR NO ANSWERS. ACCU-CHECK -COVERED WITH
HUMALOG 2UNITS AS PER SLIDING SCALE COVERAGE PROTOCOL. REFUSING HS SNACK AT
THIS TIME. TELE MONITOR IN PLACE. SALINE LOCK TO LAC INTACT WITH GOOD BLOOD
RETURN-BUMEX GIVEN AS ORDERED. TUBAGRIP STOCKING IN PLACE-CONT TO HAVE BLE
EDEMA. SAFETY PRECAUTIONS REINFORCED. CALL LLIGHT IN REACH. WILL CONT TO
MONITOR.

## 2021-03-23 NOTE — NUR
PT RESTING QUIETLY AT THIS TIME WITH HIS EYES CLOSED. NO COMPLAINTS VOICED AT
THIS TIME. DENIES PAIN. BED IN THE LOWEST POSITION, CALL LIGHT WITHIN REACH.

## 2021-03-23 NOTE — NUR
PATIENT IN BED AT THIS TIME. PATIENT DENEIS ANY PAIN AND STATES HIS PAIN IS A
"0" OUT OF THE PAIN SCALE OF 0-10. PATIENT BILATERAL LOWER LEG EDEMA IS 2+ .
RN ASSESSMENT DONE AT THIS TIME SEE INTERVENTIONS. LUNG SOUNDS REMAIN CLEAR.
TELE MONITOR IN PLACE AND BEING MONITORED BY ED. SIDERAILS ARE UP CALL LIGHT
AND PERSONAL ITEMS WITHIN REACH. DRESSING ON SECOND TOE RIGHT FOOT IN PLACE.

## 2021-03-23 NOTE — NUR
DR. DRIVER IN TO SEE PATIENT AT THIS TIME. DR. DRIVER ORDERED PATIENT TO
HAVE TUBIE STOCKINGS ON FOR COMPRESSION AND ALSO ORDERED PATIENTS RASH TO BE
TREATED WITH A A 50/50 MIX OF NYSTATIN POWDER AND ZINC OXIDE. STOCKING PLACED
ON PATIENT BY THIS NURSE AND ORDER FOR ZINC OXIDE SENT TO PHARMACY.

## 2021-03-23 NOTE — NUR
PT C/O URINARY RETENTION, "I HAVE TRIED TO GO PEE AND NOTHING IS COMING OUT"
ASSESSED BLADDER AND NOTED THAT BLADDER WAS SLIGHTLY DISTENTED AND PT REPORTS
PAIN WITH PALPATION. BLADDER SCANNER OBTAINED FROM ER, BLADDER SCANNED PT AND
NOTED 192ML MAX RETENTION AT THIS TIME. ADVISED PT TO DRINK MORE FLUIDS AND
CONTINUE TO TRY. WILL MONITOR.

## 2021-03-23 NOTE — NUR
PATIENT RESTING IN BED AT THIS TIME WITH EYES CLOSED AND O2 VIA NASAL CANNULA
INP LACE AT 2LPM. RESPS ARE EVEN AND UNLABORED. TELE MONITOR IN PLACE. CALL
LIGHT IN REACH. WILL CONT TO MONITOR.

## 2021-03-23 NOTE — NUR
PATIENT SITTING UP IN CHAIR AT THIS TIME. 02 ON AT 2 LITERS TELE-MONITOR IN
PLACE AND BEING MONITORED BY ED. PATIENT DENEIS ANY PAIN AND OR NEEDS AT THIS
TIME. CALL LIGHT IS WITHIN REACH AS WELL AS PERSONAL ITEMS.

## 2021-03-23 NOTE — NUR
PT RESTING QUIETLY WITH EYES CLOSED. PT WAS ABLE TO VOID ABOUT 200ML OF URINE
INTO URINAL. NO COMPLAINTS VOICED AT THIS TIME. DENIES PAIN. WILL MONITOR

## 2021-03-24 VITALS — SYSTOLIC BLOOD PRESSURE: 141 MMHG | DIASTOLIC BLOOD PRESSURE: 61 MMHG

## 2021-03-24 VITALS — SYSTOLIC BLOOD PRESSURE: 135 MMHG | DIASTOLIC BLOOD PRESSURE: 65 MMHG

## 2021-03-24 VITALS — DIASTOLIC BLOOD PRESSURE: 71 MMHG | SYSTOLIC BLOOD PRESSURE: 149 MMHG

## 2021-03-24 VITALS — SYSTOLIC BLOOD PRESSURE: 158 MMHG | DIASTOLIC BLOOD PRESSURE: 70 MMHG

## 2021-03-24 LAB
ANION GAP SERPL CALCULATED.3IONS-SCNC: 12 MMOL/L
BUN SERPL-MCNC: 54 MG/DL (ref 8–23)
BUN/CREAT SERPL: 40
CHLORIDE SERPL-SCNC: 100 MMOL/L (ref 95–108)
CO2 SERPL-SCNC: 26 MMOL/L (ref 22–30)
CREAT SERPL-MCNC: 1.3 MG/DL (ref 0.7–1.3)
ERYTHROCYTE [DISTWIDTH] IN BLOOD BY AUTOMATED COUNT: 17.1 % (ref 11.5–15.5)
HCT VFR BLD AUTO: 28.2 % (ref 39–50)
HGB BLD-MCNC: 8.6 G/DL (ref 14–18)
MCH RBC QN AUTO: 26.1 PG  CALC (ref 26–32)
MCHC RBC AUTO-ENTMCNC: 30.5 G/DL CAL (ref 32–36)
MCV RBC AUTO: 85.5 FL  CALC (ref 80–100)
PLATELET # BLD AUTO: 139 THOU/UL (ref 130–400)
POTASSIUM SERPL-SCNC: 4.5 MMOL/L (ref 3.5–5.1)
RBC # BLD AUTO: 3.3 MILL/UL (ref 4.7–6.1)
SODIUM SERPL-SCNC: 133 MMOL/L (ref 137–146)

## 2021-03-24 NOTE — NUR
PT RESTING IN THE BED AXOX3. DENIES PAIN AT THIS TIME. O2 2L NC IN PLACE . OUT
OF THE BED TO THE BEDSIDE CHAIR WITH ASST, NO SOB NOTED AT THIS TIME. CALL
LIGHT IN REACH, ALL SAFTY MEASURES IN PLACE. WILL CONTINUE TO MONITOR THE
PATIENT.

## 2021-03-24 NOTE — NUR
NOTED PT IS VOIDING IN AMOUTS  AND 200CC EACH TIME. NOTED THE SCROTUM OF
THE PT SITTING UP IN THE BEDSIDE CHAIR APPEARS TO BE SWOLLEN. REPORTED TO
PROVIDER.

## 2021-03-24 NOTE — NUR
H.L. AND TELEL REMOVED. WENT OVER DISCHARGE ORDERS AND MEDS WITH THE PATIENT.
PT GIVEN TEL NUMBER OF HH SERVICE. O2 PLACED ON THE PT. DISGHARGE ORDERS GIVEN
UNDERSTOOD AND SIGNED BY THE PT . PT LEFT VIA WHEELCHAIR TO GO TO OWN
HOME.

## 2021-03-24 NOTE — NUR
PATIENT RESTING IN BED WITH EYES CLOSED AND O2 VIA NASAL CANNULA IN PLACE AT
2LPM. RESPS ARE EVEN AND UNLABORED. TELE MONITOR IN PLACE. CALL LIGHT IN
REACH. WILL CONT TO MONITOR.

## 2021-03-24 NOTE — NUR
PT HAS A DISCHARGE ORDER TO GO HOME WITH PT AND HOME HEALTH. O2 AT THE
BEDSIDE, PT EDUCATED ON USE OF PORTABLE HOME O2. FLORESITA REMOVED TIP IN TACT.

## 2021-03-24 NOTE — NUR
PATIENT RESTING IN BED AT THIS TIME WITH O2 VIA NASAL CANNULA IN PLACE. RESPS
ARE EVEN AND UNLABORED. URINAL EMPTIED FOR 300CC OF LINDA URINE. TELE MONITOR
IN PLACE. CALL LIGHT IN REACH. WILL CONT TO MONITOR

## 2021-03-30 ENCOUNTER — HOSPITAL ENCOUNTER (INPATIENT)
Dept: HOSPITAL 82 - ED | Age: 61
LOS: 8 days | Discharge: HOME HEALTH | DRG: 291 | End: 2021-04-07
Attending: INTERNAL MEDICINE | Admitting: INTERNAL MEDICINE
Payer: MEDICARE

## 2021-03-30 VITALS — DIASTOLIC BLOOD PRESSURE: 75 MMHG | SYSTOLIC BLOOD PRESSURE: 147 MMHG

## 2021-03-30 VITALS — DIASTOLIC BLOOD PRESSURE: 78 MMHG | SYSTOLIC BLOOD PRESSURE: 147 MMHG

## 2021-03-30 VITALS — SYSTOLIC BLOOD PRESSURE: 171 MMHG | DIASTOLIC BLOOD PRESSURE: 83 MMHG

## 2021-03-30 VITALS — WEIGHT: 260.15 LBS | BODY MASS INDEX: 36.42 KG/M2 | HEIGHT: 71 IN

## 2021-03-30 VITALS — DIASTOLIC BLOOD PRESSURE: 75 MMHG | SYSTOLIC BLOOD PRESSURE: 146 MMHG

## 2021-03-30 VITALS — SYSTOLIC BLOOD PRESSURE: 132 MMHG | DIASTOLIC BLOOD PRESSURE: 73 MMHG

## 2021-03-30 VITALS — DIASTOLIC BLOOD PRESSURE: 78 MMHG | SYSTOLIC BLOOD PRESSURE: 152 MMHG

## 2021-03-30 DIAGNOSIS — Z79.4: ICD-10-CM

## 2021-03-30 DIAGNOSIS — Z92.3: ICD-10-CM

## 2021-03-30 DIAGNOSIS — I13.0: Primary | ICD-10-CM

## 2021-03-30 DIAGNOSIS — I50.23: ICD-10-CM

## 2021-03-30 DIAGNOSIS — Z86.16: ICD-10-CM

## 2021-03-30 DIAGNOSIS — E11.43: ICD-10-CM

## 2021-03-30 DIAGNOSIS — Z86.73: ICD-10-CM

## 2021-03-30 DIAGNOSIS — T82.857A: ICD-10-CM

## 2021-03-30 DIAGNOSIS — B37.2: ICD-10-CM

## 2021-03-30 DIAGNOSIS — Z95.1: ICD-10-CM

## 2021-03-30 DIAGNOSIS — N17.9: ICD-10-CM

## 2021-03-30 DIAGNOSIS — E78.5: ICD-10-CM

## 2021-03-30 DIAGNOSIS — I25.118: ICD-10-CM

## 2021-03-30 DIAGNOSIS — K21.9: ICD-10-CM

## 2021-03-30 DIAGNOSIS — E11.22: ICD-10-CM

## 2021-03-30 DIAGNOSIS — D63.1: ICD-10-CM

## 2021-03-30 DIAGNOSIS — E11.65: ICD-10-CM

## 2021-03-30 DIAGNOSIS — Z85.819: ICD-10-CM

## 2021-03-30 DIAGNOSIS — I08.3: ICD-10-CM

## 2021-03-30 DIAGNOSIS — Z95.5: ICD-10-CM
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
## 2021-03-30 DIAGNOSIS — N18.30: ICD-10-CM

## 2021-03-30 DIAGNOSIS — Z95.3: ICD-10-CM

## 2021-03-30 DIAGNOSIS — E87.1: ICD-10-CM

## 2021-03-30 DIAGNOSIS — K31.84: ICD-10-CM

## 2021-03-30 DIAGNOSIS — Z87.891: ICD-10-CM

## 2021-03-30 DIAGNOSIS — N40.0: ICD-10-CM

## 2021-03-30 DIAGNOSIS — Z92.21: ICD-10-CM

## 2021-03-30 LAB
ALBUMIN SERPL-MCNC: 3.1 G/DL (ref 3.2–5)
ALP SERPL-CCNC: 169 U/L (ref 38–126)
AMORPH SED URNS QL MICRO: (no result) HPF
ANION GAP SERPL CALCULATED.3IONS-SCNC: 12 MMOL/L
APTT PPP: 23.8 SECONDS (ref 20–32.5)
AST SERPL-CCNC: 25 U/L (ref 19–48)
BASOPHILS NFR BLD AUTO: 1 % (ref 0–3)
BILIRUB UR QL STRIP.AUTO: NEGATIVE
BUN SERPL-MCNC: 66 MG/DL (ref 8–23)
BUN/CREAT SERPL: 48
CHLORIDE SERPL-SCNC: 102 MMOL/L (ref 95–108)
CO2 SERPL-SCNC: 24 MMOL/L (ref 22–30)
COLOR UR AUTO: YELLOW
CREAT SERPL-MCNC: 1.4 MG/DL (ref 0.7–1.3)
EOSINOPHIL NFR BLD AUTO: 3 % (ref 0–8)
ERYTHROCYTE [DISTWIDTH] IN BLOOD BY AUTOMATED COUNT: 16.8 % (ref 11.5–15.5)
GLUCOSE UR STRIP.AUTO-MCNC: NEGATIVE MG/DL
HCT VFR BLD AUTO: 29.1 % (ref 39–50)
HGB BLD-MCNC: 8.8 G/DL (ref 14–18)
HGB UR QL STRIP.AUTO: (no result)
IMM GRANULOCYTES NFR BLD: 0.6 % (ref 0–5)
INR PPP: 1.1 RATIO (ref 0.7–1.3)
KETONES UR STRIP.AUTO-MCNC: NEGATIVE MG/DL
LEUKOCYTE ESTERASE UR QL STRIP.AUTO: NEGATIVE
LYMPHOCYTES NFR BLD: 21 % (ref 15–41)
MCH RBC QN AUTO: 25.9 PG  CALC (ref 26–32)
MCHC RBC AUTO-ENTMCNC: 30.2 G/DL CAL (ref 32–36)
MCV RBC AUTO: 85.6 FL  CALC (ref 80–100)
MONOCYTES NFR BLD AUTO: 11 % (ref 2–13)
MYOGLOBIN SERPL-MCNC: 192 NG/ML (ref 0–121)
NEUTROPHILS # BLD AUTO: 3.36 THOU/UL (ref 1.82–7.42)
NEUTROPHILS NFR BLD AUTO: 63 % (ref 42–76)
NITRITE UR QL STRIP.AUTO: NEGATIVE
PH UR STRIP.AUTO: 5 [PH] (ref 4.5–8)
PLATELET # BLD AUTO: 205 THOU/UL (ref 130–400)
POTASSIUM SERPL-SCNC: 4.7 MMOL/L (ref 3.5–5.1)
PROT SERPL-MCNC: 6.1 G/DL (ref 6.3–8.2)
PROT UR QL STRIP.AUTO: 100 MG/DL
PROTHROMBIN TIME: 11 SECONDS (ref 9–12.5)
RBC # BLD AUTO: 3.4 MILL/UL (ref 4.7–6.1)
RBC #/AREA URNS HPF: (no result) RBC/HPF (ref 0–5)
SODIUM SERPL-SCNC: 133 MMOL/L (ref 137–146)
SP GR UR STRIP.AUTO: 1.02
SQUAMOUS URNS QL MICRO: (no result) EPI/HPF
UROBILINOGEN UR QL STRIP.AUTO: 0.2 E.U./DL
WBC #/AREA URNS HPF: (no result) WBC/HPF (ref 0–5)

## 2021-03-30 PROCEDURE — G0378 HOSPITAL OBSERVATION PER HR: HCPCS

## 2021-03-30 PROCEDURE — P9016 RBC LEUKOCYTES REDUCED: HCPCS

## 2021-03-30 NOTE — NUR
PATIENT RESTING IN BED AT THIS TIME WITH O2 VIA NASAL CANNULA IN PLACE.
ACCU-CHECK -MEDICATED WITH HUMALOG 3UNITS SQ PER SLIDING SCALE COVERAGE
SCALE. HS SNACK PROVIDED. MEDICATED FOR GENERALIZED PAIN WITH LORTAB 5/325MG
PO FOR 6/10 P AIN SCALE. C/O SEVERE ITCHING-SPOKE WITH DR. GARDINER AND NEW
ORDER FOR BENEDRYL OBTAINED AND SENT TO [facility]-WILL MEDICATED WITH
PROFILED ON EMAR. SAFETY PRECAUTIONS REINFORCED. CALL LIGHT IN REACH. WILL
CONT TO MONITOR.

## 2021-03-30 NOTE — NUR
PT RESTING IN THE BED, AXOX3. DENIES PAIN AT THIS TIME. REPOSITIONED FOR
COMFORT, SIDE RAILS UP CALL LIGHT IN REACH BED LOCKED IN LOW POSITION, ALL
SAFTY MEASURES IN PLACE. WILL CONTINUE TO MONIOTR THE PATIENT.

## 2021-03-30 NOTE — NUR
PATIENT ADMITTED FROM ER VIA STRETCHER WITH ER STAFF IN ATTENDANCE. PATIENT
WITH O2 VIA NASAL CANNULA IN PLACE AT 2LPM. PATIENT MIN ASSIST FROM STRECHER
TO BED. PATIENT WITH GERALIZED EDEMA, SOB WITH EXHERSION. AWAKE ALERT AMD
ORIENTEDX3. PATIENT CONT TO C/O DULL CHEST PAIN ANDMEDICATED WITH LORTAB
5/325MG PO FOR 6/10 PAIN SCALE. TELE MONITOR IN PLACE-SR-74 IVCD. SALINE LOCK
TO RIGHT  UPPER ARM INTACT-FLUSHES FREELY WITH GOOD BLOOD RETURN. LUNGS ARE
CLEAR BUT DIMINISHED IN RIGHT BASES. BOTH LEGS ARE RED, SCALEY AND
SWOLLEN-ELEVATED ON PILLOWS. PATIENT WITH GENERALIZED EDEMA AUBRIE IN TORSO-C/O
GENERALIZED ITCHING. STATES THAT HE IS ONLY URINATING IN SMALL AMTS EVEN WHEN
TAKING HIS DIURETICS. BLADDER SCAN WAS DONE-235CC. OREINTED TO ROOM AND
SURROUNDINGS. INSTRUCTED ON USE OF NURSE CALL LIGHT SYSTEM, TV REMOTE AND
PHONE. PATIENT ALSO WITH POOR VISION-STATES THAT HE HAS PANFILO CATARACTS. SAFETY
PRECAUTIONS REINFORCED. CALL LIGHT IN REACH. WILL CONT TO MONITOR.

## 2021-03-30 NOTE — NUR
PATIENT CONT TO BE ITCHY-RESTING IN BED WITH O2 VIA NASAL CANNULA IN PLACE AT
2LPM. TELE MONITOR IN PLACE. VOIDED 125CC OF YELLOW URINE IN URINAL. CALL
LIGHT IN REACH. WILL CONT TO MONITOR.

## 2021-03-30 NOTE — NUR
PATIENT RESTING IN BED AT THIS TIME-AWAKE ALERT AND ORIENTEDX3 WITH O2 VIA
NASAL CANNULA IN PLACE. O2 SAT %. TELE MONITOR IN PLACE. SALINE LOCK TO
RIGHT UPPER ARM INTACT AND REMAINS HEALTHY AT THIS TIME. STATES THAT HE HAD
LOOSE STOOL AGAIN TODAY. CONT WITH ANASARCA-BLE ELEVATED ON PILLOWS. SAFETY
PRECAUTIONS REINFORCED. CALLLIGHT IN REACH. WILL CONT TO MONITOR.

## 2021-03-30 NOTE — NUR
PT RESTING COMOFRTABLE IN THE BED, NO DISTRESS NOTED AT THIS TIME. WILL
CONTINUE TO MONIOTR THE PATIENT.

## 2021-03-30 NOTE — NUR
PT RESTING COMOFRTABLE IN THE BED, NO DISTRESS NOTED AT THIS TIME. WILL
CONTINUE TO MONITOR THE PATIENT.

## 2021-03-31 VITALS — DIASTOLIC BLOOD PRESSURE: 62 MMHG | SYSTOLIC BLOOD PRESSURE: 110 MMHG

## 2021-03-31 VITALS — SYSTOLIC BLOOD PRESSURE: 132 MMHG | DIASTOLIC BLOOD PRESSURE: 64 MMHG

## 2021-03-31 VITALS — DIASTOLIC BLOOD PRESSURE: 69 MMHG | SYSTOLIC BLOOD PRESSURE: 145 MMHG

## 2021-03-31 VITALS — DIASTOLIC BLOOD PRESSURE: 64 MMHG | SYSTOLIC BLOOD PRESSURE: 142 MMHG

## 2021-03-31 VITALS — DIASTOLIC BLOOD PRESSURE: 56 MMHG | SYSTOLIC BLOOD PRESSURE: 122 MMHG

## 2021-03-31 VITALS — DIASTOLIC BLOOD PRESSURE: 75 MMHG | SYSTOLIC BLOOD PRESSURE: 149 MMHG

## 2021-03-31 LAB
ANION GAP SERPL CALCULATED.3IONS-SCNC: 10 MMOL/L
BUN SERPL-MCNC: 71 MG/DL (ref 8–23)
BUN/CREAT SERPL: 43
CHLORIDE SERPL-SCNC: 100 MMOL/L (ref 95–108)
CO2 SERPL-SCNC: 27 MMOL/L (ref 22–30)
CREAT SERPL-MCNC: 1.7 MG/DL (ref 0.7–1.3)
ERYTHROCYTE [DISTWIDTH] IN BLOOD BY AUTOMATED COUNT: 16.6 % (ref 11.5–15.5)
HCT VFR BLD AUTO: 29.8 % (ref 39–50)
HGB BLD-MCNC: 8.9 G/DL (ref 14–18)
MAGNESIUM SERPL-MCNC: 2.4 MG/DL (ref 1.6–2.3)
MCH RBC QN AUTO: 25.9 PG  CALC (ref 26–32)
MCHC RBC AUTO-ENTMCNC: 29.9 G/DL CAL (ref 32–36)
MCV RBC AUTO: 86.9 FL  CALC (ref 80–100)
PLATELET # BLD AUTO: 168 THOU/UL (ref 130–400)
POTASSIUM SERPL-SCNC: 4.9 MMOL/L (ref 3.5–5.1)
RBC # BLD AUTO: 3.43 MILL/UL (ref 4.7–6.1)
SODIUM SERPL-SCNC: 132 MMOL/L (ref 137–146)

## 2021-03-31 NOTE — NUR
PATIENT RESTING IN BED AT THIS TIME. PATIENT DENIES ANY PAIN, RN ASSESSMENT
DONE AT THIS TIME. LUNG SOUNDS ARE CLEAR IN UPPER FIELDS AND DISMINISHED IN
LOWER JUNIOR. PATIENT PRESENTS WITH 2+ EDEMA IN BILATERAL LOWER LEGS. ABDOMEN
IS DISTENDED AND FIRM BOWEL SOUNDS PRESENT. PATIENT WAS ASKED ABOUT HIS TUBAL
SOCKS THAT HE HAD ON LAST ADMISSION AND HE STATED WHEN HE WENT HOME LAST WEEK
"I TOOK THEM OFF". PATIENT WAS EDUCATED AGAIN ON THE IMPORTANCE OF THE SOCKS
AND KEEPING THEM ON BUT HE STATED "I DON'T CARE". SIDERAILS ARE UP X 2 CALL
LIGHT AND PERSONAL ITEMS WITHIN REACH.

## 2021-03-31 NOTE — NUR
PATIENT RESTING IN BED AT THIS TIME POSITIONED ON RIGHT SIDE WITH O2 VIA NASAL
CANNULA IN PLACE. EYES ARE CLOSED. RESPS ARE EVEN AND UNLABORED. TELE MONITOR
IN PLACE. CALL LIGHT IN REACH. WILL CONT TO MONITOR.

## 2021-03-31 NOTE — NUR
PATIENT UP IN CHAIR AT THIS TIME EATING LUNCH. PATIENT STATES HE HAS SOME
CRAMPING IN HIS ABDOMEN AND HIS PAIN LEVEL IS A "2". PATIENT STATED HE IS "NOW
STARTING TO MOVE HIS BOWELS". PATIENT CALL LIGHT AND PERSONAL ITEMS ARE WITHIN
REACH

## 2021-03-31 NOTE — NUR
PATIENT CALLED AND REQUSTING MEDICATION FOR ITCHING-BENEDRYL 25MG PO GIVEN FOR
ITCHING. PATIENT REMAINS WITH O2 VIA NASAL CANNULA IN PLACE. VOIDED 200CC OF
LINDA URINE IN URINAL. TELE MONITOR IN PLACE. SALINE LOCK TO RIGHT UPPER ARM
INTACT. MORNING LABS HAVE BEEN DRAWN. CALL LIGHT IN REACH. WILL CONT TO
MONITOR.

## 2021-03-31 NOTE — NUR
PATIENT C/0 OF NAUSEA 4MG IV ZOFRAN GIVEN AT THIS TIME. PATIENT UP IN BATHROOM
AND HAS SMALL SOFT BROWN BOWEL MOVEMENT AT THIS TIME.

## 2021-03-31 NOTE — NUR
PATIENT C/O PAIN ALL OVER AT THIS TIME PATIENT GIVEN 1 TAB PO OF 5/325MG OF
HYDROCODONE AT THIS TIME. PATIENTS SIDERAILS ARE UP CALL LIGHT IS WITHIN
REACH.

## 2021-04-01 VITALS — SYSTOLIC BLOOD PRESSURE: 158 MMHG | DIASTOLIC BLOOD PRESSURE: 66 MMHG

## 2021-04-01 VITALS — SYSTOLIC BLOOD PRESSURE: 180 MMHG | DIASTOLIC BLOOD PRESSURE: 70 MMHG

## 2021-04-01 VITALS — SYSTOLIC BLOOD PRESSURE: 156 MMHG | DIASTOLIC BLOOD PRESSURE: 72 MMHG

## 2021-04-01 VITALS — DIASTOLIC BLOOD PRESSURE: 65 MMHG | SYSTOLIC BLOOD PRESSURE: 153 MMHG

## 2021-04-01 VITALS — DIASTOLIC BLOOD PRESSURE: 75 MMHG | SYSTOLIC BLOOD PRESSURE: 149 MMHG

## 2021-04-01 VITALS — SYSTOLIC BLOOD PRESSURE: 153 MMHG | DIASTOLIC BLOOD PRESSURE: 62 MMHG

## 2021-04-01 VITALS — DIASTOLIC BLOOD PRESSURE: 58 MMHG | SYSTOLIC BLOOD PRESSURE: 117 MMHG

## 2021-04-01 VITALS — SYSTOLIC BLOOD PRESSURE: 151 MMHG | DIASTOLIC BLOOD PRESSURE: 73 MMHG

## 2021-04-01 VITALS — DIASTOLIC BLOOD PRESSURE: 69 MMHG | SYSTOLIC BLOOD PRESSURE: 140 MMHG

## 2021-04-01 VITALS — SYSTOLIC BLOOD PRESSURE: 170 MMHG | DIASTOLIC BLOOD PRESSURE: 70 MMHG

## 2021-04-01 VITALS — DIASTOLIC BLOOD PRESSURE: 60 MMHG | SYSTOLIC BLOOD PRESSURE: 123 MMHG

## 2021-04-01 VITALS — SYSTOLIC BLOOD PRESSURE: 155 MMHG | DIASTOLIC BLOOD PRESSURE: 82 MMHG

## 2021-04-01 VITALS — SYSTOLIC BLOOD PRESSURE: 147 MMHG | DIASTOLIC BLOOD PRESSURE: 73 MMHG

## 2021-04-01 VITALS — DIASTOLIC BLOOD PRESSURE: 77 MMHG | SYSTOLIC BLOOD PRESSURE: 164 MMHG

## 2021-04-01 VITALS — SYSTOLIC BLOOD PRESSURE: 173 MMHG | DIASTOLIC BLOOD PRESSURE: 77 MMHG

## 2021-04-01 VITALS — SYSTOLIC BLOOD PRESSURE: 150 MMHG | DIASTOLIC BLOOD PRESSURE: 76 MMHG

## 2021-04-01 VITALS — SYSTOLIC BLOOD PRESSURE: 137 MMHG | DIASTOLIC BLOOD PRESSURE: 61 MMHG

## 2021-04-01 VITALS — DIASTOLIC BLOOD PRESSURE: 64 MMHG | SYSTOLIC BLOOD PRESSURE: 140 MMHG

## 2021-04-01 VITALS — SYSTOLIC BLOOD PRESSURE: 130 MMHG | DIASTOLIC BLOOD PRESSURE: 64 MMHG

## 2021-04-01 VITALS — SYSTOLIC BLOOD PRESSURE: 150 MMHG | DIASTOLIC BLOOD PRESSURE: 74 MMHG

## 2021-04-01 VITALS — SYSTOLIC BLOOD PRESSURE: 115 MMHG | DIASTOLIC BLOOD PRESSURE: 57 MMHG

## 2021-04-01 VITALS — SYSTOLIC BLOOD PRESSURE: 165 MMHG | DIASTOLIC BLOOD PRESSURE: 78 MMHG

## 2021-04-01 VITALS — DIASTOLIC BLOOD PRESSURE: 82 MMHG | SYSTOLIC BLOOD PRESSURE: 157 MMHG

## 2021-04-01 VITALS — DIASTOLIC BLOOD PRESSURE: 55 MMHG | SYSTOLIC BLOOD PRESSURE: 151 MMHG

## 2021-04-01 LAB
ANION GAP SERPL CALCULATED.3IONS-SCNC: 11 MMOL/L
BUN SERPL-MCNC: 71 MG/DL (ref 8–23)
BUN/CREAT SERPL: 42
CHLORIDE SERPL-SCNC: 98 MMOL/L (ref 95–108)
CO2 SERPL-SCNC: 26 MMOL/L (ref 22–30)
CREAT SERPL-MCNC: 1.7 MG/DL (ref 0.7–1.3)
ERYTHROCYTE [DISTWIDTH] IN BLOOD BY AUTOMATED COUNT: 16.5 % (ref 11.5–15.5)
HCT VFR BLD AUTO: 28.6 % (ref 39–50)
HGB BLD-MCNC: 8.3 G/DL (ref 14–18)
MCH RBC QN AUTO: 25.1 PG  CALC (ref 26–32)
MCHC RBC AUTO-ENTMCNC: 29 G/DL CAL (ref 32–36)
MCV RBC AUTO: 86.4 FL  CALC (ref 80–100)
PLATELET # BLD AUTO: 192 THOU/UL (ref 130–400)
POTASSIUM SERPL-SCNC: 4.8 MMOL/L (ref 3.5–5.1)
RBC # BLD AUTO: 3.31 MILL/UL (ref 4.7–6.1)
SODIUM SERPL-SCNC: 130 MMOL/L (ref 137–146)

## 2021-04-01 PROCEDURE — 0T9B70Z DRAINAGE OF BLADDER WITH DRAINAGE DEVICE, VIA NATURAL OR ARTIFICIAL OPENING: ICD-10-PCS | Performed by: INTERNAL MEDICINE

## 2021-04-01 NOTE — NUR
PATIENT IS BEING TRANSFER TO ICU AT THIS TIME REPORT CALLED TO GABI DAO AT
THIS TIME. PATIENT TRANSFERED VIA WHEELCHAIR.

## 2021-04-01 NOTE — NUR
60 CC OF URINE OUTPUT IN FIRST HOUR, STARTED MAINTENCE DOSE AT 0.25 PER
PROTOCOL AND WILL CONTINUE TO MONITER OUTPUT.  B/P 153/62

## 2021-04-01 NOTE — NUR
PT RESTING QUIETLY IN BED, C/O HEADACHE ABOUT AN HOUR AGO. MEDICATED WITH
LORTAB, MEDICATION WAS EFFECTIVE. NO OTHER COMPLAINTS VOICED. WILL MONITOR.

## 2021-04-01 NOTE — NUR
LATE ENTRY FOR 03/31/21 @ 2000
PT IN BED UPON ASSESSMENT. NO C/O PAIN REPORTED. BREATHING EVEN AND UNLABORED.
IV REMAINS TO DEEPAK, REMAINS SALINE LOCKED, FLUSHES EASILY. O2 @ 2L VIA NC.
EDEMA REMAINS TO BLLE. WILL MONITOR

## 2021-04-01 NOTE — NUR
PT RESTING QUIETLY AT THIS TIME. NO COMPLAINTS VOICED. BREATHING EVEN AND
UNLABORED. BED IN LOWEST POSITION, CALL LIGHT WITHIN REACH. WILL MONITOR

## 2021-04-01 NOTE — NUR
PT Note
Patient was sleeping as entered room, attempted wake and patient just looked
at me and closed eyes.  did this about 3-4 times.  Patient was supone and a
sleep as exited room

## 2021-04-01 NOTE — NUR
MEDICATED FOR NAUSEA PER PATIENTS COMPLAINT. INSTRUCTED TO PERFORM SLOW DEEP
BREATHS AS TOLERATED. ISABEL DAO NOTIFIED.

## 2021-04-01 NOTE — NUR
PATIENT RESTING IN BED AT THIS TIME. RN ASSESSMENT DONE AT THIS TIME. PATIENTS
LUNG SOUNDS ARE CLEAR IN UPPER FIELDS AND DIMINISHED IN LOWER FIELDS. PATIENT
STATES "I FEEL BETTER TODAY". PATIENT ENCOURAGED TO GET UP IN CHAIR TODAY AND
PATIENT STATE "I WILL". BILATERAL LOWER LEGS AND TORSO REMAIN SWOLLEN AND 2+
EDEMAN NOTED IN LOWER LEGS. CALL LIGHT AND PERSONAL ITEMS WIHTIN REACH
SIDERAILS UP X 2.

## 2021-04-01 NOTE — NUR
PATIENT REQUESTING BENADRYL AT THIS TIME. HE WAS INFORMED THAT HE WAS ALREADY
GIVEN THE MEDICATION. THE NEXT DOSE IS DUE AT 0230.

## 2021-04-01 NOTE — NUR
PT BROUGHT FROM [facility] PER W/C WITH O2 @ 2 LITRES, WEIGHED ON SCALE .1
KG, PLACED INTO BED AND HOOKED UP TO CARDIAC MONITER.  PLACED #18 LOPEZ CATH
IN WITH NO RESISTANCE AND  CC OF LIGHT YELLOW OUT.  PT ALERT/ORIENTED
WITH 3+ EDEMA TO LEGS AND TORSO,  PT STATES DOES NOT FEEL WELL AT THIS TIME.
MEAL WAS BROUGHT OVER,   EXPLAINED ABOUT THE BUMEX DRIP THAT WAS GOING TO BE
GIVEN AND THAT WE WOULD NEED TO HAVE ANOTHER IV ACCESS IN CASE OF THE NEED FOR
ANOTHER DRIP FOR LOW BLOOD PRESSURE, PT VOICES UNDERSTANDING.

## 2021-04-01 NOTE — NUR
BUMEX HUNG AT 0.5 MG/HR PER PROTOCOL  BLOOD PRESSURE AT THIS TIME /64,
WILL CONTINUE TO MONITER BLOOD PRESSURE.  HAVE BACK UP FLUIDS AND DOBUTAMINE
READY IF BLOOD PRESSURE DROPS BELOW 110

## 2021-04-01 NOTE — NUR
PATIENT SITTING UP IN CHAIR AT THIS TIME. PATIENT STATES "I FEEL LIKE SHIT"
AND I "HURT ALL OVER". PATIENT MEDICATED WITH LORITAB 5/325MG AT THIS TIME FOR
STATED PAIN OF "8" OUT OF 0-10. PATIENT SITTING IN CHAIR AT THIS TIME. CALL
LIGHT AND PERSONAL ITEMS WITHIN REACH.

## 2021-04-01 NOTE — NUR
PT SLEEPING AT THIS TIME,  VITAL SIGNS STABLE, BUMEX INFUSING AND LOPEZ
DRAINING CLEAR YELLOW URINE.

## 2021-04-01 NOTE — NUR
PT HAS REMAINED SLEEPING WITH LOPEZ DRAINING AND BUMEX INFUSING.  B/P HAS
REMAINED STABLE NO NEED FOR DUBUTAMINE AT THIS TIME.  WILL CONTINUE TO MONITER
OUTPUT AND VITAL SIGNS.

## 2021-04-01 NOTE — NUR
FINGERSTICK GLUCOSE 243mg/dl. INULIN COVERAGE ADMINISTERED PER SLIDING SCALE,
SEE E-MAR. DIABETIC HS SNACK PROVIDED. SCHEDULED MEDICATIONS ADMINISTERED, SEE
E-MAR. PRN LORTAB ADMINISTERED FOR C/O GENERALIZED BODY ACHES 8/10. PRN
BENADRLY ADMINISTERED FOR C/O ITCHING AND PRN ZOFRAN ADMINISTERED PER PT'S
REQUEST TO PREVENT N/V WITH HS SNACK. 750ML CLEAR PALE YELLOW URINE EMPTIED
FROM LOPEZ. PT DENIES FURTHER NEEDS AT THIS TIME WHEN ASKED. CALL BELL WITHIN
REACH, AGREES TO CALL PRN.

## 2021-04-02 VITALS — DIASTOLIC BLOOD PRESSURE: 88 MMHG | SYSTOLIC BLOOD PRESSURE: 141 MMHG

## 2021-04-02 VITALS — DIASTOLIC BLOOD PRESSURE: 70 MMHG | SYSTOLIC BLOOD PRESSURE: 156 MMHG

## 2021-04-02 VITALS — DIASTOLIC BLOOD PRESSURE: 75 MMHG | SYSTOLIC BLOOD PRESSURE: 131 MMHG

## 2021-04-02 VITALS — SYSTOLIC BLOOD PRESSURE: 138 MMHG | DIASTOLIC BLOOD PRESSURE: 70 MMHG

## 2021-04-02 VITALS — DIASTOLIC BLOOD PRESSURE: 68 MMHG | SYSTOLIC BLOOD PRESSURE: 151 MMHG

## 2021-04-02 VITALS — DIASTOLIC BLOOD PRESSURE: 60 MMHG | SYSTOLIC BLOOD PRESSURE: 144 MMHG

## 2021-04-02 VITALS — DIASTOLIC BLOOD PRESSURE: 72 MMHG | SYSTOLIC BLOOD PRESSURE: 149 MMHG

## 2021-04-02 VITALS — SYSTOLIC BLOOD PRESSURE: 161 MMHG | DIASTOLIC BLOOD PRESSURE: 71 MMHG

## 2021-04-02 VITALS — SYSTOLIC BLOOD PRESSURE: 149 MMHG | DIASTOLIC BLOOD PRESSURE: 77 MMHG

## 2021-04-02 VITALS — SYSTOLIC BLOOD PRESSURE: 138 MMHG | DIASTOLIC BLOOD PRESSURE: 65 MMHG

## 2021-04-02 VITALS — SYSTOLIC BLOOD PRESSURE: 138 MMHG | DIASTOLIC BLOOD PRESSURE: 69 MMHG

## 2021-04-02 VITALS — DIASTOLIC BLOOD PRESSURE: 60 MMHG | SYSTOLIC BLOOD PRESSURE: 134 MMHG

## 2021-04-02 VITALS — DIASTOLIC BLOOD PRESSURE: 60 MMHG | SYSTOLIC BLOOD PRESSURE: 135 MMHG

## 2021-04-02 VITALS — DIASTOLIC BLOOD PRESSURE: 66 MMHG | SYSTOLIC BLOOD PRESSURE: 153 MMHG

## 2021-04-02 VITALS — DIASTOLIC BLOOD PRESSURE: 73 MMHG | SYSTOLIC BLOOD PRESSURE: 170 MMHG

## 2021-04-02 VITALS — SYSTOLIC BLOOD PRESSURE: 162 MMHG | DIASTOLIC BLOOD PRESSURE: 64 MMHG

## 2021-04-02 VITALS — DIASTOLIC BLOOD PRESSURE: 49 MMHG | SYSTOLIC BLOOD PRESSURE: 116 MMHG

## 2021-04-02 VITALS — DIASTOLIC BLOOD PRESSURE: 69 MMHG | SYSTOLIC BLOOD PRESSURE: 135 MMHG

## 2021-04-02 VITALS — SYSTOLIC BLOOD PRESSURE: 162 MMHG | DIASTOLIC BLOOD PRESSURE: 77 MMHG

## 2021-04-02 VITALS — SYSTOLIC BLOOD PRESSURE: 135 MMHG | DIASTOLIC BLOOD PRESSURE: 58 MMHG

## 2021-04-02 VITALS — DIASTOLIC BLOOD PRESSURE: 67 MMHG | SYSTOLIC BLOOD PRESSURE: 139 MMHG

## 2021-04-02 VITALS — DIASTOLIC BLOOD PRESSURE: 64 MMHG | SYSTOLIC BLOOD PRESSURE: 132 MMHG

## 2021-04-02 VITALS — SYSTOLIC BLOOD PRESSURE: 134 MMHG | DIASTOLIC BLOOD PRESSURE: 64 MMHG

## 2021-04-02 VITALS — SYSTOLIC BLOOD PRESSURE: 156 MMHG | DIASTOLIC BLOOD PRESSURE: 78 MMHG

## 2021-04-02 LAB
ALBUMIN SERPL-MCNC: 2.8 G/DL (ref 3.2–5)
BUN SERPL-MCNC: 71 MG/DL (ref 8–23)
CHLORIDE SERPL-SCNC: 97 MMOL/L (ref 95–108)
CO2 SERPL-SCNC: 30 MMOL/L (ref 22–30)
CREAT SERPL-MCNC: 1.8 MG/DL (ref 0.7–1.3)
POTASSIUM SERPL-SCNC: 4.6 MMOL/L (ref 3.5–5.1)
SODIUM SERPL-SCNC: 131 MMOL/L (ref 137–146)

## 2021-04-02 NOTE — NUR
pt awake in bed; appears withdrawn/flat affect; no apparent distress noted; pt
offers no complaints; assessment completed at this time; pt alert and
oriented; denies pain; no n/v noted; resp even and unlabored; lungs clear;
skin color pale; o2 per nc at 2L; hr reg; weak pedal pulses; anascara noted;
sr/ivcd on monitor; abd soft with bs present; edema noted nikki lower abd as
well; no bm noted per writer; shay to gravity draining clear yellow urine;
cath strap intact; #20 patent to rac with bumex gtt infusing art 2.5mg/ml =
0.25mg/hr; no redness or edema noted at site; scaly dry skin noted to bilat
shins area; pt with complaints of generalized itching; plan of care/ am meds
explained; call light within reach; will continue to monitor

## 2021-04-02 NOTE — NUR
resting in bed; bumex gtt continues at 0.25mg/hr; no redness or edema noted;
sr/ ivcd on monitor; shay to gravity; o2 per nc; call light within reach;
will continue to monitor

## 2021-04-02 NOTE — NUR
PATIENT RESTING WITH EYES CLOSED. RESP EVEN AND UNLABORED. NO S/S OF DITRESS
NOTED. FALL AND SAFTEY PRECAUTIONS IN PLACE.

## 2021-04-02 NOTE — NUR
resting in bed with eyes closed; o2 per nc; shay to gravity; iv intact and
patent; call light within reach

## 2021-04-02 NOTE — NUR
awake in bed conversing on cell phone; no apparent distress noted; sr/ ivcd
on monitor; shay to gravity; o2 per nc; call light within reach; will
continue to monitor

## 2021-04-02 NOTE — NUR
awake in bed; offers no complaints; iv intact and patent; sr on monitor; shay
to gravity; call light within reach;

## 2021-04-02 NOTE — NUR
REPORT GIVEN BY BRANDYN. PATIENT RESTING IN BED WITH EYES CLOSED. ALERT AND
ORIENTED, RESPONSIVE TO VOICE. RESP EVEN AND UNLABORED. C/O OF GENERALIZED
WEAKNESS. FALL AND SAFTEY PRECAUTIONS IN PLACE. IV INFUSING BUMEX 0.25 MG/HR.
LOPEZ DRAINING PALE YELLOW URINE. EDEMA PRESENT HIPS, BLE, ABD, AND GROIN PLUS
2 WITH SKIN TIGHTENSS. PLAN OF CARE DISCUSSED. PATIENT INFORMED TO CALL WITH
ANY QUESTIONS OR COCNERNS.

## 2021-04-02 NOTE — NUR
PATIENT RESTING WITH EYES CLOSED. RESP EVEN AND UNLABORED. NO S/S OF DISTRESS
NOTED. FALL AND SAFTEY PRECAUTIONS IN PLACE.

## 2021-04-02 NOTE — NUR
awake in bed resting on left side; no apparent distress noted; pt offers no
complaints; iv intact and patent; call light within reach; will continue to
monitor

## 2021-04-02 NOTE — NUR
resting in bed with eyes closed; no apparent distress noted; o2 per nc; sr/
ivcd on monitor; call light within reach; will continue to monitor

## 2021-04-03 VITALS — DIASTOLIC BLOOD PRESSURE: 52 MMHG | SYSTOLIC BLOOD PRESSURE: 118 MMHG

## 2021-04-03 VITALS — DIASTOLIC BLOOD PRESSURE: 79 MMHG | SYSTOLIC BLOOD PRESSURE: 169 MMHG

## 2021-04-03 VITALS — DIASTOLIC BLOOD PRESSURE: 67 MMHG | SYSTOLIC BLOOD PRESSURE: 145 MMHG

## 2021-04-03 VITALS — SYSTOLIC BLOOD PRESSURE: 199 MMHG | DIASTOLIC BLOOD PRESSURE: 72 MMHG

## 2021-04-03 VITALS — DIASTOLIC BLOOD PRESSURE: 62 MMHG | SYSTOLIC BLOOD PRESSURE: 137 MMHG

## 2021-04-03 VITALS — SYSTOLIC BLOOD PRESSURE: 170 MMHG | DIASTOLIC BLOOD PRESSURE: 81 MMHG

## 2021-04-03 VITALS — DIASTOLIC BLOOD PRESSURE: 55 MMHG | SYSTOLIC BLOOD PRESSURE: 150 MMHG

## 2021-04-03 VITALS — SYSTOLIC BLOOD PRESSURE: 136 MMHG | DIASTOLIC BLOOD PRESSURE: 59 MMHG

## 2021-04-03 VITALS — SYSTOLIC BLOOD PRESSURE: 121 MMHG | DIASTOLIC BLOOD PRESSURE: 65 MMHG

## 2021-04-03 VITALS — DIASTOLIC BLOOD PRESSURE: 79 MMHG | SYSTOLIC BLOOD PRESSURE: 123 MMHG

## 2021-04-03 VITALS — DIASTOLIC BLOOD PRESSURE: 64 MMHG | SYSTOLIC BLOOD PRESSURE: 150 MMHG

## 2021-04-03 VITALS — SYSTOLIC BLOOD PRESSURE: 139 MMHG | DIASTOLIC BLOOD PRESSURE: 54 MMHG

## 2021-04-03 VITALS — SYSTOLIC BLOOD PRESSURE: 110 MMHG | DIASTOLIC BLOOD PRESSURE: 79 MMHG

## 2021-04-03 VITALS — SYSTOLIC BLOOD PRESSURE: 122 MMHG | DIASTOLIC BLOOD PRESSURE: 61 MMHG

## 2021-04-03 VITALS — DIASTOLIC BLOOD PRESSURE: 62 MMHG | SYSTOLIC BLOOD PRESSURE: 153 MMHG

## 2021-04-03 VITALS — SYSTOLIC BLOOD PRESSURE: 127 MMHG | DIASTOLIC BLOOD PRESSURE: 65 MMHG

## 2021-04-03 VITALS — DIASTOLIC BLOOD PRESSURE: 55 MMHG | SYSTOLIC BLOOD PRESSURE: 117 MMHG

## 2021-04-03 VITALS — DIASTOLIC BLOOD PRESSURE: 60 MMHG | SYSTOLIC BLOOD PRESSURE: 138 MMHG

## 2021-04-03 VITALS — DIASTOLIC BLOOD PRESSURE: 61 MMHG | SYSTOLIC BLOOD PRESSURE: 144 MMHG

## 2021-04-03 VITALS — SYSTOLIC BLOOD PRESSURE: 139 MMHG | DIASTOLIC BLOOD PRESSURE: 59 MMHG

## 2021-04-03 VITALS — SYSTOLIC BLOOD PRESSURE: 134 MMHG | DIASTOLIC BLOOD PRESSURE: 56 MMHG

## 2021-04-03 VITALS — SYSTOLIC BLOOD PRESSURE: 128 MMHG | DIASTOLIC BLOOD PRESSURE: 57 MMHG

## 2021-04-03 VITALS — DIASTOLIC BLOOD PRESSURE: 53 MMHG | SYSTOLIC BLOOD PRESSURE: 133 MMHG

## 2021-04-03 LAB
ALBUMIN SERPL-MCNC: 2.9 G/DL (ref 3.2–5)
ANION GAP SERPL CALCULATED.3IONS-SCNC: 10 MMOL/L
BUN SERPL-MCNC: 71 MG/DL (ref 8–23)
BUN SERPL-MCNC: 72 MG/DL (ref 8–23)
BUN/CREAT SERPL: 39
CHLORIDE SERPL-SCNC: 95 MMOL/L (ref 95–108)
CHLORIDE SERPL-SCNC: 95 MMOL/L (ref 95–108)
CO2 SERPL-SCNC: 30 MMOL/L (ref 22–30)
CO2 SERPL-SCNC: 31 MMOL/L (ref 22–30)
CREAT SERPL-MCNC: 1.8 MG/DL (ref 0.7–1.3)
CREAT SERPL-MCNC: 1.8 MG/DL (ref 0.7–1.3)
ERYTHROCYTE [DISTWIDTH] IN BLOOD BY AUTOMATED COUNT: 16.6 % (ref 11.5–15.5)
HCT VFR BLD AUTO: 27.7 % (ref 39–50)
HGB BLD-MCNC: 8.3 G/DL (ref 14–18)
MCH RBC QN AUTO: 25.5 PG  CALC (ref 26–32)
MCHC RBC AUTO-ENTMCNC: 30 G/DL CAL (ref 32–36)
MCV RBC AUTO: 85 FL  CALC (ref 80–100)
PLATELET # BLD AUTO: 163 THOU/UL (ref 130–400)
POTASSIUM SERPL-SCNC: 4.3 MMOL/L (ref 3.5–5.1)
POTASSIUM SERPL-SCNC: 4.5 MMOL/L (ref 3.5–5.1)
RBC # BLD AUTO: 3.26 MILL/UL (ref 4.7–6.1)
SODIUM SERPL-SCNC: 131 MMOL/L (ref 137–146)
SODIUM SERPL-SCNC: 131 MMOL/L (ref 137–146)

## 2021-04-03 NOTE — NUR
PM MEDICATION GIVEN PER MD ORDERS. LORTAB GIVEN FOR GENERALIZED PAIN AND
BENADRYL GIVEN FOR ITCHING PER PATIENT REQUEST. HS SNACK AND FRESH WATER
GIVEN.

## 2021-04-03 NOTE — NUR
awake in bed; offers no complaints; admit to pain relief; no apparent distress
noted; bumex gtt continued; sr on monitor; shay to gravity; o2 per nc; call
light within reach; will continue to monitor

## 2021-04-03 NOTE — NUR
pt sitting in chair eating lunch; pt offers no complaints; o2 per nc; iv
intact and patent; bumex gtt cont; sr on monitor; shay to gravity; call
light within reach

## 2021-04-03 NOTE — NUR
REPORT GIVEN BY BRANDYN. PATIENT WATCHING TV SITTING UP IN BED. RESP EVEN AND
UNLABORED, 2L VIA NC IN PLACE. FALL AND SAFTEY PRECAUTIONS IN PLACE. IV
INFUSING BUMEX. LOPEZ DRAINING PALE YELLOW URINE. PLAN OF CARE DISCUSSED.
PATIENT INFORMED TO CALL WITH ANY QUESTIOS OR CONCERNS.

## 2021-04-03 NOTE — NUR
pt resting in bed with eyes closed; easily aroused; assessment completed at
this time; pt offers complaints of headache; will medicate; no n/v noted; resp
even and unlabored; lungs clear/ diminished bases; skin color pale; o2 per nc
at 2L; hr reg; weak pedal pulses; anasarca/ generalized edema noted; sr on
monitor; abd soft with bs present; no bm noted; miralx to be given; shay to
gravity draining clear yellow urine; #20 to rac patent with bumex gtt infusing
without complication; #20 flushed and patent to cornelia; no redness or edema noted
at sites; scaley skin noted to bilat shins; plan of care/ am meds explained;
call light within reach; will continue to monitor

## 2021-04-03 NOTE — NUR
awake in bed; assisted to bsc for bm; pt offers no complaints; iv intact and
patent; sr on monitor; o2 per nc; shay to gravity; call light within reach

## 2021-04-03 NOTE — NUR
awake in recliner conversing on cell phone; pt offers no complaints; no
apparent distress noted; sr on monitor; shay to gravity; iv intact and
patent; bumex gtt continued; bumex gtt to continue for additional day per
Dr Quintero verbal orders; call light within reach; will continue to monitor

## 2021-04-03 NOTE — NUR
no apparent distress noted; pt offers no complaints; iv intact and patent;
bumex continued; o2 per nc; sr on monitor; call light within reach; will
continue to monitor

## 2021-04-03 NOTE — NUR
pt sitting on the side of bed eating breakfast; no apparent distress noted; sr
on monitor; shay to gravity; o2 per nc; bumex gtt continues; call light
within reach; will continue to monitor

## 2021-04-04 VITALS — DIASTOLIC BLOOD PRESSURE: 52 MMHG | SYSTOLIC BLOOD PRESSURE: 128 MMHG

## 2021-04-04 VITALS — DIASTOLIC BLOOD PRESSURE: 60 MMHG | SYSTOLIC BLOOD PRESSURE: 163 MMHG

## 2021-04-04 VITALS — DIASTOLIC BLOOD PRESSURE: 86 MMHG | SYSTOLIC BLOOD PRESSURE: 146 MMHG

## 2021-04-04 VITALS — SYSTOLIC BLOOD PRESSURE: 122 MMHG | DIASTOLIC BLOOD PRESSURE: 57 MMHG

## 2021-04-04 VITALS — DIASTOLIC BLOOD PRESSURE: 51 MMHG | SYSTOLIC BLOOD PRESSURE: 154 MMHG

## 2021-04-04 VITALS — SYSTOLIC BLOOD PRESSURE: 114 MMHG | DIASTOLIC BLOOD PRESSURE: 41 MMHG

## 2021-04-04 VITALS — SYSTOLIC BLOOD PRESSURE: 138 MMHG | DIASTOLIC BLOOD PRESSURE: 60 MMHG

## 2021-04-04 VITALS — DIASTOLIC BLOOD PRESSURE: 79 MMHG | SYSTOLIC BLOOD PRESSURE: 110 MMHG

## 2021-04-04 VITALS — DIASTOLIC BLOOD PRESSURE: 63 MMHG | SYSTOLIC BLOOD PRESSURE: 117 MMHG

## 2021-04-04 VITALS — SYSTOLIC BLOOD PRESSURE: 146 MMHG | DIASTOLIC BLOOD PRESSURE: 71 MMHG

## 2021-04-04 VITALS — SYSTOLIC BLOOD PRESSURE: 140 MMHG | DIASTOLIC BLOOD PRESSURE: 57 MMHG

## 2021-04-04 VITALS — SYSTOLIC BLOOD PRESSURE: 132 MMHG | DIASTOLIC BLOOD PRESSURE: 50 MMHG

## 2021-04-04 VITALS — DIASTOLIC BLOOD PRESSURE: 58 MMHG | SYSTOLIC BLOOD PRESSURE: 132 MMHG

## 2021-04-04 VITALS — SYSTOLIC BLOOD PRESSURE: 146 MMHG | DIASTOLIC BLOOD PRESSURE: 74 MMHG

## 2021-04-04 VITALS — SYSTOLIC BLOOD PRESSURE: 144 MMHG | DIASTOLIC BLOOD PRESSURE: 66 MMHG

## 2021-04-04 VITALS — DIASTOLIC BLOOD PRESSURE: 51 MMHG | SYSTOLIC BLOOD PRESSURE: 144 MMHG

## 2021-04-04 VITALS — SYSTOLIC BLOOD PRESSURE: 113 MMHG | DIASTOLIC BLOOD PRESSURE: 57 MMHG

## 2021-04-04 VITALS — DIASTOLIC BLOOD PRESSURE: 54 MMHG | SYSTOLIC BLOOD PRESSURE: 117 MMHG

## 2021-04-04 VITALS — SYSTOLIC BLOOD PRESSURE: 132 MMHG | DIASTOLIC BLOOD PRESSURE: 68 MMHG

## 2021-04-04 VITALS — DIASTOLIC BLOOD PRESSURE: 77 MMHG | SYSTOLIC BLOOD PRESSURE: 125 MMHG

## 2021-04-04 VITALS — DIASTOLIC BLOOD PRESSURE: 51 MMHG | SYSTOLIC BLOOD PRESSURE: 145 MMHG

## 2021-04-04 VITALS — SYSTOLIC BLOOD PRESSURE: 159 MMHG | DIASTOLIC BLOOD PRESSURE: 66 MMHG

## 2021-04-04 VITALS — DIASTOLIC BLOOD PRESSURE: 73 MMHG | SYSTOLIC BLOOD PRESSURE: 159 MMHG

## 2021-04-04 LAB
ALBUMIN SERPL-MCNC: 2.7 G/DL (ref 3.2–5)
ALP SERPL-CCNC: 125 U/L (ref 38–126)
ANION GAP SERPL CALCULATED.3IONS-SCNC: 10 MMOL/L
AST SERPL-CCNC: 19 U/L (ref 19–48)
BASOPHILS NFR BLD AUTO: 1 % (ref 0–3)
BUN SERPL-MCNC: 76 MG/DL (ref 8–23)
BUN/CREAT SERPL: 35
CHLORIDE SERPL-SCNC: 94 MMOL/L (ref 95–108)
CO2 SERPL-SCNC: 32 MMOL/L (ref 22–30)
CREAT SERPL-MCNC: 2.2 MG/DL (ref 0.7–1.3)
EOSINOPHIL NFR BLD AUTO: 4 % (ref 0–8)
ERYTHROCYTE [DISTWIDTH] IN BLOOD BY AUTOMATED COUNT: 16.4 % (ref 11.5–15.5)
HCT VFR BLD AUTO: 25.8 % (ref 39–50)
HGB BLD-MCNC: 7.6 G/DL (ref 14–18)
IMM GRANULOCYTES NFR BLD: 0.4 % (ref 0–5)
LYMPHOCYTES NFR BLD: 33 % (ref 15–41)
MCH RBC QN AUTO: 25 PG  CALC (ref 26–32)
MCHC RBC AUTO-ENTMCNC: 29.5 G/DL CAL (ref 32–36)
MCV RBC AUTO: 84.9 FL  CALC (ref 80–100)
MONOCYTES NFR BLD AUTO: 20 % (ref 2–13)
NEUTROPHILS # BLD AUTO: 1.06 THOU/UL (ref 1.82–7.42)
NEUTROPHILS NFR BLD AUTO: 43 % (ref 42–76)
PLATELET # BLD AUTO: 151 THOU/UL (ref 130–400)
POTASSIUM SERPL-SCNC: 4.4 MMOL/L (ref 3.5–5.1)
PROT SERPL-MCNC: 5.4 G/DL (ref 6.3–8.2)
RBC # BLD AUTO: 3.04 MILL/UL (ref 4.7–6.1)
SODIUM SERPL-SCNC: 132 MMOL/L (ref 137–146)

## 2021-04-04 PROCEDURE — 30233N1 TRANSFUSION OF NONAUTOLOGOUS RED BLOOD CELLS INTO PERIPHERAL VEIN, PERCUTANEOUS APPROACH: ICD-10-PCS | Performed by: INTERNAL MEDICINE

## 2021-04-04 NOTE — NUR
PATIENT RESTING WITH EYES CLOSED. RESP EVEN AND UNLABORED. NO S/S OF DISTRESS
NOTED FALL AND SAFTEY PRECAUTIONS IN PLACE.

## 2021-04-04 NOTE — NUR
ASKED PT IF HE WANTED TO HAVE A SPONGE BATH AND GET CLEANED UP.  HE STATES
MAYBE LATER, HE STILL FEELS TIRED.  PT APPEARS TO HAVE MORE COLOR TO HIS SKIN
FROM THIS AM.  STATES FEELS A LITTLE BETTER AFTER UNIT OF BLOOD, BUT NOT GREAT
YET.  ADVISED WILL HAVE BLOOD DONE TOMORROW AM TO SEE IF NEEDS MORE. VITAL
SIGNS REMAIN STABLE.

## 2021-04-04 NOTE — NUR
REPORT GIVEN BY BEBE. PATIENT IN THE RECLINER AT THE BEDSIDE. RESP EVEN
AND UNLABORED. C/O NAUSEA, WILL MEDICATE. IV SALINE LOCKED. FALL AND SAFTEY
PRECAUTIONS IN PLACE. PLAN OF CARE DISCUSSED. LOPEZ DRAINING CLEAR YELLOW
URINE. PATIENT INFORMED TO CALL WITH ANY QUESTIONS OR CONCERNS.

## 2021-04-04 NOTE — NUR
PT REPORT RECEIVED FROM NIGHT SHIFT.  PT RESTING QUIETLY ON BED, URINE OUTPUT
GOOD.  PT DENIES ANY COMPLAINT AT THIS TIME

## 2021-04-04 NOTE — NUR
PT REMAINS SITTING UP IN CHAIR WATCHING TV,  OXYGEN REMAINS OFF WITH SATS
98-99,  ADVISED IF HE STARTS HAVING TROUBLE OR OXYGEN DROPS, WE WILL HAVE TO
PUT IT BACK ON,  HE SAYS HE WANTS TO WAIT.

## 2021-04-04 NOTE — NUR
PATIENT REQUESTING TO GO BACK TO BED. PATIENT PLACED IN BED WITHOUT DIFFCULTY.
FALL AND SAFTEY PRECAUTIONS IN PLACE.

## 2021-04-04 NOTE — NUR
PT SITTING UP ON SIDE OF BED,  STATES TIRED , DOESNT WANT TO WATCH TV, DOESNT
WANT TO GET UP IN RECLINER JUST WANTS TO GO TO SLEEP,,  BLOOD STILL
TRANSFUSING, IV SITE LOOKS HEALTHY.

## 2021-04-05 VITALS — SYSTOLIC BLOOD PRESSURE: 140 MMHG | DIASTOLIC BLOOD PRESSURE: 64 MMHG

## 2021-04-05 VITALS — DIASTOLIC BLOOD PRESSURE: 58 MMHG | SYSTOLIC BLOOD PRESSURE: 145 MMHG

## 2021-04-05 VITALS — DIASTOLIC BLOOD PRESSURE: 54 MMHG | SYSTOLIC BLOOD PRESSURE: 132 MMHG

## 2021-04-05 VITALS — DIASTOLIC BLOOD PRESSURE: 68 MMHG | SYSTOLIC BLOOD PRESSURE: 147 MMHG

## 2021-04-05 VITALS — DIASTOLIC BLOOD PRESSURE: 55 MMHG | SYSTOLIC BLOOD PRESSURE: 139 MMHG

## 2021-04-05 VITALS — SYSTOLIC BLOOD PRESSURE: 149 MMHG | DIASTOLIC BLOOD PRESSURE: 60 MMHG

## 2021-04-05 VITALS — DIASTOLIC BLOOD PRESSURE: 56 MMHG | SYSTOLIC BLOOD PRESSURE: 145 MMHG

## 2021-04-05 VITALS — DIASTOLIC BLOOD PRESSURE: 86 MMHG | SYSTOLIC BLOOD PRESSURE: 175 MMHG

## 2021-04-05 VITALS — DIASTOLIC BLOOD PRESSURE: 63 MMHG | SYSTOLIC BLOOD PRESSURE: 156 MMHG

## 2021-04-05 VITALS — SYSTOLIC BLOOD PRESSURE: 143 MMHG | DIASTOLIC BLOOD PRESSURE: 61 MMHG

## 2021-04-05 VITALS — DIASTOLIC BLOOD PRESSURE: 71 MMHG | SYSTOLIC BLOOD PRESSURE: 155 MMHG

## 2021-04-05 VITALS — DIASTOLIC BLOOD PRESSURE: 58 MMHG | SYSTOLIC BLOOD PRESSURE: 130 MMHG

## 2021-04-05 VITALS — SYSTOLIC BLOOD PRESSURE: 141 MMHG | DIASTOLIC BLOOD PRESSURE: 66 MMHG

## 2021-04-05 VITALS — SYSTOLIC BLOOD PRESSURE: 123 MMHG | DIASTOLIC BLOOD PRESSURE: 54 MMHG

## 2021-04-05 VITALS — SYSTOLIC BLOOD PRESSURE: 142 MMHG | DIASTOLIC BLOOD PRESSURE: 78 MMHG

## 2021-04-05 VITALS — SYSTOLIC BLOOD PRESSURE: 170 MMHG | DIASTOLIC BLOOD PRESSURE: 76 MMHG

## 2021-04-05 LAB
ALBUMIN SERPL-MCNC: 2.8 G/DL (ref 3.2–5)
ALP SERPL-CCNC: 128 U/L (ref 38–126)
ANION GAP SERPL CALCULATED.3IONS-SCNC: 16 MMOL/L
AST SERPL-CCNC: 23 U/L (ref 19–48)
BASOPHILS NFR BLD AUTO: 1 % (ref 0–3)
BUN SERPL-MCNC: 81 MG/DL (ref 8–23)
BUN/CREAT SERPL: 48
CHLORIDE SERPL-SCNC: 89 MMOL/L (ref 95–108)
CO2 SERPL-SCNC: 30 MMOL/L (ref 22–30)
CREAT SERPL-MCNC: 1.7 MG/DL (ref 0.7–1.3)
EOSINOPHIL NFR BLD AUTO: 3 % (ref 0–8)
ERYTHROCYTE [DISTWIDTH] IN BLOOD BY AUTOMATED COUNT: 15.9 % (ref 11.5–15.5)
HCT VFR BLD AUTO: 29.5 % (ref 39–50)
HGB BLD-MCNC: 9 G/DL (ref 14–18)
IMM GRANULOCYTES NFR BLD: 0.6 % (ref 0–5)
LYMPHOCYTES NFR BLD: 28 % (ref 15–41)
MCH RBC QN AUTO: 25.8 PG  CALC (ref 26–32)
MCHC RBC AUTO-ENTMCNC: 30.5 G/DL CAL (ref 32–36)
MCV RBC AUTO: 84.5 FL  CALC (ref 80–100)
MONOCYTES NFR BLD AUTO: 15 % (ref 2–13)
NEUTROPHILS # BLD AUTO: 1.77 THOU/UL (ref 1.82–7.42)
NEUTROPHILS NFR BLD AUTO: 53 % (ref 42–76)
PLATELET # BLD AUTO: 161 THOU/UL (ref 130–400)
POTASSIUM SERPL-SCNC: 4.6 MMOL/L (ref 3.5–5.1)
PROT SERPL-MCNC: 5.6 G/DL (ref 6.3–8.2)
RBC # BLD AUTO: 3.49 MILL/UL (ref 4.7–6.1)
SODIUM SERPL-SCNC: 130 MMOL/L (ref 137–146)

## 2021-04-05 NOTE — NUR
PT MEDICATED AS ORDERS PROVIDE AND ASSESSMENT COMPLETED AT THIS TIME. PT HAS
SCRATCHES TO THE BACK OF HIS THIGHS FROM SCRATCHING. BENEDRYL PROVIDED AND
SCRATCHES CLEANED. PT ALSO MEDICATED FOR PAIN AT THIS TIME AS REQUESTED. LOPEZ
CATH IN PLACE, INSERTION SITE APPEARS INTACT WITH STRAP TO RUE. EDEMA TRACE TO
BLE, SKIN TO SAME IS SCALY W/MINOR SCRATCHES CLOSED. CALL LIGHT W/IN REACH, PT
DENIES ANY OTHER NEEDS AT THIS TIME, BUT ENCOURAGED HIM TO CALL AS NEEDS
ARISE.

## 2021-04-05 NOTE — NUR
awake sitting on the side of bed eating lunch; no apparent distress noted; iv
intact; sr on monitor; shay to gravity; o2 per nc; pt offers no complaints;
informed of possible transfer to med surg/ agreeable; call light within reach;
will continue to monitor

## 2021-04-05 NOTE — NUR
resting in bed with eyes closed; no apparent distress noted; iv intact and
saline locked; sr on monitor; shay to gravity; call light within reach; will
continue to monitor

## 2021-04-05 NOTE — NUR
pt awake in bed; complaints of pain; will medicate; iv intact; sr on monitor;
shay to gravity; o2 per nc; call light within reach; will continue to monitor

## 2021-04-05 NOTE — NUR
pt noted sitting on the side of bed eating breakfast; no apparent distress
noted; pt offers no complaints; iv intact; sr on monitor; call light within
reach; will continue to monitor

## 2021-04-05 NOTE — NUR
resting in bed with eyes closed; no apparent distress noted; sr on monitor; o2
per nc; iv intact; call light within reach; will continue to monitor

## 2021-04-05 NOTE — NUR
pt resting in bed with eyes closed; easily aroused; no apparent distress
noted; assessment completed at this time; pt offers complaints of pain; will
medicate; no n/v noted; resp even and unlabored; lungs clear/ diminished
bases; skin color pale; o2 per nc at 2L; np cough noted; hr reg; weak pedal
pulses; 2+ edema noted generalized; sr on monitor; abd soft with bs present;
no bm noted per writer; pt admits to feeling bloating/ pt passing flatus;
shay to gravity draining well; cath strap intact; #20 saline locked to rac;
no redness or edema noted at site; svcaley dry skin noted to ble; slight
redness noted to ble; pt admits to bloating and feeling genralized tightness;
plan of care/ am meds explained; call light within reach; will continue to
monitor

## 2021-04-05 NOTE — NUR
PT ASSISTED TO BSC AND BACK SITTING ON THE SIDE OF THE BED. PT MENTIONED THAT
HE THINKS WE NEED A STOOL SAMPLE FOR LAB, COLLECTED AND DELIVERED TO LAB AT
THIS TIME PER ORDERS. PT C/O STILL HAVING GENERALIZED PAIN "ALL OVER," BUT
REPORTS THAT MEDICATION IS HELPING SLIGHTLY.

## 2021-04-05 NOTE — NUR
pt transferred to med surg tele room 274 via wc with portable o2 in stable
condition; accompanied by PATRICIA Terrazas RN in stable condition; pt belongings
with pt;

## 2021-04-06 VITALS — DIASTOLIC BLOOD PRESSURE: 57 MMHG | SYSTOLIC BLOOD PRESSURE: 131 MMHG

## 2021-04-06 VITALS — DIASTOLIC BLOOD PRESSURE: 63 MMHG | SYSTOLIC BLOOD PRESSURE: 142 MMHG

## 2021-04-06 VITALS — DIASTOLIC BLOOD PRESSURE: 72 MMHG | SYSTOLIC BLOOD PRESSURE: 156 MMHG

## 2021-04-06 VITALS — DIASTOLIC BLOOD PRESSURE: 66 MMHG | SYSTOLIC BLOOD PRESSURE: 133 MMHG

## 2021-04-06 VITALS — DIASTOLIC BLOOD PRESSURE: 74 MMHG | SYSTOLIC BLOOD PRESSURE: 154 MMHG

## 2021-04-06 VITALS — SYSTOLIC BLOOD PRESSURE: 122 MMHG | DIASTOLIC BLOOD PRESSURE: 50 MMHG

## 2021-04-06 LAB
ANION GAP SERPL CALCULATED.3IONS-SCNC: 10 MMOL/L
BUN SERPL-MCNC: 78 MG/DL (ref 8–23)
BUN/CREAT SERPL: 54
CHLORIDE SERPL-SCNC: 96 MMOL/L (ref 95–108)
CO2 SERPL-SCNC: 30 MMOL/L (ref 22–30)
CREAT SERPL-MCNC: 1.4 MG/DL (ref 0.7–1.3)
ERYTHROCYTE [DISTWIDTH] IN BLOOD BY AUTOMATED COUNT: 16.2 % (ref 11.5–15.5)
HCT VFR BLD AUTO: 29.2 % (ref 39–50)
HGB BLD-MCNC: 8.8 G/DL (ref 14–18)
MCH RBC QN AUTO: 25.7 PG  CALC (ref 26–32)
MCHC RBC AUTO-ENTMCNC: 30.1 G/DL CAL (ref 32–36)
MCV RBC AUTO: 85.1 FL  CALC (ref 80–100)
PLATELET # BLD AUTO: 156 THOU/UL (ref 130–400)
POTASSIUM SERPL-SCNC: 4.9 MMOL/L (ref 3.5–5.1)
RBC # BLD AUTO: 3.43 MILL/UL (ref 4.7–6.1)
SODIUM SERPL-SCNC: 132 MMOL/L (ref 137–146)

## 2021-04-06 NOTE — NUR
PT SITTING ON THE SIDE OF THE BED EATING LUNCH. NO DISTRESS NOTED OR NEEDS AT
THIS TIME. CALL LIGHT LEFT WITHIN REACH.

## 2021-04-06 NOTE — NUR
PATIENT RESTING IN BED AT THIS TIME WITH O2 VIA NASAL CANNULA IN PLACE.
PATIENT IS AWAKE ALERT AND ORIENTEDX3 C/O ITCHING AND GENERALIZED PAIN-8/10 ON
PAIN SCALE. PATIENT MEDICATED WITH BENEDRYL FOR ITCH AND LORTAB 5/325MG FOR
PAIN. SALINE LOCK TO LAC INTACT AND HEALTHY AT THIS TIME-BUMEX 1MG IVP GIVEN
AS SCHEDULED. VOIDED 250CC OF LINDA URINE IN URINAL. CONT TO HAVE
ANASARCA-GENERALIZED SWELLING ALL OVER HIS BODY. TELE MONITOR IN PLACE. SAFETY
PRECAUTIONS REINFORCED. CALL LIGHT IN REACH. WILL CONT TO MONITOR.

## 2021-04-06 NOTE — NUR
UPON ENETERING ROOM TO D/C PT, PT C/O OF NOT FEELING WELL. AND WISHES TO STAY
"JUST TONIGHT AND LEAVE EARLY MORNING". EXPLAINED TO PT THAT D/C PLANNING WAS
GIVEN THIS MORNING BY DR ANDRADE AND MAYUR POOLE AND BY THIS WRITER. PT
STATES THAT HE DOES NOT HAVE ANYONE TO GET HIS WALKER FROM INSIDE HIS HOME AND
THAT HIS NEIGHBOR/RIDE WOULD NOT BE HOME TO HELP HIM TODAY BUT THEY ASSURED
HIM THAT THEY WOULD BE HOME TOMORROW MORNING TO HELP HIM GET SITUATED AFTER
DISCHARGE. CONCERNS COMMUNICAYED WITH DR ANDRADE, DR ANDRADE AGREEABLE FOR PT
TO STAY JUST TONIGHT AND BE D/C IN THE MORNING. PT UPDATED, AND EXPLAINED THAT
HE WOULD BE STAYING TONIGHT BUT WOULD BE D/C IN THE MORNING. PT AGREEABLE,
[facility] IN PLACE FOR PT, D/C INSTRUCTIONS FINALIZED AND TYPED,
PLACED IN CHART.

## 2021-04-06 NOTE — NUR
PT LAYING ON THE SIDE OF THE BED. A&O X3. EXERTIONAL SOB NOTED. CLEAR BRATH
SOUNDS UPON AUSCULTATION. O2 VIA NC @2L IN PLACE. ACTIVE BOWEL SOUNDS X4
QUADRANTS. PT C/O OF GENERALIZED PAIN 6/10 AND  FEELING "ITCHY". LOPEZ
CATHETER DRAINING VIA GRAVITY WITH CLEAR YELLOW URINE.  DRYNESS NOTED TO BLE.
EDEMA NOTED TO BLE, NO PITTING EDEMA AT THIS TIME. TELEMETRY MONITOR IN PLACE.
#20 RAC HEALTHY AND PATENT. ASSESSMENT COMPLETED. DISCUSSED POC. CALL LIGHT
LEFT WITHIN REACH.

## 2021-04-06 NOTE — NUR
PATIENT CALLED AND STATES THAT HE IS NOT FEELING GOOD AT THIS TIME-RESPONDED
TO PATIENT ROOM TO FIND HIM SITTING ON THE SIDE OF THE BED WITH O2 VIA NASAL
CANNULA IN PLACE. PATIENT STATES THAT HE JUST ATE HIS HS SNACK OF CHOCLATE
PUDDING AND IS FEELING "BAD". ACCU-CHECK WAS DONE AND  AT THIS TIME. VS
TAKEN AND WAS BP-170/78, HR-87 AND O2 SAT IS 99%. PATIENT WAS MEDICATED WITH
MYLANTA 2 30CC FOR INDIGESTION AND REGLAN 5MG PO FOR INDIGESTION. MEDICATED
FOR PAIN WITH LORTAB 5/325MG PO. PATIENT PAD ON THE BED WAS STAINED WITH STOOL
AND REPLACED.PROOVIDED WITH COOL CLOTH FOR HIS HEAD. SAFETY PRECAUTIONS
REINFORCED. CALL LIGHT IN REACH. WILL CONT TO MONITOR.

## 2021-04-06 NOTE — NUR
ACCU-CHECK -MEDICATED WITH HUMALOG 2UNITS PER HUMALOG SLIDING SCALE
PROTOCOL. HS SNACK PROVIDED. CONT TO VOID YELLOW URINE IN URINAL. STATES SOME
RELIEF FROM PAIN MEDS GIVEN EARLIER. CALL LIGHT IN REACH. WILL CONT TO MONITOR

## 2021-04-07 VITALS — SYSTOLIC BLOOD PRESSURE: 170 MMHG | DIASTOLIC BLOOD PRESSURE: 78 MMHG

## 2021-04-07 VITALS — DIASTOLIC BLOOD PRESSURE: 75 MMHG | SYSTOLIC BLOOD PRESSURE: 167 MMHG

## 2021-04-07 VITALS — SYSTOLIC BLOOD PRESSURE: 151 MMHG | DIASTOLIC BLOOD PRESSURE: 74 MMHG

## 2021-04-07 VITALS — SYSTOLIC BLOOD PRESSURE: 167 MMHG | DIASTOLIC BLOOD PRESSURE: 75 MMHG

## 2021-04-07 NOTE — NUR
Discharge instructions given. Patient verbalizes understanding of same.
Discharged in stable condition via Wheelchair to Home with
. All belongings sent with pt.
 
PT WAS GIVEN DISCHARGE PACKET;DISCHARGE INSTRUCTIONS WERE EXPLAINED TO PT;PT
VERBALIZED UNDERSTANDING AND HAD NO FURTHER QUESTIONS AT THIS TIME;PT
SIGNATURE WAS OBTAINED;PT WILL BE DISCHARGED HOME WITH PHYSICAL THERAPY AND
HOME HEALTH;[facility] WILL BE PROVIDING THE HOME HEALTH FOR HIM.

## 2021-04-07 NOTE — NUR
PATIENT RESTING IN BED WITH O2 VIA NASAL CANNULA IN PLACE. EYES ARE CLOSED.
RESPS ARE EVEN AND UNLABORED. TELE MONITOR IN PLACE. CALL LIGHT IN REACH. WILL
CONT TO MONITOR.

## 2021-04-07 NOTE — NUR
PT WAS FOUND SITTING ON EDGE OF BED EATING BREAKFAST;PT IS A&O X3;VS AND
ASSESSMENT WERE COMPLETED;PT HAS NO REPORTS OF PAIN AT THIS TIME;HEART SOUNDS
ARE REGULAR IN RATE AND RHYTHM;LUNG SOUNDS ARE CLEAR AND DIMINISHED IN LOWER
LOBES;RESPIRATIONS ARE EVEN AND UNLABORED ON O2 @2L VIA NC;#20G IV IN LAC IS
SL, PATENT AND FREE OF COMPLICATIONS;PT HAS EDEMA PRESENT IN LOWER EXTREMETIES
BILATERALLY;PT ALSO HAS REDDENING AND SCALY PATCHES PRESENT ON LOWER
EXTREMETIES BILATERALLY AS WELL;SAFETY PRECAUTIONS IN PLACE;CALL LIGHT WITHIN
REACH;BED IN LOWEST POSITION;WILL CONTINUE TO MONITOR.

## 2021-04-07 NOTE — NUR
PATIENT CALLED C/O ITCHING-MEDICATED WITH BENEDRYL 25MG PO FOR ITCHING. TELE
MONITOR IN PLACE. SALINE LOCK INTACT TO LAC. CALL LIGHT IN REACH. WILL CONT TO
MONITOR.

## 2021-04-10 ENCOUNTER — HOSPITAL ENCOUNTER (INPATIENT)
Dept: HOSPITAL 82 - ED | Age: 61
LOS: 9 days | Discharge: HOME HEALTH | DRG: 291 | End: 2021-04-19
Attending: INTERNAL MEDICINE | Admitting: INTERNAL MEDICINE
Payer: MEDICARE

## 2021-04-10 VITALS — SYSTOLIC BLOOD PRESSURE: 142 MMHG | DIASTOLIC BLOOD PRESSURE: 79 MMHG

## 2021-04-10 VITALS — DIASTOLIC BLOOD PRESSURE: 64 MMHG | SYSTOLIC BLOOD PRESSURE: 146 MMHG

## 2021-04-10 VITALS — SYSTOLIC BLOOD PRESSURE: 140 MMHG | DIASTOLIC BLOOD PRESSURE: 77 MMHG

## 2021-04-10 VITALS — DIASTOLIC BLOOD PRESSURE: 66 MMHG | SYSTOLIC BLOOD PRESSURE: 139 MMHG

## 2021-04-10 VITALS — DIASTOLIC BLOOD PRESSURE: 79 MMHG | SYSTOLIC BLOOD PRESSURE: 134 MMHG

## 2021-04-10 VITALS — SYSTOLIC BLOOD PRESSURE: 156 MMHG | DIASTOLIC BLOOD PRESSURE: 71 MMHG

## 2021-04-10 VITALS — DIASTOLIC BLOOD PRESSURE: 80 MMHG | SYSTOLIC BLOOD PRESSURE: 158 MMHG

## 2021-04-10 VITALS — HEIGHT: 71 IN | WEIGHT: 257.94 LBS | BODY MASS INDEX: 36.11 KG/M2

## 2021-04-10 VITALS — SYSTOLIC BLOOD PRESSURE: 134 MMHG | DIASTOLIC BLOOD PRESSURE: 71 MMHG

## 2021-04-10 VITALS — SYSTOLIC BLOOD PRESSURE: 149 MMHG | DIASTOLIC BLOOD PRESSURE: 79 MMHG

## 2021-04-10 VITALS — DIASTOLIC BLOOD PRESSURE: 79 MMHG | SYSTOLIC BLOOD PRESSURE: 142 MMHG

## 2021-04-10 VITALS — SYSTOLIC BLOOD PRESSURE: 171 MMHG | DIASTOLIC BLOOD PRESSURE: 82 MMHG

## 2021-04-10 DIAGNOSIS — Z79.4: ICD-10-CM

## 2021-04-10 DIAGNOSIS — Z87.891: ICD-10-CM

## 2021-04-10 DIAGNOSIS — Z20.822: ICD-10-CM

## 2021-04-10 DIAGNOSIS — Z79.02: ICD-10-CM

## 2021-04-10 DIAGNOSIS — K31.84: ICD-10-CM

## 2021-04-10 DIAGNOSIS — Z92.3: ICD-10-CM

## 2021-04-10 DIAGNOSIS — N17.9: ICD-10-CM

## 2021-04-10 DIAGNOSIS — H93.13: ICD-10-CM

## 2021-04-10 DIAGNOSIS — Z85.819: ICD-10-CM

## 2021-04-10 DIAGNOSIS — Z95.5: ICD-10-CM

## 2021-04-10 DIAGNOSIS — E11.22: ICD-10-CM

## 2021-04-10 DIAGNOSIS — I25.118: ICD-10-CM

## 2021-04-10 DIAGNOSIS — R20.0: ICD-10-CM

## 2021-04-10 DIAGNOSIS — Z92.21: ICD-10-CM

## 2021-04-10 DIAGNOSIS — Z95.3: ICD-10-CM

## 2021-04-10 DIAGNOSIS — Z60.2: ICD-10-CM

## 2021-04-10 DIAGNOSIS — E78.5: ICD-10-CM

## 2021-04-10 DIAGNOSIS — I48.91: ICD-10-CM

## 2021-04-10 DIAGNOSIS — Z86.16: ICD-10-CM

## 2021-04-10 DIAGNOSIS — I50.23: ICD-10-CM

## 2021-04-10 DIAGNOSIS — I13.0: Primary | ICD-10-CM

## 2021-04-10 DIAGNOSIS — K72.90: ICD-10-CM

## 2021-04-10 DIAGNOSIS — Z23: ICD-10-CM

## 2021-04-10 DIAGNOSIS — N18.9: ICD-10-CM

## 2021-04-10 DIAGNOSIS — I08.0: ICD-10-CM

## 2021-04-10 DIAGNOSIS — Z95.1: ICD-10-CM

## 2021-04-10 DIAGNOSIS — Z86.73: ICD-10-CM

## 2021-04-10 DIAGNOSIS — D64.9: ICD-10-CM

## 2021-04-10 DIAGNOSIS — K21.9: ICD-10-CM

## 2021-04-10 DIAGNOSIS — N13.8: ICD-10-CM

## 2021-04-10 DIAGNOSIS — N40.1: ICD-10-CM

## 2021-04-10 DIAGNOSIS — H54.7: ICD-10-CM

## 2021-04-10 DIAGNOSIS — E66.9: ICD-10-CM

## 2021-04-10 DIAGNOSIS — E11.43: ICD-10-CM

## 2021-04-10 LAB
ALBUMIN SERPL-MCNC: 3.2 G/DL (ref 3.2–5)
ALP SERPL-CCNC: 155 U/L (ref 38–126)
AMYLASE SERPL-CCNC: 54 U/L (ref 30–110)
ANION GAP SERPL CALCULATED.3IONS-SCNC: 9 MMOL/L
AST SERPL-CCNC: 24 U/L (ref 19–48)
BASOPHILS NFR BLD AUTO: 1 % (ref 0–3)
BUN SERPL-MCNC: 69 MG/DL (ref 8–23)
BUN/CREAT SERPL: 43
CHLORIDE SERPL-SCNC: 99 MMOL/L (ref 95–108)
CO2 SERPL-SCNC: 31 MMOL/L (ref 22–30)
CREAT SERPL-MCNC: 1.6 MG/DL (ref 0.7–1.3)
EOSINOPHIL NFR BLD AUTO: 4 % (ref 0–8)
ERYTHROCYTE [DISTWIDTH] IN BLOOD BY AUTOMATED COUNT: 17.2 % (ref 11.5–15.5)
HCT VFR BLD AUTO: 25.8 % (ref 39–50)
HGB BLD-MCNC: 7.9 G/DL (ref 14–18)
IMM GRANULOCYTES NFR BLD: 0.5 % (ref 0–5)
INR PPP: 1.1 RATIO (ref 0.7–1.3)
LIPASE SERPL-CCNC: 50 U/L (ref 23–300)
LYMPHOCYTES NFR BLD: 20 % (ref 15–41)
MCH RBC QN AUTO: 26.4 PG  CALC (ref 26–32)
MCHC RBC AUTO-ENTMCNC: 30.6 G/DL CAL (ref 32–36)
MCV RBC AUTO: 86.3 FL  CALC (ref 80–100)
MONOCYTES NFR BLD AUTO: 11 % (ref 2–13)
MYOGLOBIN SERPL-MCNC: 258 NG/ML (ref 0–121)
NEUTROPHILS # BLD AUTO: 2.6 THOU/UL (ref 1.82–7.42)
NEUTROPHILS NFR BLD AUTO: 64 % (ref 42–76)
PLATELET # BLD AUTO: 155 THOU/UL (ref 130–400)
POTASSIUM SERPL-SCNC: 4.5 MMOL/L (ref 3.5–5.1)
PROT SERPL-MCNC: 6.3 G/DL (ref 6.3–8.2)
PROTHROMBIN TIME: 11.4 SECONDS (ref 9–12.5)
RBC # BLD AUTO: 2.99 MILL/UL (ref 4.7–6.1)
SODIUM SERPL-SCNC: 133 MMOL/L (ref 137–146)

## 2021-04-10 PROCEDURE — 30233N1 TRANSFUSION OF NONAUTOLOGOUS RED BLOOD CELLS INTO PERIPHERAL VEIN, PERCUTANEOUS APPROACH: ICD-10-PCS | Performed by: INTERNAL MEDICINE

## 2021-04-10 PROCEDURE — P9016 RBC LEUKOCYTES REDUCED: HCPCS

## 2021-04-10 PROCEDURE — 3E02340 INTRODUCTION OF INFLUENZA VACCINE INTO MUSCLE, PERCUTANEOUS APPROACH: ICD-10-PCS | Performed by: INTERNAL MEDICINE

## 2021-04-10 SDOH — SOCIAL STABILITY - SOCIAL INSECURITY: PROBLEMS RELATED TO LIVING ALONE: Z60.2

## 2021-04-11 VITALS — DIASTOLIC BLOOD PRESSURE: 75 MMHG | SYSTOLIC BLOOD PRESSURE: 145 MMHG

## 2021-04-11 VITALS — DIASTOLIC BLOOD PRESSURE: 82 MMHG | SYSTOLIC BLOOD PRESSURE: 172 MMHG

## 2021-04-11 VITALS — SYSTOLIC BLOOD PRESSURE: 155 MMHG | DIASTOLIC BLOOD PRESSURE: 78 MMHG

## 2021-04-11 VITALS — DIASTOLIC BLOOD PRESSURE: 72 MMHG | SYSTOLIC BLOOD PRESSURE: 144 MMHG

## 2021-04-11 VITALS — DIASTOLIC BLOOD PRESSURE: 65 MMHG | SYSTOLIC BLOOD PRESSURE: 141 MMHG

## 2021-04-11 VITALS — DIASTOLIC BLOOD PRESSURE: 71 MMHG | SYSTOLIC BLOOD PRESSURE: 135 MMHG

## 2021-04-11 LAB
ALBUMIN SERPL-MCNC: 3.1 G/DL (ref 3.2–5)
ALP SERPL-CCNC: 125 U/L (ref 38–126)
ANION GAP SERPL CALCULATED.3IONS-SCNC: 13 MMOL/L
AST SERPL-CCNC: 30 U/L (ref 19–48)
BASOPHILS NFR BLD AUTO: 1 % (ref 0–3)
BUN SERPL-MCNC: 77 MG/DL (ref 8–23)
BUN/CREAT SERPL: 46
CHLORIDE SERPL-SCNC: 98 MMOL/L (ref 95–108)
CO2 SERPL-SCNC: 27 MMOL/L (ref 22–30)
CREAT SERPL-MCNC: 1.7 MG/DL (ref 0.7–1.3)
EOSINOPHIL NFR BLD AUTO: 5 % (ref 0–8)
ERYTHROCYTE [DISTWIDTH] IN BLOOD BY AUTOMATED COUNT: 17.4 % (ref 11.5–15.5)
HCT VFR BLD AUTO: 29.9 % (ref 39–50)
HGB BLD-MCNC: 9 G/DL (ref 14–18)
IMM GRANULOCYTES NFR BLD: 0.5 % (ref 0–5)
LYMPHOCYTES NFR BLD: 17 % (ref 15–41)
MCH RBC QN AUTO: 26.4 PG  CALC (ref 26–32)
MCHC RBC AUTO-ENTMCNC: 30.1 G/DL CAL (ref 32–36)
MCV RBC AUTO: 87.7 FL  CALC (ref 80–100)
MONOCYTES NFR BLD AUTO: 12 % (ref 2–13)
NEUTROPHILS # BLD AUTO: 2.7 THOU/UL (ref 1.82–7.42)
NEUTROPHILS NFR BLD AUTO: 65 % (ref 42–76)
PLATELET # BLD AUTO: 144 THOU/UL (ref 130–400)
POTASSIUM SERPL-SCNC: 5.2 MMOL/L (ref 3.5–5.1)
PROT SERPL-MCNC: 6.1 G/DL (ref 6.3–8.2)
RBC # BLD AUTO: 3.41 MILL/UL (ref 4.7–6.1)
SODIUM SERPL-SCNC: 133 MMOL/L (ref 137–146)

## 2021-04-12 VITALS — SYSTOLIC BLOOD PRESSURE: 162 MMHG | DIASTOLIC BLOOD PRESSURE: 72 MMHG

## 2021-04-12 VITALS — SYSTOLIC BLOOD PRESSURE: 133 MMHG | DIASTOLIC BLOOD PRESSURE: 67 MMHG

## 2021-04-12 VITALS — DIASTOLIC BLOOD PRESSURE: 62 MMHG | SYSTOLIC BLOOD PRESSURE: 154 MMHG

## 2021-04-12 VITALS — DIASTOLIC BLOOD PRESSURE: 74 MMHG | SYSTOLIC BLOOD PRESSURE: 157 MMHG

## 2021-04-12 VITALS — DIASTOLIC BLOOD PRESSURE: 57 MMHG | SYSTOLIC BLOOD PRESSURE: 114 MMHG

## 2021-04-12 VITALS — DIASTOLIC BLOOD PRESSURE: 47 MMHG | SYSTOLIC BLOOD PRESSURE: 103 MMHG

## 2021-04-12 LAB
ANION GAP SERPL CALCULATED.3IONS-SCNC: 9 MMOL/L
BUN SERPL-MCNC: 78 MG/DL (ref 8–23)
BUN/CREAT SERPL: 44
CHLORIDE SERPL-SCNC: 99 MMOL/L (ref 95–108)
CO2 SERPL-SCNC: 29 MMOL/L (ref 22–30)
CREAT SERPL-MCNC: 1.8 MG/DL (ref 0.7–1.3)
POTASSIUM SERPL-SCNC: 4.8 MMOL/L (ref 3.5–5.1)
SODIUM SERPL-SCNC: 133 MMOL/L (ref 137–146)

## 2021-04-13 VITALS — DIASTOLIC BLOOD PRESSURE: 67 MMHG | SYSTOLIC BLOOD PRESSURE: 150 MMHG

## 2021-04-13 VITALS — DIASTOLIC BLOOD PRESSURE: 59 MMHG | SYSTOLIC BLOOD PRESSURE: 108 MMHG

## 2021-04-13 VITALS — DIASTOLIC BLOOD PRESSURE: 71 MMHG | SYSTOLIC BLOOD PRESSURE: 154 MMHG

## 2021-04-13 VITALS — SYSTOLIC BLOOD PRESSURE: 149 MMHG | DIASTOLIC BLOOD PRESSURE: 68 MMHG

## 2021-04-13 VITALS — SYSTOLIC BLOOD PRESSURE: 145 MMHG | DIASTOLIC BLOOD PRESSURE: 65 MMHG

## 2021-04-13 VITALS — SYSTOLIC BLOOD PRESSURE: 140 MMHG | DIASTOLIC BLOOD PRESSURE: 61 MMHG

## 2021-04-13 VITALS — DIASTOLIC BLOOD PRESSURE: 61 MMHG | SYSTOLIC BLOOD PRESSURE: 127 MMHG

## 2021-04-13 LAB
ANION GAP SERPL CALCULATED.3IONS-SCNC: 9 MMOL/L
BUN SERPL-MCNC: 70 MG/DL (ref 8–23)
BUN/CREAT SERPL: 42
CHLORIDE SERPL-SCNC: 96 MMOL/L (ref 95–108)
CO2 SERPL-SCNC: 29 MMOL/L (ref 22–30)
CREAT SERPL-MCNC: 1.7 MG/DL (ref 0.7–1.3)
ERYTHROCYTE [DISTWIDTH] IN BLOOD BY AUTOMATED COUNT: 17.8 % (ref 11.5–15.5)
HCT VFR BLD AUTO: 28.3 % (ref 39–50)
HGB BLD-MCNC: 8.4 G/DL (ref 14–18)
MCH RBC QN AUTO: 25.9 PG  CALC (ref 26–32)
MCHC RBC AUTO-ENTMCNC: 29.7 G/DL CAL (ref 32–36)
MCV RBC AUTO: 87.3 FL  CALC (ref 80–100)
PLATELET # BLD AUTO: 140 THOU/UL (ref 130–400)
POTASSIUM SERPL-SCNC: 5 MMOL/L (ref 3.5–5.1)
RBC # BLD AUTO: 3.24 MILL/UL (ref 4.7–6.1)
SODIUM SERPL-SCNC: 130 MMOL/L (ref 137–146)

## 2021-04-14 VITALS — SYSTOLIC BLOOD PRESSURE: 137 MMHG | DIASTOLIC BLOOD PRESSURE: 67 MMHG

## 2021-04-14 VITALS — SYSTOLIC BLOOD PRESSURE: 143 MMHG | DIASTOLIC BLOOD PRESSURE: 68 MMHG

## 2021-04-14 VITALS — SYSTOLIC BLOOD PRESSURE: 145 MMHG | DIASTOLIC BLOOD PRESSURE: 62 MMHG

## 2021-04-14 VITALS — DIASTOLIC BLOOD PRESSURE: 71 MMHG | SYSTOLIC BLOOD PRESSURE: 151 MMHG

## 2021-04-14 VITALS — SYSTOLIC BLOOD PRESSURE: 137 MMHG | DIASTOLIC BLOOD PRESSURE: 60 MMHG

## 2021-04-14 VITALS — SYSTOLIC BLOOD PRESSURE: 134 MMHG | DIASTOLIC BLOOD PRESSURE: 61 MMHG

## 2021-04-14 LAB
ANION GAP SERPL CALCULATED.3IONS-SCNC: 11 MMOL/L
BUN SERPL-MCNC: 70 MG/DL (ref 8–23)
BUN/CREAT SERPL: 41
CHLORIDE SERPL-SCNC: 96 MMOL/L (ref 95–108)
CO2 SERPL-SCNC: 30 MMOL/L (ref 22–30)
CREAT SERPL-MCNC: 1.7 MG/DL (ref 0.7–1.3)
ERYTHROCYTE [DISTWIDTH] IN BLOOD BY AUTOMATED COUNT: 17.5 % (ref 11.5–15.5)
HCT VFR BLD AUTO: 28.4 % (ref 39–50)
HGB BLD-MCNC: 8.5 G/DL (ref 14–18)
MAGNESIUM SERPL-MCNC: 2.4 MG/DL (ref 1.6–2.3)
MCH RBC QN AUTO: 26.2 PG  CALC (ref 26–32)
MCHC RBC AUTO-ENTMCNC: 29.9 G/DL CAL (ref 32–36)
MCV RBC AUTO: 87.7 FL  CALC (ref 80–100)
PLATELET # BLD AUTO: 122 THOU/UL (ref 130–400)
POTASSIUM SERPL-SCNC: 5.1 MMOL/L (ref 3.5–5.1)
RBC # BLD AUTO: 3.24 MILL/UL (ref 4.7–6.1)
SODIUM SERPL-SCNC: 131 MMOL/L (ref 137–146)

## 2021-04-15 VITALS — SYSTOLIC BLOOD PRESSURE: 150 MMHG | DIASTOLIC BLOOD PRESSURE: 60 MMHG

## 2021-04-15 VITALS — SYSTOLIC BLOOD PRESSURE: 126 MMHG | DIASTOLIC BLOOD PRESSURE: 63 MMHG

## 2021-04-15 VITALS — DIASTOLIC BLOOD PRESSURE: 74 MMHG | SYSTOLIC BLOOD PRESSURE: 122 MMHG

## 2021-04-15 VITALS — DIASTOLIC BLOOD PRESSURE: 56 MMHG | SYSTOLIC BLOOD PRESSURE: 143 MMHG

## 2021-04-15 VITALS — SYSTOLIC BLOOD PRESSURE: 173 MMHG | DIASTOLIC BLOOD PRESSURE: 85 MMHG

## 2021-04-15 VITALS — SYSTOLIC BLOOD PRESSURE: 127 MMHG | DIASTOLIC BLOOD PRESSURE: 58 MMHG

## 2021-04-15 LAB
ANION GAP SERPL CALCULATED.3IONS-SCNC: 14 MMOL/L
BUN SERPL-MCNC: 77 MG/DL (ref 8–23)
BUN/CREAT SERPL: 46
CHLORIDE SERPL-SCNC: 97 MMOL/L (ref 95–108)
CO2 SERPL-SCNC: 28 MMOL/L (ref 22–30)
CREAT SERPL-MCNC: 1.7 MG/DL (ref 0.7–1.3)
ERYTHROCYTE [DISTWIDTH] IN BLOOD BY AUTOMATED COUNT: 17.5 % (ref 11.5–15.5)
HCT VFR BLD AUTO: 28.4 % (ref 39–50)
HGB BLD-MCNC: 8.4 G/DL (ref 14–18)
MCH RBC QN AUTO: 25.9 PG  CALC (ref 26–32)
MCHC RBC AUTO-ENTMCNC: 29.6 G/DL CAL (ref 32–36)
MCV RBC AUTO: 87.7 FL  CALC (ref 80–100)
PLATELET # BLD AUTO: 115 THOU/UL (ref 130–400)
POTASSIUM SERPL-SCNC: 5.5 MMOL/L (ref 3.5–5.1)
RBC # BLD AUTO: 3.24 MILL/UL (ref 4.7–6.1)
SODIUM SERPL-SCNC: 133 MMOL/L (ref 137–146)

## 2021-04-16 VITALS — SYSTOLIC BLOOD PRESSURE: 182 MMHG | DIASTOLIC BLOOD PRESSURE: 89 MMHG

## 2021-04-16 VITALS — SYSTOLIC BLOOD PRESSURE: 145 MMHG | DIASTOLIC BLOOD PRESSURE: 69 MMHG

## 2021-04-16 VITALS — DIASTOLIC BLOOD PRESSURE: 76 MMHG | SYSTOLIC BLOOD PRESSURE: 165 MMHG

## 2021-04-16 VITALS — DIASTOLIC BLOOD PRESSURE: 69 MMHG | SYSTOLIC BLOOD PRESSURE: 152 MMHG

## 2021-04-16 VITALS — DIASTOLIC BLOOD PRESSURE: 44 MMHG | SYSTOLIC BLOOD PRESSURE: 106 MMHG

## 2021-04-16 VITALS — SYSTOLIC BLOOD PRESSURE: 123 MMHG | DIASTOLIC BLOOD PRESSURE: 57 MMHG

## 2021-04-16 LAB
ANION GAP SERPL CALCULATED.3IONS-SCNC: 12 MMOL/L
BUN SERPL-MCNC: 69 MG/DL (ref 8–23)
BUN/CREAT SERPL: 42
CHLORIDE SERPL-SCNC: 97 MMOL/L (ref 95–108)
CO2 SERPL-SCNC: 32 MMOL/L (ref 22–30)
CREAT SERPL-MCNC: 1.7 MG/DL (ref 0.7–1.3)
ERYTHROCYTE [DISTWIDTH] IN BLOOD BY AUTOMATED COUNT: 17.2 % (ref 11.5–15.5)
HCT VFR BLD AUTO: 29 % (ref 39–50)
HGB BLD-MCNC: 8.8 G/DL (ref 14–18)
MAGNESIUM SERPL-MCNC: 2.5 MG/DL (ref 1.6–2.3)
MCH RBC QN AUTO: 26.6 PG  CALC (ref 26–32)
MCHC RBC AUTO-ENTMCNC: 30.3 G/DL CAL (ref 32–36)
MCV RBC AUTO: 87.6 FL  CALC (ref 80–100)
PLATELET # BLD AUTO: 151 THOU/UL (ref 130–400)
POTASSIUM SERPL-SCNC: 4.7 MMOL/L (ref 3.5–5.1)
RBC # BLD AUTO: 3.31 MILL/UL (ref 4.7–6.1)
SODIUM SERPL-SCNC: 136 MMOL/L (ref 137–146)

## 2021-04-17 VITALS — SYSTOLIC BLOOD PRESSURE: 107 MMHG | DIASTOLIC BLOOD PRESSURE: 52 MMHG

## 2021-04-17 VITALS — DIASTOLIC BLOOD PRESSURE: 66 MMHG | SYSTOLIC BLOOD PRESSURE: 139 MMHG

## 2021-04-17 VITALS — SYSTOLIC BLOOD PRESSURE: 139 MMHG | DIASTOLIC BLOOD PRESSURE: 57 MMHG

## 2021-04-17 VITALS — DIASTOLIC BLOOD PRESSURE: 82 MMHG | SYSTOLIC BLOOD PRESSURE: 150 MMHG

## 2021-04-17 VITALS — SYSTOLIC BLOOD PRESSURE: 131 MMHG | DIASTOLIC BLOOD PRESSURE: 73 MMHG

## 2021-04-17 VITALS — DIASTOLIC BLOOD PRESSURE: 68 MMHG | SYSTOLIC BLOOD PRESSURE: 141 MMHG

## 2021-04-17 VITALS — DIASTOLIC BLOOD PRESSURE: 59 MMHG | SYSTOLIC BLOOD PRESSURE: 132 MMHG

## 2021-04-17 LAB
ANION GAP SERPL CALCULATED.3IONS-SCNC: 11 MMOL/L
BUN SERPL-MCNC: 70 MG/DL (ref 8–23)
BUN/CREAT SERPL: 47
CHLORIDE SERPL-SCNC: 99 MMOL/L (ref 95–108)
CO2 SERPL-SCNC: 30 MMOL/L (ref 22–30)
CREAT SERPL-MCNC: 1.5 MG/DL (ref 0.7–1.3)
ERYTHROCYTE [DISTWIDTH] IN BLOOD BY AUTOMATED COUNT: 17.2 % (ref 11.5–15.5)
HCT VFR BLD AUTO: 28.8 % (ref 39–50)
HGB BLD-MCNC: 8.4 G/DL (ref 14–18)
MCH RBC QN AUTO: 26 PG  CALC (ref 26–32)
MCHC RBC AUTO-ENTMCNC: 29.2 G/DL CAL (ref 32–36)
MCV RBC AUTO: 89.2 FL  CALC (ref 80–100)
PLATELET # BLD AUTO: 144 THOU/UL (ref 130–400)
POTASSIUM SERPL-SCNC: 4.7 MMOL/L (ref 3.5–5.1)
RBC # BLD AUTO: 3.23 MILL/UL (ref 4.7–6.1)
SODIUM SERPL-SCNC: 136 MMOL/L (ref 137–146)

## 2021-04-18 VITALS — SYSTOLIC BLOOD PRESSURE: 116 MMHG | DIASTOLIC BLOOD PRESSURE: 53 MMHG

## 2021-04-18 VITALS — SYSTOLIC BLOOD PRESSURE: 138 MMHG | DIASTOLIC BLOOD PRESSURE: 70 MMHG

## 2021-04-18 VITALS — DIASTOLIC BLOOD PRESSURE: 64 MMHG | SYSTOLIC BLOOD PRESSURE: 121 MMHG

## 2021-04-18 VITALS — DIASTOLIC BLOOD PRESSURE: 57 MMHG | SYSTOLIC BLOOD PRESSURE: 126 MMHG

## 2021-04-18 VITALS — DIASTOLIC BLOOD PRESSURE: 67 MMHG | SYSTOLIC BLOOD PRESSURE: 133 MMHG

## 2021-04-18 VITALS — SYSTOLIC BLOOD PRESSURE: 126 MMHG | DIASTOLIC BLOOD PRESSURE: 64 MMHG

## 2021-04-18 LAB
ALBUMIN SERPL-MCNC: 2.9 G/DL (ref 3.2–5)
ALP SERPL-CCNC: 125 U/L (ref 38–126)
ANION GAP SERPL CALCULATED.3IONS-SCNC: 11 MMOL/L
AST SERPL-CCNC: 24 U/L (ref 19–48)
BUN SERPL-MCNC: 65 MG/DL (ref 8–23)
BUN/CREAT SERPL: 38
CHLORIDE SERPL-SCNC: 98 MMOL/L (ref 95–108)
CO2 SERPL-SCNC: 30 MMOL/L (ref 22–30)
CREAT SERPL-MCNC: 1.7 MG/DL (ref 0.7–1.3)
ERYTHROCYTE [DISTWIDTH] IN BLOOD BY AUTOMATED COUNT: 17.2 % (ref 11.5–15.5)
HCT VFR BLD AUTO: 27.3 % (ref 39–50)
HGB BLD-MCNC: 8 G/DL (ref 14–18)
MCH RBC QN AUTO: 26.1 PG  CALC (ref 26–32)
MCHC RBC AUTO-ENTMCNC: 29.3 G/DL CAL (ref 32–36)
MCV RBC AUTO: 88.9 FL  CALC (ref 80–100)
PLATELET # BLD AUTO: 139 THOU/UL (ref 130–400)
POTASSIUM SERPL-SCNC: 4.9 MMOL/L (ref 3.5–5.1)
PROT SERPL-MCNC: 5.7 G/DL (ref 6.3–8.2)
RBC # BLD AUTO: 3.07 MILL/UL (ref 4.7–6.1)
SODIUM SERPL-SCNC: 135 MMOL/L (ref 137–146)

## 2021-04-19 VITALS — DIASTOLIC BLOOD PRESSURE: 68 MMHG | SYSTOLIC BLOOD PRESSURE: 133 MMHG

## 2021-04-19 VITALS — SYSTOLIC BLOOD PRESSURE: 125 MMHG | DIASTOLIC BLOOD PRESSURE: 76 MMHG

## 2021-04-19 VITALS — SYSTOLIC BLOOD PRESSURE: 140 MMHG | DIASTOLIC BLOOD PRESSURE: 72 MMHG

## 2021-04-19 VITALS — DIASTOLIC BLOOD PRESSURE: 60 MMHG | SYSTOLIC BLOOD PRESSURE: 137 MMHG

## 2021-04-19 LAB
ALBUMIN SERPL-MCNC: 2.9 G/DL (ref 3.2–5)
ALP SERPL-CCNC: 134 U/L (ref 38–126)
ANION GAP SERPL CALCULATED.3IONS-SCNC: 11 MMOL/L
AST SERPL-CCNC: 24 U/L (ref 19–48)
BUN SERPL-MCNC: 69 MG/DL (ref 8–23)
BUN/CREAT SERPL: 40
CHLORIDE SERPL-SCNC: 98 MMOL/L (ref 95–108)
CO2 SERPL-SCNC: 30 MMOL/L (ref 22–30)
CREAT SERPL-MCNC: 1.7 MG/DL (ref 0.7–1.3)
ERYTHROCYTE [DISTWIDTH] IN BLOOD BY AUTOMATED COUNT: 17.2 % (ref 11.5–15.5)
HCT VFR BLD AUTO: 28.3 % (ref 39–50)
HGB BLD-MCNC: 8.2 G/DL (ref 14–18)
MCH RBC QN AUTO: 25.6 PG  CALC (ref 26–32)
MCHC RBC AUTO-ENTMCNC: 29 G/DL CAL (ref 32–36)
MCV RBC AUTO: 88.4 FL  CALC (ref 80–100)
PLATELET # BLD AUTO: 152 THOU/UL (ref 130–400)
POTASSIUM SERPL-SCNC: 5.2 MMOL/L (ref 3.5–5.1)
PROT SERPL-MCNC: 5.7 G/DL (ref 6.3–8.2)
RBC # BLD AUTO: 3.2 MILL/UL (ref 4.7–6.1)
SODIUM SERPL-SCNC: 134 MMOL/L (ref 137–146)

## 2021-04-21 ENCOUNTER — HOSPITAL ENCOUNTER (EMERGENCY)
Age: 61
Discharge: ANOTHER HOSPITAL | End: 2021-04-21
Payer: MEDICARE

## 2021-04-21 DIAGNOSIS — Z86.73: ICD-10-CM

## 2021-04-21 DIAGNOSIS — K92.0: Primary | ICD-10-CM

## 2021-04-21 DIAGNOSIS — I50.9: ICD-10-CM

## 2021-04-21 DIAGNOSIS — I48.92: ICD-10-CM

## 2021-04-21 DIAGNOSIS — Z95.5: ICD-10-CM

## 2021-04-21 DIAGNOSIS — N18.9: ICD-10-CM

## 2021-04-21 DIAGNOSIS — D64.9: ICD-10-CM

## 2021-04-21 DIAGNOSIS — N04.9: ICD-10-CM

## 2021-04-21 DIAGNOSIS — E11.22: ICD-10-CM

## 2021-04-21 DIAGNOSIS — Z86.16: ICD-10-CM

## 2021-04-21 DIAGNOSIS — Z79.4: ICD-10-CM

## 2021-04-21 DIAGNOSIS — C85.90: ICD-10-CM

## 2021-04-21 DIAGNOSIS — E66.9: ICD-10-CM

## 2021-04-21 DIAGNOSIS — I48.91: ICD-10-CM

## 2021-04-21 DIAGNOSIS — I13.0: ICD-10-CM

## 2021-04-21 DIAGNOSIS — R10.11: ICD-10-CM

## 2021-04-21 DIAGNOSIS — Z95.2: ICD-10-CM

## 2021-04-21 DIAGNOSIS — Z95.1: ICD-10-CM

## 2021-04-21 DIAGNOSIS — R10.13: ICD-10-CM

## 2021-04-21 LAB
ALBUMIN SERPL-MCNC: 3.3 G/DL (ref 3.2–5)
ALP SERPL-CCNC: 161 U/L (ref 38–126)
AMYLASE SERPL-CCNC: 44 U/L (ref 30–110)
ANION GAP SERPL CALCULATED.3IONS-SCNC: 11 MMOL/L
APTT PPP: 24.1 SECONDS (ref 20–32.5)
AST SERPL-CCNC: 24 U/L (ref 19–48)
BASOPHILS NFR BLD AUTO: 1 % (ref 0–3)
BUN SERPL-MCNC: 75 MG/DL (ref 8–23)
BUN/CREAT SERPL: 35
CHLORIDE SERPL-SCNC: 99 MMOL/L (ref 95–108)
CO2 SERPL-SCNC: 32 MMOL/L (ref 22–30)
CREAT SERPL-MCNC: 2.1 MG/DL (ref 0.7–1.3)
EOSINOPHIL NFR BLD AUTO: 6 % (ref 0–8)
ERYTHROCYTE [DISTWIDTH] IN BLOOD BY AUTOMATED COUNT: 17.2 % (ref 11.5–15.5)
HCT VFR BLD AUTO: 28.6 % (ref 39–50)
HGB BLD-MCNC: 8.5 G/DL (ref 14–18)
IMM GRANULOCYTES NFR BLD: 0.4 % (ref 0–5)
INR PPP: 1.1 RATIO (ref 0.7–1.3)
LIPASE SERPL-CCNC: 35 U/L (ref 23–300)
LYMPHOCYTES NFR BLD: 13 % (ref 15–41)
MCH RBC QN AUTO: 26 PG  CALC (ref 26–32)
MCHC RBC AUTO-ENTMCNC: 29.7 G/DL CAL (ref 32–36)
MCV RBC AUTO: 87.5 FL  CALC (ref 80–100)
MONOCYTES NFR BLD AUTO: 10 % (ref 2–13)
NEUTROPHILS # BLD AUTO: 3.63 THOU/UL (ref 1.82–7.42)
NEUTROPHILS NFR BLD AUTO: 69 % (ref 42–76)
PLATELET # BLD AUTO: 168 THOU/UL (ref 130–400)
POTASSIUM SERPL-SCNC: 5.2 MMOL/L (ref 3.5–5.1)
PROT SERPL-MCNC: 6.2 G/DL (ref 6.3–8.2)
PROTHROMBIN TIME: 11 SECONDS (ref 9–12.5)
RBC # BLD AUTO: 3.27 MILL/UL (ref 4.7–6.1)
SODIUM SERPL-SCNC: 137 MMOL/L (ref 137–146)

## 2021-04-21 PROCEDURE — S0164 INJECTION PANTROPRAZOLE: HCPCS

## 2021-06-18 NOTE — NUR
REPORT RECEIVED FROM NIGHT NURSE. PT RESTING IN BED. NO DISTRESS NOTED. PT ON
INSULIN GTT. PT EDUCATED ON ISOLATION STATUS AND FLUID RESTRICTIONS. PT
VERBALIZED UNDERSTANDING. PT STATES HE HAS TENDERNESS AND RECTAL PAIN WHEN
TRYING TO POOP. NO PAIN MEDS DUE AT THIS TIME. WILL CONTINUE TO MONITOR. Implemented All Universal Safety Interventions:  [place] to call system. Call bell, personal items and telephone within reach. Instruct patient to call for assistance. Room bathroom lighting operational. Non-slip footwear when patient is off stretcher. Physically safe environment: no spills, clutter or unnecessary equipment. Stretcher in lowest position, wheels locked, appropriate side rails in place.

## 2022-08-21 NOTE — NUR
HS ACCU CHECK PERFORMED, BLOOD GLUCOSE 124. WILL HOLD HS INSULIN PER MD
ORDERS. BEDTIME SNACK GIVEN AND FRESH WATER. PM MEDICATIONS GIVEN PER MD
ORDERS. LORATAB GIVEN FOR PAIN AND BENADRYL GIVEN FOR ITCHING PER PATIENT
REQUEST. Ambulance EMS

## 2023-10-01 NOTE — NUR
PT BP LOWER 158/88 AND PAIN DOWN TO 5-10. PT VOIDED 50ML IN URINAL. NO BLADDER
DISTENTION NOTED. WILL CONTINUE TO MONITOR. Lightheadedness